# Patient Record
Sex: FEMALE | Race: WHITE | NOT HISPANIC OR LATINO | Employment: OTHER | URBAN - METROPOLITAN AREA
[De-identification: names, ages, dates, MRNs, and addresses within clinical notes are randomized per-mention and may not be internally consistent; named-entity substitution may affect disease eponyms.]

---

## 2017-08-07 ENCOUNTER — APPOINTMENT (OUTPATIENT)
Dept: WOMENS IMAGING | Facility: HOSPITAL | Age: 67
End: 2017-08-07

## 2017-08-07 PROCEDURE — 77063 BREAST TOMOSYNTHESIS BI: CPT | Performed by: RADIOLOGY

## 2017-08-07 PROCEDURE — G0202 SCR MAMMO BI INCL CAD: HCPCS | Performed by: RADIOLOGY

## 2017-11-27 ENCOUNTER — PREP FOR SURGERY (OUTPATIENT)
Dept: OTHER | Facility: HOSPITAL | Age: 67
End: 2017-11-27

## 2017-11-27 DIAGNOSIS — Z12.11 COLON CANCER SCREENING: Primary | ICD-10-CM

## 2017-12-27 PROBLEM — Z12.11 COLON CANCER SCREENING: Status: ACTIVE | Noted: 2017-12-27

## 2018-02-05 ENCOUNTER — ANESTHESIA (OUTPATIENT)
Dept: GASTROENTEROLOGY | Facility: HOSPITAL | Age: 68
End: 2018-02-05

## 2018-02-05 ENCOUNTER — ANESTHESIA EVENT (OUTPATIENT)
Dept: GASTROENTEROLOGY | Facility: HOSPITAL | Age: 68
End: 2018-02-05

## 2018-02-05 ENCOUNTER — HOSPITAL ENCOUNTER (OUTPATIENT)
Facility: HOSPITAL | Age: 68
Setting detail: HOSPITAL OUTPATIENT SURGERY
Discharge: HOME | End: 2018-02-05
Attending: SURGERY | Admitting: SURGERY

## 2018-02-05 VITALS
TEMPERATURE: 98.3 F | OXYGEN SATURATION: 98 % | WEIGHT: 140.19 LBS | HEART RATE: 72 BPM | BODY MASS INDEX: 23.36 KG/M2 | RESPIRATION RATE: 16 BRPM | DIASTOLIC BLOOD PRESSURE: 71 MMHG | HEIGHT: 65 IN | SYSTOLIC BLOOD PRESSURE: 110 MMHG

## 2018-02-05 PROBLEM — K57.30 DIVERTICULOSIS OF LARGE INTESTINE: Status: ACTIVE | Noted: 2018-02-05

## 2018-02-05 PROCEDURE — G0121 COLON CA SCRN NOT HI RSK IND: HCPCS | Performed by: SURGERY

## 2018-02-05 PROCEDURE — 25010000002 PROPOFOL 10 MG/ML EMULSION: Performed by: ANESTHESIOLOGY

## 2018-02-05 RX ORDER — SODIUM CHLORIDE, SODIUM LACTATE, POTASSIUM CHLORIDE, CALCIUM CHLORIDE 600; 310; 30; 20 MG/100ML; MG/100ML; MG/100ML; MG/100ML
30 INJECTION, SOLUTION INTRAVENOUS CONTINUOUS PRN
Status: DISCONTINUED | OUTPATIENT
Start: 2018-02-05 | End: 2018-02-05 | Stop reason: HOSPADM

## 2018-02-05 RX ORDER — SPIRONOLACTONE 50 MG/1
50 TABLET, FILM COATED ORAL DAILY
COMMUNITY
End: 2021-05-17

## 2018-02-05 RX ORDER — PHENOL 1.4 %
600 AEROSOL, SPRAY (ML) MUCOUS MEMBRANE DAILY
COMMUNITY
End: 2021-12-02

## 2018-02-05 RX ORDER — HYDRALAZINE HYDROCHLORIDE 50 MG/1
50 TABLET, FILM COATED ORAL 2 TIMES DAILY
COMMUNITY
End: 2019-11-13 | Stop reason: SDUPTHER

## 2018-02-05 RX ORDER — PROPOFOL 10 MG/ML
VIAL (ML) INTRAVENOUS AS NEEDED
Status: DISCONTINUED | OUTPATIENT
Start: 2018-02-05 | End: 2018-02-05 | Stop reason: SURG

## 2018-02-05 RX ORDER — LIDOCAINE HYDROCHLORIDE 20 MG/ML
INJECTION, SOLUTION INFILTRATION; PERINEURAL AS NEEDED
Status: DISCONTINUED | OUTPATIENT
Start: 2018-02-05 | End: 2018-02-05 | Stop reason: SURG

## 2018-02-05 RX ORDER — SODIUM CHLORIDE 0.9 % (FLUSH) 0.9 %
1-10 SYRINGE (ML) INJECTION AS NEEDED
Status: DISCONTINUED | OUTPATIENT
Start: 2018-02-05 | End: 2018-02-05 | Stop reason: HOSPADM

## 2018-02-05 RX ORDER — ASPIRIN 81 MG/1
81 TABLET ORAL EVERY OTHER DAY
COMMUNITY
End: 2019-04-23

## 2018-02-05 RX ADMIN — LIDOCAINE HYDROCHLORIDE 100 MG: 20 INJECTION, SOLUTION INFILTRATION; PERINEURAL at 09:25

## 2018-02-05 RX ADMIN — PROPOFOL 200 MG: 10 INJECTION, EMULSION INTRAVENOUS at 09:25

## 2018-02-05 RX ADMIN — SODIUM CHLORIDE, POTASSIUM CHLORIDE, SODIUM LACTATE AND CALCIUM CHLORIDE 30 ML/HR: 600; 310; 30; 20 INJECTION, SOLUTION INTRAVENOUS at 08:43

## 2018-02-05 NOTE — ANESTHESIA POSTPROCEDURE EVALUATION
"Patient: Karolina Hoyt    Procedure Summary     Date Anesthesia Start Anesthesia Stop Room / Location    02/05/18 0916 0940  [facility]       Procedure Diagnosis Surgeon Provider    COLONOSCOPY TO CECUM (N/A ) Colon cancer screening  (Colon cancer screening [Z12.11]) MD Cookie Ceron MD          Anesthesia Type: MAC  Last vitals  BP   113/75 (02/05/18 0951)   Temp   36.8 °C (98.3 °F) (02/05/18 0811)   Pulse   72 (02/05/18 0951)   Resp   16 (02/05/18 0951)     SpO2   99 % (02/05/18 0951)     Post Anesthesia Care and Evaluation    Patient location during evaluation: bedside  Patient participation: complete - patient participated  Level of consciousness: awake and alert  Pain management: adequate  Airway patency: patent  Anesthetic complications: No anesthetic complications  PONV Status: none  Cardiovascular status: acceptable  Respiratory status: acceptable  Hydration status: acceptable    Comments: /75 (BP Location: Left arm, Patient Position: Lying)  Pulse 72  Temp 36.8 °C (98.3 °F) (Oral)   Resp 16  Ht 165.1 cm (65\")  Wt 63.6 kg (140 lb 3 oz)  SpO2 99%  BMI 23.33 kg/m2        "

## 2018-02-05 NOTE — OP NOTE
Colonoscopy Procedure Note  Karolina Hoyt  1950  Date of Procedure: 02/05/18    Pre-operative Diagnosis:    · screening    Post-operative Diagnosis:  · Diverticulosis, mild    Procedure: Colonoscopy      Findings/Treatments:   · Diverticulosis, mild       Recommendations:   · C scope in 10 years vs cologuard  · Diverticulosis pamphlet  · Copy of photographs to be given from today's procedure prior to discharge.    Surgeon: Augustine    Anesthetic: MAC per Cookie Howell MD    Indications:  As above    Scope Withdrawal Time:  6 minutes  16 seconds    Procedure Details     MAC anesthesia was induced.  The 180 Colonoscopy was inserted blindly into the rectum and advanced to the cecum, with relative ease,  without need for pressure, lift, or turning.  Cecum was identified by ileocecal valve and appendiceal orifice and photographed for documentation.      Prep quality was good.  A careful inspection was made as the colonoscope was withdrawn, including a retroflexed view of the rectum; there was no suggestion of presence of angiodysplasias, colitis, or polyps, but she did have several sigmoid  Diverticula.    Retroflexion in the rectum revealed no abnormalities.    Emelyn Bradshaw MD  02/05/18  9:39 AM

## 2018-02-05 NOTE — PLAN OF CARE
Problem: Patient Care Overview (Adult)  Goal: Plan of Care Review  Outcome: Ongoing (interventions implemented as appropriate)   02/05/18 0805   Coping/Psychosocial Response Interventions   Plan Of Care Reviewed With patient     Goal: Adult Individualization and Mutuality  Outcome: Ongoing (interventions implemented as appropriate)    Goal: Discharge Needs Assessment  Outcome: Ongoing (interventions implemented as appropriate)   02/05/18 0805   Discharge Needs Assessment   Concerns To Be Addressed no discharge needs identified   Discharge Disposition home or self-care   Living Environment   Transportation Available car;family or friend will provide       Problem: GI Endoscopy (Adult)  Goal: Signs and Symptoms of Listed Potential Problems Will be Absent or Manageable (GI Endoscopy)  Outcome: Ongoing (interventions implemented as appropriate)   02/05/18 0805   GI Endoscopy   Problems Assessed (GI Endoscopy) pain;bleeding   Problems Present (GI Endoscopy) none

## 2018-02-05 NOTE — H&P
Cc: Endoscopy Visit    HPI: 67 y.o. female here for screening with average risk.  She had a single hyperplastic polyp in 2004.    Past Medical History:   Diagnosis Date   • Arthritis    • Bronchitis    • Hypertension        Past Surgical History:   Procedure Laterality Date   • HYSTERECTOMY     • THYROID LOBECTOMY      approx 1999 or 2000       is allergic to erythromycin.       Medication List      ASK your doctor about these medications          aspirin 81 MG EC tablet       calcium carbonate 600 MG tablet   Commonly known as:  OS-ZAIDA       hydrALAZINE 50 MG tablet   Commonly known as:  APRESOLINE       spironolactone 50 MG tablet   Commonly known as:  ALDACTONE           History reviewed. No pertinent family history.    Social History     Social History   • Marital status:      Spouse name: N/A   • Number of children: N/A   • Years of education: N/A     Occupational History   • Not on file.     Social History Main Topics   • Smoking status: Former Smoker   • Smokeless tobacco: Never Used      Comment: quit approx 2009   • Alcohol use Yes      Comment: rarely   • Drug use: Not on file   • Sexual activity: Not on file     Other Topics Concern   • Not on file     Social History Narrative   • No narrative on file       Vitals:    02/05/18 0811   BP: 131/84   Pulse: 86   Resp: 20   Temp: 98.3 °F (36.8 °C)   SpO2: 98%       Body mass index is 23.33 kg/(m^2).    Physical Exam    General: No acute distress  Lungs: No labored breathing, Pulse oximetry on room air is 98%.  Heart: RRR  Abdo: Soft  Mental:  Awake, alert, and oriented    Imp:     · screening     Plan:  · c salima Bradshaw MD  9:21 AM

## 2018-02-05 NOTE — ANESTHESIA PREPROCEDURE EVALUATION
Anesthesia Evaluation     Patient summary reviewed and Nursing notes reviewed   NPO Solid Status: > 8 hours  NPO Liquid Status: > 2 hours     Airway   Mallampati: I  TM distance: >3 FB  Neck ROM: full  no difficulty expected  Dental - normal exam     Pulmonary - normal exam   (+) a smoker Former,   Cardiovascular - normal exam    (+) hypertension (one med),       Neuro/Psych  GI/Hepatic/Renal/Endo      Musculoskeletal     Abdominal  - normal exam    Bowel sounds: normal.   Substance History      OB/GYN          Other                                              Anesthesia Plan    ASA 2     MAC     Anesthetic plan and risks discussed with patient.

## 2018-08-13 ENCOUNTER — APPOINTMENT (OUTPATIENT)
Dept: WOMENS IMAGING | Facility: HOSPITAL | Age: 68
End: 2018-08-13

## 2018-08-13 PROCEDURE — 77063 BREAST TOMOSYNTHESIS BI: CPT | Performed by: RADIOLOGY

## 2018-08-13 PROCEDURE — 77067 SCR MAMMO BI INCL CAD: CPT | Performed by: RADIOLOGY

## 2019-03-20 DIAGNOSIS — E03.9 ACQUIRED HYPOTHYROIDISM: Primary | ICD-10-CM

## 2019-04-16 DIAGNOSIS — I10 HYPERTENSION, UNSPECIFIED TYPE: Primary | ICD-10-CM

## 2019-04-16 DIAGNOSIS — E03.9 ACQUIRED HYPOTHYROIDISM: ICD-10-CM

## 2019-04-17 LAB
ALBUMIN SERPL-MCNC: 4.8 G/DL (ref 3.5–5.2)
ALBUMIN/GLOB SERPL: 2.1 G/DL
ALP SERPL-CCNC: 66 U/L (ref 39–117)
ALT SERPL-CCNC: 13 U/L (ref 1–33)
AST SERPL-CCNC: 20 U/L (ref 1–32)
BILIRUB SERPL-MCNC: 1.8 MG/DL (ref 0.2–1.2)
BUN SERPL-MCNC: 20 MG/DL (ref 8–23)
BUN/CREAT SERPL: 23.5 (ref 7–25)
CALCIUM SERPL-MCNC: 10.5 MG/DL (ref 8.6–10.5)
CHLORIDE SERPL-SCNC: 99 MMOL/L (ref 98–107)
CHOLEST SERPL-MCNC: 216 MG/DL (ref 0–200)
CO2 SERPL-SCNC: 28.5 MMOL/L (ref 22–29)
CREAT SERPL-MCNC: 0.85 MG/DL (ref 0.57–1)
GLOBULIN SER CALC-MCNC: 2.3 GM/DL
GLUCOSE SERPL-MCNC: 87 MG/DL (ref 65–99)
HDLC SERPL-MCNC: 100 MG/DL (ref 40–60)
LDLC SERPL CALC-MCNC: 106 MG/DL (ref 0–100)
POTASSIUM SERPL-SCNC: 3.8 MMOL/L (ref 3.5–5.2)
PROT SERPL-MCNC: 7.1 G/DL (ref 6–8.5)
SODIUM SERPL-SCNC: 140 MMOL/L (ref 136–145)
TRIGL SERPL-MCNC: 48 MG/DL (ref 0–150)
TSH SERPL DL<=0.005 MIU/L-ACNC: 3.17 MIU/ML (ref 0.27–4.2)
VLDLC SERPL CALC-MCNC: 9.6 MG/DL

## 2019-04-23 ENCOUNTER — OFFICE VISIT (OUTPATIENT)
Dept: INTERNAL MEDICINE | Facility: CLINIC | Age: 69
End: 2019-04-23

## 2019-04-23 VITALS
BODY MASS INDEX: 21.99 KG/M2 | SYSTOLIC BLOOD PRESSURE: 130 MMHG | DIASTOLIC BLOOD PRESSURE: 64 MMHG | HEART RATE: 74 BPM | WEIGHT: 132 LBS | HEIGHT: 65 IN

## 2019-04-23 DIAGNOSIS — I10 ESSENTIAL HYPERTENSION: Primary | ICD-10-CM

## 2019-04-23 DIAGNOSIS — Z00.00 MEDICARE ANNUAL WELLNESS VISIT, SUBSEQUENT: ICD-10-CM

## 2019-04-23 DIAGNOSIS — F41.9 ANXIETY: ICD-10-CM

## 2019-04-23 PROBLEM — Z12.11 COLON CANCER SCREENING: Status: RESOLVED | Noted: 2017-12-27 | Resolved: 2019-04-23

## 2019-04-23 PROCEDURE — G0439 PPPS, SUBSEQ VISIT: HCPCS | Performed by: INTERNAL MEDICINE

## 2019-04-23 RX ORDER — CALCIUM POLYCARBOPHIL 625 MG 625 MG/1
625 TABLET ORAL DAILY
Qty: 90 TABLET | Refills: 0 | COMMUNITY
Start: 2019-04-23 | End: 2021-05-17

## 2019-04-23 RX ORDER — BUSPIRONE HYDROCHLORIDE 5 MG/1
5 TABLET ORAL DAILY
Qty: 90 TABLET | Refills: 3 | Status: SHIPPED | OUTPATIENT
Start: 2019-04-23 | End: 2019-06-19 | Stop reason: SDUPTHER

## 2019-04-23 NOTE — PROGRESS NOTES
QUICK REFERENCE INFORMATION:  The ABCs of the Annual Wellness Visit    Subsequent Medicare Wellness Visit    HEALTH RISK ASSESSMENT    1950    Recent Hospitalizations:  No hospitalization(s) within the last year..    Current Medical Providers:  Patient Care Team:  Josie Youssef MD as PCP - General (Internal Medicine)  Emelyn Bradshaw MD as Surgeon (General Surgery)    Smoking Status:  Social History     Tobacco Use   Smoking Status Former Smoker   Smokeless Tobacco Never Used   Tobacco Comment    quit approx 2009       Alcohol Consumption:  Social History     Substance and Sexual Activity   Alcohol Use Yes    Comment: rarely       Depression Screen:   PHQ-2/PHQ-9 Depression Screening 4/23/2019   Little interest or pleasure in doing things 0   Feeling down, depressed, or hopeless 0   Trouble falling or staying asleep, or sleeping too much 0   Feeling tired or having little energy 0   Poor appetite or overeating 0   Feeling bad about yourself - or that you are a failure or have let yourself or your family down 0   Trouble concentrating on things, such as reading the newspaper or watching television 0   Moving or speaking so slowly that other people could have noticed. Or the opposite - being so fidgety or restless that you have been moving around a lot more than usual 0   Thoughts that you would be better off dead, or of hurting yourself in some way 0   Total Score 0       Health Habits and Functional and Cognitive Screening:  Functional & Cognitive Status 4/23/2019   Do you have difficulty preparing food and eating? No   Do you have difficulty bathing yourself, getting dressed or grooming yourself? No   Do you have difficulty using the toilet? No   Do you have difficulty moving around from place to place? No   Do you have trouble with steps or getting out of a bed or a chair? No   In the past year have you fallen or experienced a near fall? No   Current Diet Well Balanced Diet   Dental Exam Up to date   Eye  Exam Up to date   Exercise (times per week) 4 times per week   Current Exercise Activities Include Walking   Do you need help using the phone?  No   Are you deaf or do you have serious difficulty hearing?  No   Do you need help with transportation? No   Do you need help shopping? No   Do you need help preparing meals?  No   Do you need help with housework?  No   Do you need help with laundry? No   Do you need help taking your medications? No   Do you need help managing money? No   Do you ever drive or ride in a car without wearing a seat belt? No   Have you felt unusual stress, anger or loneliness in the last month? Yes   Who do you live with? Spouse   If you need help, do you have trouble finding someone available to you? No   Have you been bothered in the last four weeks by sexual problems? No   Do you have difficulty concentrating, remembering or making decisions? No     Activities of Daily Living  Do you have difficulty preparing food and eating?: No  Do you have difficulty bathing yourself, getting dressed or grooming yourself?: No  Do you have difficulty using the toilet?: No  Do you have difficulty moving around from place to place?: No  Do you have trouble with steps or getting out of a bed or a chair?: No  In the past year have you fallen or experienced a near fall?: No  Instrumental Activities of Daily Living  Do you need help using the phone? : No  Are you deaf or do you have serious difficulty hearing? : No  Do you need help with transportation?: No  Do you need help shopping?: No  Do you need help preparing meals? : No  Do you need help with housework? : No  Do you need help with laundry?: No  Do you need help taking your medications?: No  Do you need help managing money?: No  Do you ever drive or ride in a car without wearing a seat belt?: No  Psychosocial Risks  Have you felt unusual stress, anger or loneliness in the last month?: (!) Yes  Who do you live with?: Spouse  If you need help, do you have  trouble finding someone available to you?: No  Have you been bothered in the last four weeks by sexual problems?: No  Cognitive Status  Do you have difficulty concentrating, remembering or making decisions?: No  Health Habits  Current Diet: Well Balanced Diet  Dental Exam: Up to date  Eye Exam: Up to date  Exercise (times per week): 4 times per week  Current Exercise Activities Include: Walking    Does the patient have evidence of cognitive impairment? No     Aspirin use counseling: Does not need ASA but is currently taking (advised patient that ASA is not indicated and patient chooses to stop it)    Recent Lab Results:  CMP:  Lab Results   Component Value Date    GLU 87 04/16/2019    BUN 20 04/16/2019    CREATININE 0.85 04/16/2019    EGFRIFNONA 66 04/16/2019    EGFRIFAFRI 80 04/16/2019    BCR 23.5 04/16/2019     04/16/2019    K 3.8 04/16/2019    CO2 28.5 04/16/2019    CALCIUM 10.5 04/16/2019    PROTENTOTREF 7.1 04/16/2019    ALBUMIN 4.80 04/16/2019    LABGLOBREF 2.3 04/16/2019    LABIL2 2.1 04/16/2019    BILITOT 1.8 (H) 04/16/2019    ALKPHOS 66 04/16/2019    AST 20 04/16/2019    ALT 13 04/16/2019     Lipid Panel:  Lab Results   Component Value Date    TRIG 48 04/16/2019     (H) 04/16/2019    VLDL 9.6 04/16/2019     HbA1c:       Visual Acuity:  No exam data present    Age-appropriate Screening Schedule:  Refer to the list below for future screening recommendations based on patient's age, sex and/or medical conditions. Orders for these recommended tests are listed in the plan section. The patient has been provided with a written plan.    Health Maintenance   Topic Date Due   • TDAP/TD VACCINES (1 - Tdap) 03/29/1969   • ZOSTER VACCINE (1 of 2) 03/29/2000   • MAMMOGRAM  11/27/2017   • INFLUENZA VACCINE  08/01/2019   • COLONOSCOPY  02/05/2028   • PNEUMOCOCCAL VACCINES (65+ LOW/MEDIUM RISK)  Completed        Subjective   History of Present Illness    Karolina Hoyt is a 69 y.o. female who presents for a  Subsequent Wellness Visit.  HPI    The following portions of the patient's history were reviewed and updated as appropriate: allergies, current medications, past family history, past medical history, past social history, past surgical history and problem list.    Outpatient Medications Prior to Visit   Medication Sig Dispense Refill   • busPIRone (BUSPAR) 5 MG tablet      • calcium carbonate (OS-ZAIDA) 600 MG tablet Take 600 mg by mouth Daily.     • hydrALAZINE (APRESOLINE) 50 MG tablet Take 50 mg by mouth 2 (Two) Times a Day.     • losartan (COZAAR) 100 MG tablet      • spironolactone (ALDACTONE) 50 MG tablet Take 50 mg by mouth Daily.     • aspirin 81 MG EC tablet Take 81 mg by mouth Every Other Day.     • Calcium Carbonate-Vit D-Min (CALCIUM 1200) 1412-8342 MG-UNIT chewable tablet Chew.       No facility-administered medications prior to visit.        Patient Active Problem List   Diagnosis   • Diverticulosis of large intestine   • Essential hypertension   • Anxiety       Advance Care Planning:  Patient has an advance directive - a copy has not been provided. Have asked the patient to send this to us to add to record    Identification of Risk Factors:  Risk factors include: none    Review of Systems   Constitutional: Negative.    HENT: Negative.    Respiratory: Negative.    Gastrointestinal: Negative.    Endocrine: Negative.    Genitourinary: Negative.    Neurological: Negative.    Psychiatric/Behavioral: Negative.        Compared to one year ago, the patient feels her physical health is better and her mental health is better.    Objective     Physical Exam   Cardiovascular: Normal rate, regular rhythm and normal heart sounds.   Pulmonary/Chest: Effort normal and breath sounds normal.   Musculoskeletal: She exhibits no edema.   Lymphadenopathy:     She has no cervical adenopathy.   Psychiatric: She has a normal mood and affect. Her behavior is normal. Judgment and thought content normal.       Vitals:    04/23/19  "1406 04/23/19 1444   BP:  130/64   Pulse:  74   Weight: 59.9 kg (132 lb)    Height: 165.1 cm (65\")    PainSc: 0-No pain 0-No pain       Patient's Body mass index is 21.97 kg/m². BMI is within normal parameters. No follow-up required..        Assessment/Plan   Patient Self-Management and Personalized Health Advice  The patient has been provided with information about: Immunizations given today; none  none and preventive services including:     · Exercise counseling provided.    Visit Diagnoses:  Problem List Items Addressed This Visit        Cardiovascular and Mediastinum    Essential hypertension - Primary       Other    Anxiety      Other Visit Diagnoses     Medicare annual wellness visit, subsequent        up to date          No orders of the defined types were placed in this encounter.      Outpatient Encounter Medications as of 4/23/2019   Medication Sig Dispense Refill   • busPIRone (BUSPAR) 5 MG tablet      • calcium carbonate (OS-ZAIDA) 600 MG tablet Take 600 mg by mouth Daily.     • hydrALAZINE (APRESOLINE) 50 MG tablet Take 50 mg by mouth 2 (Two) Times a Day.     • losartan (COZAAR) 100 MG tablet      • spironolactone (ALDACTONE) 50 MG tablet Take 50 mg by mouth Daily.     • [DISCONTINUED] aspirin 81 MG EC tablet Take 81 mg by mouth Every Other Day.     • calcium polycarbophil (FIBERCON) 625 MG tablet Take 1 tablet by mouth Daily. 90 tablet 0   • [DISCONTINUED] Calcium Carbonate-Vit D-Min (CALCIUM 1200) 7116-6208 MG-UNIT chewable tablet Chew.       No facility-administered encounter medications on file as of 4/23/2019.        Reviewed use of high risk medication in the elderly: yes  Reviewed for potential of harmful drug interactions in the elderly: no    Follow Up:  Return in about 6 months (around 10/23/2019).     An After Visit Summary and PPPS with all of these plans were given to the patient.         "

## 2019-04-25 ENCOUNTER — TELEPHONE (OUTPATIENT)
Dept: INTERNAL MEDICINE | Facility: CLINIC | Age: 69
End: 2019-04-25

## 2019-06-19 ENCOUNTER — OFFICE VISIT (OUTPATIENT)
Dept: INTERNAL MEDICINE | Facility: CLINIC | Age: 69
End: 2019-06-19

## 2019-06-19 VITALS
WEIGHT: 128.2 LBS | BODY MASS INDEX: 20.6 KG/M2 | HEIGHT: 66 IN | DIASTOLIC BLOOD PRESSURE: 78 MMHG | SYSTOLIC BLOOD PRESSURE: 132 MMHG

## 2019-06-19 DIAGNOSIS — F41.9 ANXIETY: Primary | ICD-10-CM

## 2019-06-19 DIAGNOSIS — I10 ESSENTIAL HYPERTENSION: ICD-10-CM

## 2019-06-19 PROCEDURE — 99213 OFFICE O/P EST LOW 20 MIN: CPT | Performed by: PHYSICIAN ASSISTANT

## 2019-06-19 RX ORDER — BUSPIRONE HYDROCHLORIDE 5 MG/1
10 TABLET ORAL 2 TIMES DAILY
Qty: 180 TABLET | Refills: 1 | Status: SHIPPED | OUTPATIENT
Start: 2019-06-19 | End: 2019-07-10 | Stop reason: SDUPTHER

## 2019-06-19 NOTE — PROGRESS NOTES
Subjective   Chief Complaint   Patient presents with   • Hypertension   • Depression       Pt is a 69 year old white female who presents today for elevated BP and worsening anxiety. She states her BP was running normal until about a week ago has been on the higher side in the 140's/90's. She has had an occasional headache as well which is not usual for her. She took care of her brother who stayed with her for 6 weeks and went back to his daughter's 3 -4 mos ago. Her anxiety has been worse since this, she is frustrated because she was feeling so good. She started taking a buspar 5 mg in the evening in addition to her morning dose, but has not noticed she is feeling much better. She states her appetite is down as well. She is hungry but just does not feel like eating much, she has had some depression as well.           Patient Active Problem List   Diagnosis   • Diverticulosis of large intestine   • Essential hypertension   • Anxiety       Allergies   Allergen Reactions   • Erythromycin Nausea Only       Current Outpatient Medications on File Prior to Visit   Medication Sig Dispense Refill   • calcium carbonate (OS-ZAIAD) 600 MG tablet Take 600 mg by mouth Daily.     • calcium polycarbophil (FIBERCON) 625 MG tablet Take 1 tablet by mouth Daily. 90 tablet 0   • hydrALAZINE (APRESOLINE) 50 MG tablet Take 50 mg by mouth 2 (Two) Times a Day.     • losartan (COZAAR) 100 MG tablet      • spironolactone (ALDACTONE) 50 MG tablet Take 50 mg by mouth Daily.       No current facility-administered medications on file prior to visit.        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    • Tachycardia    • Urine frequency        Family History   Problem Relation Age of Onset   • Dementia Mother    • Breast cancer Mother    • Tuberculosis Father    • Breast cancer Sister    • Prostate cancer Brother         Social History     Socioeconomic History   • Marital status:      Spouse name: Not on file   • Number of children: Not on file   • Years of education: Not on file   • Highest education level: Not on file   Tobacco Use   • Smoking status: Former Smoker   • Smokeless tobacco: Never Used   • Tobacco comment: quit approx 2009   Substance and Sexual Activity   • Alcohol use: Yes     Comment: rarely   • Drug use: No       Past Surgical History:   Procedure Laterality Date   • COLONOSCOPY N/A 2/5/2018    Procedure: COLONOSCOPY TO CECUM;  Surgeon: Emelyn Bradshaw MD;  Location: [facility] END[facility]COPY;  Service:    • THYROID LOBECTOMY      approx 1999 or 2000   • THYROIDECTOMY, PARTIAL         PHQ-9 Depression Screening  Little interest or pleasure in doing things?     Feeling down, depressed, or hopeless?     Trouble falling or staying asleep, or sleeping too much?     Feeling tired or having little energy?     Poor appetite or overeating?     Feeling bad about yourself - or that you are a failure or have let yourself or your family down?     Trouble concentrating on things, such as reading the newspaper or watching television?     Moving or speaking so slowly that other people could have noticed? Or the opposite - being so fidgety or restless that you have been moving around a lot more than usual?     Thoughts that you would be better off dead, or of hurting yourself in some way?     PHQ-9 Total Score     If you checked off any problems, how difficult have these problems made it for you to do your work, take care of things at home, or get along with other people?       The following portions of the patient's history were reviewed and updated as appropriate: problem list, allergies, current medications, past medical history, past family history, past social history and past surgical history.    Review of Systems   Constitution: Positive for decreased appetite.   Gastrointestinal: Negative for bloating and abdominal  "pain.   Psychiatric/Behavioral: Positive for depression. The patient is nervous/anxious.        Immunization History   Administered Date(s) Administered   • DTaP 05/06/2011   • Flu Vaccine Intradermal Quad 18-64YR 11/07/2018   • Pneumococcal Conjugate 13-Valent (PCV13) 09/21/2016   • Pneumococcal Polysaccharide (PPSV23) 11/07/2017       Objective   Vitals:    06/19/19 1442 06/19/19 1517   BP:  132/78   Weight: 58.2 kg (128 lb 3.2 oz)    Height: 166.4 cm (65.5\")      Physical Exam   Constitutional: She appears well-developed and well-nourished.   Cardiovascular: Normal rate, regular rhythm and normal heart sounds.   Pulmonary/Chest: Effort normal and breath sounds normal.   Abdominal: Soft. Bowel sounds are normal.   Psychiatric: She has a normal mood and affect. Her behavior is normal. Thought content normal.   Vitals reviewed.        Assessment/Plan   Karolina was seen today for hypertension and depression.    Diagnoses and all orders for this visit:    Anxiety    Essential hypertension    Other orders  -     busPIRone (BUSPAR) 5 MG tablet; Take 2 tablets by mouth 2 (Two) Times a Day.    I am going to increase her buspar to 10 mg AM and PM. Her BP is ok today, I will have her continue to monitor. I would like her to fup with Dr. Youssef in 3 weeks.              "

## 2019-07-10 ENCOUNTER — OFFICE VISIT (OUTPATIENT)
Dept: INTERNAL MEDICINE | Facility: CLINIC | Age: 69
End: 2019-07-10

## 2019-07-10 VITALS
BODY MASS INDEX: 20.89 KG/M2 | HEIGHT: 66 IN | WEIGHT: 130 LBS | HEART RATE: 74 BPM | SYSTOLIC BLOOD PRESSURE: 118 MMHG | DIASTOLIC BLOOD PRESSURE: 66 MMHG

## 2019-07-10 DIAGNOSIS — I10 ESSENTIAL HYPERTENSION: ICD-10-CM

## 2019-07-10 DIAGNOSIS — F41.9 ANXIETY: Primary | ICD-10-CM

## 2019-07-10 PROCEDURE — 99212 OFFICE O/P EST SF 10 MIN: CPT | Performed by: INTERNAL MEDICINE

## 2019-07-10 RX ORDER — BUSPIRONE HYDROCHLORIDE 10 MG/1
10 TABLET ORAL 2 TIMES DAILY
Qty: 180 TABLET | Refills: 1 | Status: SHIPPED | OUTPATIENT
Start: 2019-07-10 | End: 2020-02-13

## 2019-07-10 NOTE — PROGRESS NOTES
Assessment and Plan  Karolina was seen today for anxiety and hypertension.    Diagnoses and all orders for this visit:    Anxiety  Comments:  stay on busprione - call with problems.    Essential hypertension  Comments:  BP much improved, no changes made.      F/U and Patient Instructions    No Follow-up on file.  There are no Patient Instructions on file for this visit.    Subjective    Karolina Hoyt is a 69 y.o. female being seen in our office today for Anxiety and Hypertension     History of the Present Illness  HPI  Here to f/u addition of extra dose of buspirone.  She feels much better on the higher dose.  Thinks problems got worse when she kept her brother with dementia for a few weeks in April.    BP  Patient History        Significant Past History  The following portions of the patient's history were reviewed and updated as appropriate:PMHroutine: Social history , Allergies, Current Medications, Active Problem List and Health Maintenance              Social History  She  reports that she has quit smoking. She has never used smokeless tobacco. She reports that she drinks alcohol. She reports that she does not use drugs.                         Review of Symptoms  Review of Systems   Constitution: Negative for decreased appetite and weight loss.   Cardiovascular: Negative.    Respiratory: Negative.    Psychiatric/Behavioral: Negative.      Objective  Vital Signs         BP Readings from Last 1 Encounters:   07/10/19 118/66     Wt Readings from Last 3 Encounters:   07/10/19 59 kg (130 lb)   06/19/19 58.2 kg (128 lb 3.2 oz)   04/23/19 59.9 kg (132 lb)   Body mass index is 21.3 kg/m².          Physical Exam   Physical Exam   Constitutional: No distress.   Cardiovascular: Normal rate and regular rhythm.   Pulmonary/Chest: Effort normal.   Psychiatric: She has a normal mood and affect. Her behavior is normal.     Data Reviewed    No results found for this or any previous visit (from the past 2016 hour(s)).

## 2019-08-19 ENCOUNTER — APPOINTMENT (OUTPATIENT)
Dept: WOMENS IMAGING | Facility: HOSPITAL | Age: 69
End: 2019-08-19

## 2019-08-19 PROCEDURE — 77067 SCR MAMMO BI INCL CAD: CPT | Performed by: RADIOLOGY

## 2019-08-19 PROCEDURE — 77063 BREAST TOMOSYNTHESIS BI: CPT | Performed by: RADIOLOGY

## 2019-09-18 ENCOUNTER — TELEPHONE (OUTPATIENT)
Dept: INTERNAL MEDICINE | Facility: CLINIC | Age: 69
End: 2019-09-18

## 2019-09-18 NOTE — TELEPHONE ENCOUNTER
Dr. LENNON pt called said that she is taking buspirone 10mg bid.  She is still having issue with depression and thinks something needs to be adjusted.  Please advise     PT # [id number]

## 2019-09-25 ENCOUNTER — OFFICE VISIT (OUTPATIENT)
Dept: INTERNAL MEDICINE | Facility: CLINIC | Age: 69
End: 2019-09-25

## 2019-09-25 VITALS
HEART RATE: 74 BPM | SYSTOLIC BLOOD PRESSURE: 112 MMHG | DIASTOLIC BLOOD PRESSURE: 68 MMHG | BODY MASS INDEX: 20.09 KG/M2 | HEIGHT: 66 IN | WEIGHT: 125 LBS

## 2019-09-25 DIAGNOSIS — R63.4 WEIGHT LOSS: ICD-10-CM

## 2019-09-25 DIAGNOSIS — I10 ESSENTIAL HYPERTENSION: ICD-10-CM

## 2019-09-25 DIAGNOSIS — R53.83 FATIGUE, UNSPECIFIED TYPE: Primary | ICD-10-CM

## 2019-09-25 PROCEDURE — 99213 OFFICE O/P EST LOW 20 MIN: CPT | Performed by: INTERNAL MEDICINE

## 2019-09-25 NOTE — PROGRESS NOTES
Assessment and Plan  Karolina was seen today for depression.    Diagnoses and all orders for this visit:    Fatigue, unspecified type  Comments:  r/o organic cause before we try to treat with different or escalating antidepressant therapy.  Orders:  -     CBC & Differential  -     Comprehensive Metabolic Panel  -     Ferritin    Weight loss  -     CBC & Differential  -     Comprehensive Metabolic Panel  -     Ferritin    Essential hypertension  Comments:  controlled.      F/U and Patient Instructions    No Follow-up on file.  There are no Patient Instructions on file for this visit.    Subjective    Karolina Hoyt is a 69 y.o. female being seen in our office today for Depression     History of the Present Illness  HPI Having trouble with depression- she feels fatigued, doesn't want to do much, etc.  Doesn't know of any event that may have started all of this.   Physically she notices she is very low energy, she gets SOB. She has not been able to do her regular walking. Doesn't want to and gets SOB doing. It.  Poor appetite- gets diarrhea, loose stools after she eats, if she pushes it too much. Takes Pepto prn weekly.  Doesn't have an appetite for anything in particular.      Patient History        Significant Past History  The following portions of the patient's history were reviewed and updated as appropriate:PMHroutine: Social history , Allergies, Current Medications, Active Problem List and Health Maintenance              Social History  She  reports that she has quit smoking. She has never used smokeless tobacco. She reports that she drinks alcohol. She reports that she does not use drugs.                         Review of Symptoms  Review of Systems   Constitution: Positive for decreased appetite and weight loss.   HENT: Negative.    Cardiovascular: Negative.    Respiratory:        ALFARO     Musculoskeletal: Negative.    Psychiatric/Behavioral: Positive for depression. Negative for memory loss and suicidal  ideas. The patient is nervous/anxious. The patient does not have insomnia.      Objective  Vital Signs         BP Readings from Last 1 Encounters:   09/25/19 112/68     Wt Readings from Last 3 Encounters:   09/25/19 56.7 kg (125 lb)   07/10/19 59 kg (130 lb)   06/19/19 58.2 kg (128 lb 3.2 oz)   Body mass index is 20.48 kg/m².          Physical Exam   Physical Exam   Constitutional: No distress.   Cardiovascular: Normal rate, regular rhythm and normal heart sounds.   Abdominal: Tenderness: epigastric, no guarding or rebound.   Musculoskeletal: She exhibits no edema.     Data Reviewed    No results found for this or any previous visit (from the past 2016 hour(s)).

## 2019-09-26 DIAGNOSIS — F41.9 ANXIETY: Primary | ICD-10-CM

## 2019-09-26 LAB
ALBUMIN SERPL-MCNC: 4.5 G/DL (ref 3.5–5.2)
ALBUMIN/GLOB SERPL: 2 G/DL
ALP SERPL-CCNC: 64 U/L (ref 39–117)
ALT SERPL-CCNC: 9 U/L (ref 1–33)
AST SERPL-CCNC: 13 U/L (ref 1–32)
BASOPHILS # BLD AUTO: 0.04 10*3/MM3 (ref 0–0.2)
BASOPHILS NFR BLD AUTO: 0.5 % (ref 0–1.5)
BILIRUB SERPL-MCNC: 1.3 MG/DL (ref 0.2–1.2)
BUN SERPL-MCNC: 21 MG/DL (ref 8–23)
BUN/CREAT SERPL: 27.3 (ref 7–25)
CALCIUM SERPL-MCNC: 10.1 MG/DL (ref 8.6–10.5)
CHLORIDE SERPL-SCNC: 103 MMOL/L (ref 98–107)
CO2 SERPL-SCNC: 29.5 MMOL/L (ref 22–29)
CREAT SERPL-MCNC: 0.77 MG/DL (ref 0.57–1)
EOSINOPHIL # BLD AUTO: 0.05 10*3/MM3 (ref 0–0.4)
EOSINOPHIL NFR BLD AUTO: 0.6 % (ref 0.3–6.2)
ERYTHROCYTE [DISTWIDTH] IN BLOOD BY AUTOMATED COUNT: 12.4 % (ref 12.3–15.4)
FERRITIN SERPL-MCNC: 111 NG/ML (ref 13–150)
GLOBULIN SER CALC-MCNC: 2.3 GM/DL
GLUCOSE SERPL-MCNC: 90 MG/DL (ref 65–99)
HCT VFR BLD AUTO: 39.4 % (ref 34–46.6)
HGB BLD-MCNC: 12.6 G/DL (ref 12–15.9)
IMM GRANULOCYTES # BLD AUTO: 0.02 10*3/MM3 (ref 0–0.05)
IMM GRANULOCYTES NFR BLD AUTO: 0.2 % (ref 0–0.5)
LYMPHOCYTES # BLD AUTO: 0.99 10*3/MM3 (ref 0.7–3.1)
LYMPHOCYTES NFR BLD AUTO: 12.1 % (ref 19.6–45.3)
MCH RBC QN AUTO: 28.6 PG (ref 26.6–33)
MCHC RBC AUTO-ENTMCNC: 32 G/DL (ref 31.5–35.7)
MCV RBC AUTO: 89.5 FL (ref 79–97)
MONOCYTES # BLD AUTO: 0.58 10*3/MM3 (ref 0.1–0.9)
MONOCYTES NFR BLD AUTO: 7.1 % (ref 5–12)
NEUTROPHILS # BLD AUTO: 6.53 10*3/MM3 (ref 1.7–7)
NEUTROPHILS NFR BLD AUTO: 79.5 % (ref 42.7–76)
NRBC BLD AUTO-RTO: 0 /100 WBC (ref 0–0.2)
PLATELET # BLD AUTO: 355 10*3/MM3 (ref 140–450)
POTASSIUM SERPL-SCNC: 4.4 MMOL/L (ref 3.5–5.2)
PROT SERPL-MCNC: 6.8 G/DL (ref 6–8.5)
RBC # BLD AUTO: 4.4 10*6/MM3 (ref 3.77–5.28)
SODIUM SERPL-SCNC: 142 MMOL/L (ref 136–145)
WBC # BLD AUTO: 8.21 10*3/MM3 (ref 3.4–10.8)

## 2019-10-14 ENCOUNTER — TELEPHONE (OUTPATIENT)
Dept: INTERNAL MEDICINE | Facility: CLINIC | Age: 69
End: 2019-10-14

## 2019-10-14 NOTE — TELEPHONE ENCOUNTER
Dr. LENNON PT called states she has not heard anything from the psych dr you referred her to.  (Dr. Leydi Guillaume)  Pt states she has tried to call herself and all it does is ring with not answer     Please advise    Pt # [phone]

## 2019-10-14 NOTE — TELEPHONE ENCOUNTER
The patient was calling the wrong number. I have confirmed and gave her the correct number. Dr. Guillaume [phone].

## 2019-10-14 NOTE — TELEPHONE ENCOUNTER
I just picked a female name from the [facility]- can you look into this for me, please?  Not good service in the [facility]!!

## 2019-11-07 RX ORDER — LOSARTAN POTASSIUM 100 MG/1
TABLET ORAL
Qty: 90 TABLET | Refills: 4 | Status: SHIPPED | OUTPATIENT
Start: 2019-11-07 | End: 2020-11-17 | Stop reason: SDUPTHER

## 2019-11-13 RX ORDER — HYDRALAZINE HYDROCHLORIDE 50 MG/1
TABLET, FILM COATED ORAL
Qty: 180 TABLET | Refills: 4 | Status: SHIPPED | OUTPATIENT
Start: 2019-11-13 | End: 2021-06-07 | Stop reason: SDUPTHER

## 2020-02-13 RX ORDER — BUSPIRONE HYDROCHLORIDE 10 MG/1
TABLET ORAL
Qty: 180 TABLET | Refills: 1 | Status: SHIPPED | OUTPATIENT
Start: 2020-02-13 | End: 2020-03-20 | Stop reason: SDUPTHER

## 2020-03-20 DIAGNOSIS — F41.1 GENERALIZED ANXIETY DISORDER: Primary | ICD-10-CM

## 2020-03-20 RX ORDER — BUSPIRONE HYDROCHLORIDE 10 MG/1
10 TABLET ORAL 2 TIMES DAILY
Qty: 180 TABLET | Refills: 1 | Status: SHIPPED | OUTPATIENT
Start: 2020-03-20

## 2020-11-09 RX ORDER — LOSARTAN POTASSIUM 100 MG/1
TABLET ORAL
Qty: 90 TABLET | Refills: 3 | OUTPATIENT
Start: 2020-11-09

## 2020-11-16 ENCOUNTER — OFFICE VISIT (OUTPATIENT)
Dept: INTERNAL MEDICINE | Facility: CLINIC | Age: 70
End: 2020-11-16

## 2020-11-16 VITALS
BODY MASS INDEX: 22.02 KG/M2 | HEIGHT: 66 IN | WEIGHT: 137 LBS | HEART RATE: 78 BPM | TEMPERATURE: 97.1 F | SYSTOLIC BLOOD PRESSURE: 118 MMHG | DIASTOLIC BLOOD PRESSURE: 68 MMHG

## 2020-11-16 DIAGNOSIS — I10 ESSENTIAL HYPERTENSION: Primary | ICD-10-CM

## 2020-11-16 DIAGNOSIS — F41.9 ANXIETY: ICD-10-CM

## 2020-11-16 PROCEDURE — 99213 OFFICE O/P EST LOW 20 MIN: CPT | Performed by: INTERNAL MEDICINE

## 2020-11-16 NOTE — PROGRESS NOTES
Assessment and Plan  Diagnoses and all orders for this visit:    1. Essential hypertension (Primary)  Comments:  has occ orthostatic symptoms- BP usually @120. Will monitor, may be able to decrease hydralazine.   Orders:  -     Comprehensive Metabolic Panel; Future    2. Anxiety  Comments:  seems to be doing well with help of the psychiatrist.  No changes made today.      F/U and Patient Instructions    Return in about 6 months (around 5/16/2021) for Medicare Wellness.  There are no Patient Instructions on file for this visit.    Subjective    Karolina Hoyt is a 70 y.o. female being seen in our office today for Hypertension and Anxiety     History of the Present Illness  HPI here to f/u chronic medical conditions.  She has no complaints today.  She checks BP on occ last reading 112/74- she has no symptoms.   She is now seeing a psychiatrist due to her accelerated anxiety.  They have tried several medications- had Genesight but unsure of results.  Most meds give her diarrhea so she has remained on buspirone BID, feels that this has been as helpful, and tolerated, as anything.    Patient History        Significant Past History  The following portions of the patient's history were reviewed and updated as appropriate:PMHroutine: Social history , Allergies, Current Medications, Active Problem List and Health Maintenance              Social History  She  reports that she has quit smoking. She has never used smokeless tobacco. She reports current alcohol use. She reports that she does not use drugs.                         Review of Symptoms  Review of Systems   Constitution: Negative for weight loss.   Cardiovascular: Negative for chest pain, dyspnea on exertion and irregular heartbeat.   Respiratory: Negative for shortness of breath.    Endocrine: Negative for cold intolerance.   Musculoskeletal: Negative for arthritis.   Gastrointestinal: Negative for change in bowel habit.   Neurological: Negative for focal  weakness.   Psychiatric/Behavioral: The patient is nervous/anxious.      Objective  Vital Signs         BP Readings from Last 1 Encounters:   11/16/20 118/68     Wt Readings from Last 3 Encounters:   11/16/20 62.1 kg (137 lb)   09/25/19 56.7 kg (125 lb)   07/10/19 59 kg (130 lb)   Body mass index is 22.45 kg/m².          Physical Exam   Physical Exam  Constitutional:       Appearance: Normal appearance.   Cardiovascular:      Rate and Rhythm: Normal rate and regular rhythm.   Pulmonary:      Effort: Pulmonary effort is normal.      Breath sounds: Normal breath sounds.   Musculoskeletal:      Right lower leg: No edema.      Left lower leg: No edema.       Data Reviewed    No results found for this or any previous visit (from the past 2016 hour(s)).

## 2020-11-17 RX ORDER — LOSARTAN POTASSIUM 100 MG/1
100 TABLET ORAL DAILY
Qty: 90 TABLET | Refills: 1 | Status: SHIPPED | OUTPATIENT
Start: 2020-11-17 | End: 2021-05-20

## 2020-11-17 NOTE — TELEPHONE ENCOUNTER
Caller: Karolina Hoyt    Relationship: Self    Best call back number: [phone]    Medication needed:   Requested Prescriptions     Pending Prescriptions Disp Refills   • losartan (COZAAR) 100 MG tablet 90 tablet 4     Sig: Take 1 tablet by mouth Daily.       When do you need the refill by: ASAP      Does the patient have less than a 3 day supply:  [] Yes  [x] No    What is the patient's preferred pharmacy: [facility] - [address] [phone] [facility] [phone]

## 2021-03-15 ENCOUNTER — BULK ORDERING (OUTPATIENT)
Dept: CASE MANAGEMENT | Facility: OTHER | Age: 71
End: 2021-03-15

## 2021-03-15 DIAGNOSIS — Z23 IMMUNIZATION DUE: ICD-10-CM

## 2021-04-06 ENCOUNTER — APPOINTMENT (OUTPATIENT)
Dept: WOMENS IMAGING | Facility: HOSPITAL | Age: 71
End: 2021-04-06

## 2021-04-06 PROCEDURE — 77063 BREAST TOMOSYNTHESIS BI: CPT | Performed by: RADIOLOGY

## 2021-04-06 PROCEDURE — 77067 SCR MAMMO BI INCL CAD: CPT | Performed by: RADIOLOGY

## 2021-05-17 ENCOUNTER — OFFICE VISIT (OUTPATIENT)
Dept: INTERNAL MEDICINE | Facility: CLINIC | Age: 71
End: 2021-05-17

## 2021-05-17 VITALS
SYSTOLIC BLOOD PRESSURE: 134 MMHG | HEART RATE: 74 BPM | WEIGHT: 142 LBS | BODY MASS INDEX: 22.82 KG/M2 | DIASTOLIC BLOOD PRESSURE: 76 MMHG | TEMPERATURE: 97.3 F | HEIGHT: 66 IN

## 2021-05-17 DIAGNOSIS — F41.9 ANXIETY: Primary | ICD-10-CM

## 2021-05-17 DIAGNOSIS — Z00.00 MEDICARE ANNUAL WELLNESS VISIT, SUBSEQUENT: ICD-10-CM

## 2021-05-17 DIAGNOSIS — I10 ESSENTIAL HYPERTENSION: ICD-10-CM

## 2021-05-17 PROCEDURE — 99212 OFFICE O/P EST SF 10 MIN: CPT | Performed by: INTERNAL MEDICINE

## 2021-05-17 NOTE — PROGRESS NOTES
The ABCs of the Annual Wellness Visit  Subsequent Medicare Wellness Visit    Chief Complaint   Patient presents with   • Medicare Wellness-subsequent       Subjective   History of Present Illness:  Karolina Hoyt is a 71 y.o. female who presents for a Subsequent Medicare Wellness Visit.  Says her depression and anxiety has been under good control lately.  She attributes a lot to prayer.  She is seeing psychiatrist.  BP has been controlled.    HEALTH RISK ASSESSMENT    Recent Hospitalizations:  No hospitalization(s) within the last year.    Current Medical Providers:  Patient Care Team:  Josie Youssef MD as PCP - General (Internal Medicine)  Emelyn Bradshaw MD as Surgeon (General Surgery)  Leydi Guillaume MD as Consulting Physician (Psychiatry)  Yaa Ingram MD as Consulting Physician (Obstetrics and Gynecology)    Smoking Status:  Social History     Tobacco Use   Smoking Status Former Smoker   Smokeless Tobacco Never Used   Tobacco Comment    quit approx 2009       Alcohol Consumption:  Social History     Substance and Sexual Activity   Alcohol Use Yes    Comment: rarely       Depression Screen:   PHQ-2/PHQ-9 Depression Screening 5/17/2021   Little interest or pleasure in doing things 0   Feeling down, depressed, or hopeless 0   Trouble falling or staying asleep, or sleeping too much 0   Feeling tired or having little energy 0   Poor appetite or overeating 0   Feeling bad about yourself - or that you are a failure or have let yourself or your family down 0   Trouble concentrating on things, such as reading the newspaper or watching television 0   Moving or speaking so slowly that other people could have noticed. Or the opposite - being so fidgety or restless that you have been moving around a lot more than usual 0   Thoughts that you would be better off dead, or of hurting yourself in some way 0   Total Score 0       Fall Risk Screen:  STEADI Fall Risk Assessment was completed, and patient is at LOW risk for  falls.Assessment completed on:5/17/2021    Health Habits and Functional and Cognitive Screening:  Functional & Cognitive Status 5/17/2021   Do you have difficulty preparing food and eating? No   Do you have difficulty bathing yourself, getting dressed or grooming yourself? No   Do you have difficulty using the toilet? No   Do you have difficulty moving around from place to place? No   Do you have trouble with steps or getting out of a bed or a chair? No   Current Diet Well Balanced Diet   Dental Exam Up to date   Eye Exam Up to date   Exercise (times per week) 0 times per week   Current Exercises Include No Regular Exercise   Current Exercise Activities Include -   Do you need help using the phone?  No   Are you deaf or do you have serious difficulty hearing?  No   Do you need help with transportation? No   Do you need help shopping? No   Do you need help preparing meals?  No   Do you need help with housework?  No   Do you need help with laundry? No   Do you need help taking your medications? No   Do you need help managing money? No   Do you ever drive or ride in a car without wearing a seat belt? No   Have you felt unusual stress, anger or loneliness in the last month? No   Who do you live with? Spouse   If you need help, do you have trouble finding someone available to you? No   Have you been bothered in the last four weeks by sexual problems? No   Do you have difficulty concentrating, remembering or making decisions? No         Does the patient have evidence of cognitive impairment? No    Asprin use counseling:Does not need ASA (and currently is not on it)    Age-appropriate Screening Schedule:  Refer to the list below for future screening recommendations based on patient's age, sex and/or medical conditions. Orders for these recommended tests are listed in the plan section. The patient has been provided with a written plan.    Health Maintenance   Topic Date Due   • DXA SCAN  Never done   • TDAP/TD VACCINES (1 -  Tdap) Never done   • ZOSTER VACCINE (1 of 2) Never done   • INFLUENZA VACCINE  08/01/2021   • MAMMOGRAM  10/01/2021          The following portions of the patient's history were reviewed and updated as appropriate: allergies, current medications, past family history, past medical history, past social history, past surgical history and problem list.    Outpatient Medications Prior to Visit   Medication Sig Dispense Refill   • busPIRone (BUSPAR) 10 MG tablet Take 1 tablet by mouth 2 (Two) Times a Day. 180 tablet 1   • calcium carbonate (OS-ZAIDA) 600 MG tablet Take 600 mg by mouth Daily.     • hydrALAZINE (APRESOLINE) 50 MG tablet TAKE 1 TABLET TWICE A  tablet 4   • losartan (COZAAR) 100 MG tablet Take 1 tablet by mouth Daily. 90 tablet 1   • calcium polycarbophil (FIBERCON) 625 MG tablet Take 1 tablet by mouth Daily. 90 tablet 0   • spironolactone (ALDACTONE) 50 MG tablet Take 50 mg by mouth Daily.       No facility-administered medications prior to visit.       Patient Active Problem List   Diagnosis   • Diverticulosis of large intestine   • Essential hypertension   • Anxiety       Advanced Care Planning:  ACP discussion was held with the patient during this visit. Patient has an advance directive (not in EMR), copy requested.    Review of Systems   Constitutional: Negative for activity change and unexpected weight change.   HENT: Negative for congestion and sinus pain.    Respiratory: Negative for chest tightness and shortness of breath.    Cardiovascular: Negative for chest pain.   Gastrointestinal: Negative for abdominal pain.   Endocrine: Negative for cold intolerance.   Genitourinary: Negative for difficulty urinating.   Musculoskeletal: Negative for arthralgias and back pain.   Neurological: Negative for speech difficulty and headaches.   Psychiatric/Behavioral: Nervous/anxious: under good control.        Compared to one year ago, the patient feels her physical health is the same.  Compared to one year  "ago, the patient feels her mental health is the same.    Reviewed chart for potential of high risk medication in the elderly: yes  Reviewed chart for potential of harmful drug interactions in the elderly:yes    Objective         Vitals:    05/17/21 1334   BP: 134/76   Pulse: 74   Temp: 97.3 °F (36.3 °C)   Weight: 64.4 kg (142 lb)   Height: 166.4 cm (65.5\")       Body mass index is 23.27 kg/m².  Discussed the patient's BMI with her. The BMI is in the acceptable range.    Physical Exam  Constitutional:       Appearance: Normal appearance.   Cardiovascular:      Rate and Rhythm: Normal rate and regular rhythm.   Pulmonary:      Effort: Pulmonary effort is normal.      Breath sounds: Normal breath sounds.   Musculoskeletal:      Right lower leg: No edema.      Left lower leg: No edema.   Psychiatric:         Mood and Affect: Mood normal.         Lab Results   Component Value Date    GLU 88 05/11/2021        Assessment/Plan   Medicare Risks and Personalized Health Plan  CMS Preventative Services Quick Reference  Immunizations Discussed/Encouraged (specific immunizations; Tdap and Shingrix )    The above risks/problems have been discussed with the patient.  Pertinent information has been shared with the patient in the After Visit Summary.  Follow up plans and orders are seen below in the Assessment/Plan Section.    Diagnoses and all orders for this visit:    1. Anxiety (Primary)  Comments:  doing much better recently- likes her new psychiatrist.     2. Essential hypertension  Comments:  better control.    3. Medicare annual wellness visit, subsequent  Comments:  to get tetanus and Shingrix at pharmacy- keep check BP, recheck here in 6 months.  Continue healthy lifestyle.       Follow Up:  Return in about 6 months (around 11/17/2021) for Recheck.     An After Visit Summary and PPPS were given to the patient.               "

## 2021-05-20 RX ORDER — LOSARTAN POTASSIUM 100 MG/1
TABLET ORAL
Qty: 90 TABLET | Refills: 1 | Status: SHIPPED | OUTPATIENT
Start: 2021-05-20 | End: 2021-09-02 | Stop reason: SDUPTHER

## 2021-06-07 RX ORDER — HYDRALAZINE HYDROCHLORIDE 50 MG/1
50 TABLET, FILM COATED ORAL 2 TIMES DAILY
Qty: 180 TABLET | Refills: 4 | Status: SHIPPED | OUTPATIENT
Start: 2021-06-07 | End: 2021-06-08 | Stop reason: SDUPTHER

## 2021-06-07 NOTE — TELEPHONE ENCOUNTER
Caller: Karolina Hoyt    Relationship: Self    Best call back number: [phone]    Medication needed:   Requested Prescriptions     Pending Prescriptions Disp Refills   • hydrALAZINE (APRESOLINE) 50 MG tablet 180 tablet 4     Sig: Take 1 tablet by mouth 2 (Two) Times a Day.       When do you need the refill by: 06/09    What additional details did the patient provide when requesting the medication: 12 DAYS LEFT    Does the patient have less than a 3 day supply:  [] Yes  [x] No    What is the patient's preferred pharmacy: [facility] - [address] [phone] [facility] [phone]

## 2021-06-08 RX ORDER — HYDRALAZINE HYDROCHLORIDE 50 MG/1
50 TABLET, FILM COATED ORAL 2 TIMES DAILY
Qty: 180 TABLET | Refills: 1 | Status: SHIPPED | OUTPATIENT
Start: 2021-06-08 | End: 2022-01-01

## 2021-09-02 ENCOUNTER — TELEPHONE (OUTPATIENT)
Dept: INTERNAL MEDICINE | Facility: CLINIC | Age: 71
End: 2021-09-02

## 2021-09-02 DIAGNOSIS — F41.1 GENERALIZED ANXIETY DISORDER: ICD-10-CM

## 2021-09-02 RX ORDER — LOSARTAN POTASSIUM 100 MG/1
100 TABLET ORAL DAILY
Qty: 90 TABLET | Refills: 1 | Status: SHIPPED | OUTPATIENT
Start: 2021-09-02 | End: 2022-01-01

## 2021-09-02 NOTE — TELEPHONE ENCOUNTER
Caller: Karolina Hoyt    Relationship: Self    Best call back number: [phone]    Medication needed:   Requested Prescriptions     Pending Prescriptions Disp Refills   • losartan (COZAAR) 100 MG tablet 90 tablet 1     Sig: Take 1 tablet by mouth Daily.       When do you need the refill by: ASAP     What additional details did the patient provide when requesting the medication: PATIENT HAS 3 DAYS   Does the patient have less than a 3 day supply:  [x] Yes  [] No    What is the patient's preferred pharmacy: [id number] - [place], [place] - [address] [address] [facility] [place] [facility] [address] [place] [address]  - [phone]  - [phone] FX

## 2021-11-17 ENCOUNTER — OFFICE VISIT (OUTPATIENT)
Dept: INTERNAL MEDICINE | Facility: CLINIC | Age: 71
End: 2021-11-17

## 2021-11-17 ENCOUNTER — TELEPHONE (OUTPATIENT)
Dept: INTERNAL MEDICINE | Facility: CLINIC | Age: 71
End: 2021-11-17

## 2021-11-17 VITALS
BODY MASS INDEX: 21.53 KG/M2 | DIASTOLIC BLOOD PRESSURE: 82 MMHG | HEART RATE: 74 BPM | WEIGHT: 134 LBS | SYSTOLIC BLOOD PRESSURE: 132 MMHG | TEMPERATURE: 97.3 F | HEIGHT: 66 IN

## 2021-11-17 DIAGNOSIS — Z23 NEED FOR INFLUENZA VACCINATION: ICD-10-CM

## 2021-11-17 DIAGNOSIS — R19.7 DIARRHEA, UNSPECIFIED TYPE: Primary | ICD-10-CM

## 2021-11-17 DIAGNOSIS — F41.9 ANXIETY: ICD-10-CM

## 2021-11-17 DIAGNOSIS — I10 ESSENTIAL HYPERTENSION: ICD-10-CM

## 2021-11-17 PROCEDURE — 99214 OFFICE O/P EST MOD 30 MIN: CPT | Performed by: INTERNAL MEDICINE

## 2021-11-17 PROCEDURE — G0008 ADMIN INFLUENZA VIRUS VAC: HCPCS | Performed by: INTERNAL MEDICINE

## 2021-11-17 PROCEDURE — 90662 IIV NO PRSV INCREASED AG IM: CPT | Performed by: INTERNAL MEDICINE

## 2021-11-17 NOTE — PROGRESS NOTES
"Chief Complaint  Hypertension    Subjective          Karolina Hoyt presents to [facility]  History of Present Illness Here for BP f/u- her weight is down that she related to diarrhea.  Says she has had bouts of diarrhea in the past but goes away on it's own. Lately she's had loose/watery stools 1-3x day most days, depending on how much Pepto Bismol she takes.  Has never been evaluated for this- the weight loss comes because she feels that the more she eats the more she has diarrhea.  Energy level is low.    Dr. Ingram checks dexa scans- reported as normal.   No recent travel    Objective   Vital Signs:   /82   Pulse 74   Temp 97.3 °F (36.3 °C)   Ht 166.4 cm (65.5\")   Wt 60.8 kg (134 lb)   BMI 21.96 kg/m²     Physical Exam  Constitutional:       Appearance: Normal appearance.   Cardiovascular:      Rate and Rhythm: Normal rate and regular rhythm.   Pulmonary:      Effort: Pulmonary effort is normal.   Abdominal:      Palpations: Abdomen is soft.      Tenderness: There is abdominal tenderness (very mild, diffuse, no guarding).        Result Review :   The following data was reviewed by: Josie Youssef MD on 11/17/2021:  Common labs    Common Labsle 5/11/21   Glucose 88   BUN 17   Creatinine 0.69   eGFR Non  Am 84   eGFR African Am 102   Sodium 139   Potassium 4.2   Chloride 106   Calcium 9.9   Total Protein 6.2   Albumin 4.40   Total Bilirubin 1.2   Alkaline Phosphatase 71   AST (SGOT) 16   ALT (SGPT) 14      Comments are available for some flowsheets but are not being displayed.           Data reviewed: GI studies c-scope 2/2018          Assessment and Plan    Diagnoses and all orders for this visit:    1. Diarrhea, unspecified type (Primary)  Comments:  with weight loss- check labs, stool studies, referral to GI for more assessment.    Orders:  -     Ambulatory Referral to Gastroenterology    2. Essential hypertension  Comments:  controlled, no change.     3. " Anxiety  Comments:  stable        Follow Up   Return in about 6 months (around 5/17/2022) for Medicare Wellness.  Patient was given instructions and counseling regarding her condition or for health maintenance advice. Please see specific information pulled into the AVS if appropriate.

## 2021-11-17 NOTE — TELEPHONE ENCOUNTER
Caller: Karolina Hoyt    Relationship: Self    Best call back number: [phone] ([phone])    What is the best time to reach you: ANYTIME    Who are you requesting to speak with (clinical staff, provider,  specific staff member): RAMÓN    What was the call regarding: PATIENT IS INQUIRING WHAT KIND OF SHOT DID SHE RECEIVE TODAY 11/17/21 AT OFFICE VISIT THE PNEUMONIA SHOT OR FLU SHOT    Do you require a callback: YES

## 2021-11-17 NOTE — ADDENDUM NOTE
Addended by: RAMÓN SPARROW on: 11/17/2021 11:26 AM     Modules accepted: Orders     Adequate: hears normal conversation without difficulty

## 2021-11-18 DIAGNOSIS — E83.52 HYPERCALCEMIA: Primary | ICD-10-CM

## 2021-11-18 LAB
ALBUMIN SERPL-MCNC: 4.6 G/DL (ref 3.7–4.7)
ALBUMIN/GLOB SERPL: 2 {RATIO} (ref 1.2–2.2)
ALP SERPL-CCNC: 77 IU/L (ref 44–121)
ALT SERPL-CCNC: 7 IU/L (ref 0–32)
AST SERPL-CCNC: 14 IU/L (ref 0–40)
BASOPHILS # BLD AUTO: 0.1 X10E3/UL (ref 0–0.2)
BASOPHILS NFR BLD AUTO: 1 %
BILIRUB SERPL-MCNC: 1.7 MG/DL (ref 0–1.2)
BUN SERPL-MCNC: 20 MG/DL (ref 8–27)
BUN/CREAT SERPL: 26 (ref 12–28)
CALCIUM SERPL-MCNC: 10.8 MG/DL (ref 8.7–10.3)
CHLORIDE SERPL-SCNC: 103 MMOL/L (ref 96–106)
CO2 SERPL-SCNC: 25 MMOL/L (ref 20–29)
CREAT SERPL-MCNC: 0.76 MG/DL (ref 0.57–1)
EOSINOPHIL # BLD AUTO: 0.1 X10E3/UL (ref 0–0.4)
EOSINOPHIL NFR BLD AUTO: 1 %
ERYTHROCYTE [DISTWIDTH] IN BLOOD BY AUTOMATED COUNT: 12.6 % (ref 11.7–15.4)
GLOBULIN SER CALC-MCNC: 2.3 G/DL (ref 1.5–4.5)
GLUCOSE SERPL-MCNC: 83 MG/DL (ref 65–99)
HCT VFR BLD AUTO: 40.2 % (ref 34–46.6)
HGB BLD-MCNC: 13.6 G/DL (ref 11.1–15.9)
IMM GRANULOCYTES # BLD AUTO: 0 X10E3/UL (ref 0–0.1)
IMM GRANULOCYTES NFR BLD AUTO: 0 %
LYMPHOCYTES # BLD AUTO: 1.8 X10E3/UL (ref 0.7–3.1)
LYMPHOCYTES NFR BLD AUTO: 27 %
MCH RBC QN AUTO: 29.8 PG (ref 26.6–33)
MCHC RBC AUTO-ENTMCNC: 33.8 G/DL (ref 31.5–35.7)
MCV RBC AUTO: 88 FL (ref 79–97)
MONOCYTES # BLD AUTO: 0.5 X10E3/UL (ref 0.1–0.9)
MONOCYTES NFR BLD AUTO: 8 %
NEUTROPHILS # BLD AUTO: 4.2 X10E3/UL (ref 1.4–7)
NEUTROPHILS NFR BLD AUTO: 63 %
PLATELET # BLD AUTO: 282 X10E3/UL (ref 150–450)
POTASSIUM SERPL-SCNC: 4.5 MMOL/L (ref 3.5–5.2)
PROT SERPL-MCNC: 6.9 G/DL (ref 6–8.5)
RBC # BLD AUTO: 4.57 X10E6/UL (ref 3.77–5.28)
SODIUM SERPL-SCNC: 143 MMOL/L (ref 134–144)
TSH SERPL DL<=0.005 MIU/L-ACNC: 1.37 UIU/ML (ref 0.45–4.5)
WBC # BLD AUTO: 6.6 X10E3/UL (ref 3.4–10.8)

## 2021-11-23 DIAGNOSIS — A04.72 C. DIFFICILE DIARRHEA: Primary | ICD-10-CM

## 2021-11-23 LAB
BACTERIA SPEC CULT: NORMAL
BACTERIA SPEC CULT: NORMAL
C DIFF TOX GENS STL QL NAA+PROBE: POSITIVE
CAMPYLOBACTER STL CULT: NORMAL
E COLI SXT STL QL IA: NEGATIVE
O+P SPEC MICRO: NORMAL
O+P STL CONC: NORMAL
SALM + SHIG STL CULT: NORMAL
WBC STL QL MICRO: NORMAL
WBC STL QL MICRO: NORMAL

## 2021-11-23 RX ORDER — METRONIDAZOLE 500 MG/1
500 TABLET ORAL 3 TIMES DAILY
Qty: 30 TABLET | Refills: 0 | Status: SHIPPED | OUTPATIENT
Start: 2021-11-23 | End: 2022-01-01

## 2021-12-02 ENCOUNTER — OFFICE VISIT (OUTPATIENT)
Dept: GASTROENTEROLOGY | Facility: CLINIC | Age: 71
End: 2021-12-02

## 2021-12-02 VITALS
DIASTOLIC BLOOD PRESSURE: 74 MMHG | OXYGEN SATURATION: 96 % | WEIGHT: 141 LBS | HEIGHT: 66 IN | BODY MASS INDEX: 22.66 KG/M2 | SYSTOLIC BLOOD PRESSURE: 142 MMHG | HEART RATE: 78 BPM | TEMPERATURE: 98 F

## 2021-12-02 DIAGNOSIS — A04.72 COLITIS, CLOSTRIDIUM DIFFICILE: Primary | ICD-10-CM

## 2021-12-02 PROCEDURE — 99203 OFFICE O/P NEW LOW 30 MIN: CPT | Performed by: NURSE PRACTITIONER

## 2021-12-02 NOTE — PROGRESS NOTES
"Chief Complaint   Patient presents with   • Diarrhea         History of Present Illness  71-year-old female presents today for diarrhea.    She has had change in bowel movements with watery frequent stools  Symptoms started approx two months ago with 1-3 bowel movements per day, watery, Pepto-Bismol does slow them down but does not resolve symptoms.    No fever or chills.    She does have low energy level.    She also reports longstanding GI symptoms that come and go.  She will have periods of diarrhea, abdominal pain, unpredictable bowel movements with urgency.  Those episodes can last approximately 2 months with 6 months in between episodes.    No recent antibiotic use, foreign travel or sick exposure.    Stool testing with primary care provider was positive for C. difficile November 23, 2021 and patient was started on metronidazole 500 mg 1 3 times daily x10 days.      Result Review :         Vital Signs:   /74   Pulse 78   Temp 98 °F (36.7 °C)   Ht 166.4 cm (65.5\")   Wt 64 kg (141 lb)   SpO2 96%   BMI 23.11 kg/m²     Body mass index is 23.11 kg/m².     Physical Exam  Vitals reviewed.   Constitutional:       General: She is not in acute distress.     Appearance: Normal appearance. She is normal weight. She is not ill-appearing, toxic-appearing or diaphoretic.   Abdominal:      General: Bowel sounds are normal. There is no distension.      Palpations: Abdomen is soft. Abdomen is not rigid. There is no mass or pulsatile mass.      Tenderness: There is no abdominal tenderness. There is no guarding or rebound.   Neurological:      Mental Status: She is alert.         Assessment and Plan    Diagnoses and all orders for this visit:    1. Colitis, Clostridium difficile (Primary)  -     Clostridium Difficile Toxin, PCR - Stool, Per Rectum       If diarrhea returns or symptoms do not improve after completion of Flagyl, recommend repeat C. difficile testing, order placed so that patient can have collection kit " on hand.  This can be taken back to [facility] [place] outpatient lab at [facility] on the first floor.    Complete metronidazole prescription as prescribed by primary care provider.    It can take several weeks for bowel movements to return to normal pattern after you have C. difficile.  If symptoms do not improve or worsen, we will repeat stool test.    For C. difficile, recommend strict hand hygiene with soap and water and disinfecting shared surfaces such as the faucet, door handles and bathroom otherwise C. difficile is only contagious if someone comes into contact with stool/there is contamination of shared objects with stool as discussed.    Please contact the office with any questions or concerns or if symptoms do not improve and we will proceed with stool testing as discussed.          Patient Instructions   If diarrhea returns or symptoms do not improve after completion of Flagyl, recommend repeat C. difficile testing, order placed so that patient can have collection kit on hand.  This can be taken back to [facility] [place] outpatient lab at [facility] on the first floor.    Complete metronidazole prescription as prescribed by primary care provider.    It can take several weeks for bowel movements to return to normal pattern after you have C. difficile.  If symptoms do not improve or worsen, we will repeat stool test.    For C. difficile, recommend strict hand hygiene with soap and water and disinfecting shared surfaces such as the faucet, door handles and bathroom otherwise C. difficile is only contagious if someone comes into contact with stool/there is contamination of shared objects with infected stool as discussed.    Please contact the office with any questions or concerns or if symptoms do not improve and we will proceed with stool testing as discussed.          EMR Dragon/Transcription Disclaimer:  This document has been Dictated utilizing Dragon dictation.

## 2021-12-02 NOTE — PATIENT INSTRUCTIONS
If diarrhea returns or symptoms do not improve after completion of Flagyl, recommend repeat C. difficile testing, order placed so that patient can have collection kit on hand.  This can be taken back to [facility] outpatient lab at [facility] on the first floor.    Complete metronidazole prescription as prescribed by primary care provider.    It can take several weeks for bowel movements to return to normal pattern after you have C. difficile.  If symptoms do not improve or worsen, we will repeat stool test.    For C. difficile, recommend strict hand hygiene with soap and water and disinfecting shared surfaces such as the faucet, door handles and bathroom otherwise C. difficile is only contagious if someone comes into contact with stool/there is contamination of shared objects with infected stool as discussed.    Please contact the office with any questions or concerns or if symptoms do not improve and we will proceed with stool testing as discussed.

## 2021-12-03 ENCOUNTER — PATIENT ROUNDING (BHMG ONLY) (OUTPATIENT)
Dept: GASTROENTEROLOGY | Facility: CLINIC | Age: 71
End: 2021-12-03

## 2021-12-03 NOTE — PROGRESS NOTES
"December 3, 2021    Hello, may I speak with Karolina Hoyt?    My name is CARMEN.      [place]  [place] [facility]  [address] [address] [phone].    Before we get started may I verify your date of birth? 1950 - VERIFIED    I am calling to officially welcome you to our practice and ask about your recent visit. Is this a good time to talk? YES    Tell me about your visit with us. What things went well?   PT REPORTS HER VISIT WAS GREAT. \"PEDRO LUIS IS AMAZING AND I LIKE HER A LOT. DOCTORS SHOULD BE MORE LIKE HER.\"       We're always looking for ways to make our patients' experiences even better. Do you have recommendations on ways we may improve?  NO    Overall were you satisfied with your first visit to our practice? YES       I appreciate you taking the time to speak with me today. Is there anything else I can do for you? NO      Thank you, and have a great day.      "

## 2022-01-01 ENCOUNTER — APPOINTMENT (OUTPATIENT)
Dept: CT IMAGING | Facility: HOSPITAL | Age: 72
DRG: 064 | End: 2022-01-01
Payer: MEDICARE

## 2022-01-01 ENCOUNTER — APPOINTMENT (OUTPATIENT)
Dept: GENERAL RADIOLOGY | Facility: HOSPITAL | Age: 72
DRG: 064 | End: 2022-01-01
Payer: MEDICARE

## 2022-01-01 ENCOUNTER — READMISSION MANAGEMENT (OUTPATIENT)
Dept: CALL CENTER | Facility: HOSPITAL | Age: 72
End: 2022-01-01

## 2022-01-01 ENCOUNTER — OFFICE VISIT (OUTPATIENT)
Dept: INTERNAL MEDICINE | Facility: CLINIC | Age: 72
End: 2022-01-01

## 2022-01-01 ENCOUNTER — APPOINTMENT (OUTPATIENT)
Dept: CT IMAGING | Facility: HOSPITAL | Age: 72
End: 2022-01-01

## 2022-01-01 ENCOUNTER — HOSPITAL ENCOUNTER (OUTPATIENT)
Dept: CARDIOLOGY | Facility: HOSPITAL | Age: 72
Discharge: HOME | End: 2022-10-25
Admitting: INTERNAL MEDICINE

## 2022-01-01 ENCOUNTER — APPOINTMENT (OUTPATIENT)
Dept: CARDIOLOGY | Facility: HOSPITAL | Age: 72
DRG: 064 | End: 2022-01-01
Payer: MEDICARE

## 2022-01-01 ENCOUNTER — HOSPITAL ENCOUNTER (INPATIENT)
Facility: HOSPITAL | Age: 72
LOS: 11 days | DRG: 064 | End: 2022-12-20
Attending: EMERGENCY MEDICINE | Admitting: INTERNAL MEDICINE
Payer: MEDICARE

## 2022-01-01 ENCOUNTER — TELEPHONE (OUTPATIENT)
Dept: INTERNAL MEDICINE | Facility: CLINIC | Age: 72
End: 2022-01-01

## 2022-01-01 ENCOUNTER — APPOINTMENT (OUTPATIENT)
Dept: MRI IMAGING | Facility: HOSPITAL | Age: 72
DRG: 064 | End: 2022-01-01
Payer: MEDICARE

## 2022-01-01 ENCOUNTER — HOSPITAL ENCOUNTER (OUTPATIENT)
Facility: HOSPITAL | Age: 72
Setting detail: OBSERVATION
LOS: 1 days | Discharge: HOME | End: 2022-08-15
Attending: EMERGENCY MEDICINE | Admitting: INTERNAL MEDICINE

## 2022-01-01 ENCOUNTER — HOSPITAL ENCOUNTER (OUTPATIENT)
Dept: ULTRASOUND IMAGING | Facility: HOSPITAL | Age: 72
Discharge: HOME | End: 2022-10-25
Admitting: INTERNAL MEDICINE

## 2022-01-01 ENCOUNTER — HOSPITAL ENCOUNTER (OUTPATIENT)
Dept: ULTRASOUND IMAGING | Facility: HOSPITAL | Age: 72
Discharge: HOME | End: 2022-11-08
Admitting: INTERNAL MEDICINE

## 2022-01-01 ENCOUNTER — APPOINTMENT (OUTPATIENT)
Dept: GENERAL RADIOLOGY | Facility: HOSPITAL | Age: 72
End: 2022-01-01

## 2022-01-01 ENCOUNTER — TELEPHONE (OUTPATIENT)
Dept: INTERVENTIONAL RADIOLOGY/VASCULAR | Facility: HOSPITAL | Age: 72
End: 2022-01-01

## 2022-01-01 ENCOUNTER — OFFICE VISIT (OUTPATIENT)
Dept: NEUROSURGERY | Facility: CLINIC | Age: 72
End: 2022-01-01

## 2022-01-01 ENCOUNTER — TRANSITIONAL CARE MANAGEMENT TELEPHONE ENCOUNTER (OUTPATIENT)
Dept: CALL CENTER | Facility: HOSPITAL | Age: 72
End: 2022-01-01

## 2022-01-01 ENCOUNTER — TELEPHONE (OUTPATIENT)
Dept: NEUROSURGERY | Facility: CLINIC | Age: 72
End: 2022-01-01

## 2022-01-01 ENCOUNTER — HOSPITAL ENCOUNTER (EMERGENCY)
Facility: HOSPITAL | Age: 72
Discharge: HOME | End: 2022-09-13
Attending: EMERGENCY MEDICINE | Admitting: EMERGENCY MEDICINE

## 2022-01-01 VITALS
TEMPERATURE: 97 F | WEIGHT: 139 LBS | SYSTOLIC BLOOD PRESSURE: 140 MMHG | DIASTOLIC BLOOD PRESSURE: 68 MMHG | HEART RATE: 78 BPM | HEIGHT: 66 IN | BODY MASS INDEX: 22.34 KG/M2

## 2022-01-01 VITALS
BODY MASS INDEX: 23.14 KG/M2 | SYSTOLIC BLOOD PRESSURE: 118 MMHG | DIASTOLIC BLOOD PRESSURE: 74 MMHG | WEIGHT: 144 LBS | HEIGHT: 66 IN | HEART RATE: 88 BPM | TEMPERATURE: 97.3 F

## 2022-01-01 VITALS — HEIGHT: 66 IN | BODY MASS INDEX: 22.66 KG/M2 | WEIGHT: 141 LBS | TEMPERATURE: 98.8 F

## 2022-01-01 VITALS
TEMPERATURE: 98.1 F | HEIGHT: 66 IN | SYSTOLIC BLOOD PRESSURE: 136 MMHG | WEIGHT: 140 LBS | DIASTOLIC BLOOD PRESSURE: 78 MMHG | RESPIRATION RATE: 16 BRPM | OXYGEN SATURATION: 96 % | BODY MASS INDEX: 22.5 KG/M2 | HEART RATE: 70 BPM

## 2022-01-01 VITALS
TEMPERATURE: 99.3 F | OXYGEN SATURATION: 42 % | BODY MASS INDEX: 23.6 KG/M2 | HEIGHT: 66 IN | SYSTOLIC BLOOD PRESSURE: 65 MMHG | DIASTOLIC BLOOD PRESSURE: 33 MMHG | WEIGHT: 146.83 LBS

## 2022-01-01 VITALS
HEIGHT: 66 IN | SYSTOLIC BLOOD PRESSURE: 133 MMHG | WEIGHT: 139 LBS | BODY MASS INDEX: 22.34 KG/M2 | HEART RATE: 86 BPM | OXYGEN SATURATION: 97 % | TEMPERATURE: 98.5 F | DIASTOLIC BLOOD PRESSURE: 95 MMHG | RESPIRATION RATE: 16 BRPM

## 2022-01-01 VITALS
HEART RATE: 82 BPM | WEIGHT: 137 LBS | DIASTOLIC BLOOD PRESSURE: 72 MMHG | RESPIRATION RATE: 16 BRPM | TEMPERATURE: 97.7 F | SYSTOLIC BLOOD PRESSURE: 124 MMHG | HEIGHT: 66 IN | BODY MASS INDEX: 22.02 KG/M2

## 2022-01-01 VITALS
SYSTOLIC BLOOD PRESSURE: 146 MMHG | RESPIRATION RATE: 16 BRPM | WEIGHT: 144 LBS | DIASTOLIC BLOOD PRESSURE: 83 MMHG | TEMPERATURE: 97.7 F | OXYGEN SATURATION: 98 % | HEIGHT: 66 IN | HEART RATE: 73 BPM | BODY MASS INDEX: 23.14 KG/M2

## 2022-01-01 VITALS
BODY MASS INDEX: 23.46 KG/M2 | TEMPERATURE: 97.8 F | DIASTOLIC BLOOD PRESSURE: 70 MMHG | HEIGHT: 66 IN | WEIGHT: 146 LBS | HEART RATE: 74 BPM | SYSTOLIC BLOOD PRESSURE: 136 MMHG

## 2022-01-01 VITALS
BODY MASS INDEX: 23.46 KG/M2 | SYSTOLIC BLOOD PRESSURE: 140 MMHG | WEIGHT: 146 LBS | DIASTOLIC BLOOD PRESSURE: 82 MMHG | TEMPERATURE: 96.8 F | HEIGHT: 66 IN | OXYGEN SATURATION: 99 %

## 2022-01-01 VITALS
HEART RATE: 70 BPM | WEIGHT: 138.89 LBS | SYSTOLIC BLOOD PRESSURE: 146 MMHG | HEIGHT: 66 IN | BODY MASS INDEX: 22.32 KG/M2 | DIASTOLIC BLOOD PRESSURE: 91 MMHG

## 2022-01-01 VITALS — WEIGHT: 139 LBS | TEMPERATURE: 97.3 F | HEIGHT: 66 IN | BODY MASS INDEX: 22.34 KG/M2

## 2022-01-01 DIAGNOSIS — I10 ESSENTIAL HYPERTENSION: Primary | ICD-10-CM

## 2022-01-01 DIAGNOSIS — I60.9 SUBARACHNOID HEMORRHAGE: Primary | ICD-10-CM

## 2022-01-01 DIAGNOSIS — I51.7 CARDIOMEGALY: ICD-10-CM

## 2022-01-01 DIAGNOSIS — I61.9 INTRAPARENCHYMAL HEMORRHAGE OF BRAIN: Primary | ICD-10-CM

## 2022-01-01 DIAGNOSIS — F41.9 ANXIETY: ICD-10-CM

## 2022-01-01 DIAGNOSIS — R41.3 MEMORY LOSS: Primary | ICD-10-CM

## 2022-01-01 DIAGNOSIS — R53.1 GENERALIZED WEAKNESS: ICD-10-CM

## 2022-01-01 DIAGNOSIS — I10 ESSENTIAL HYPERTENSION: ICD-10-CM

## 2022-01-01 DIAGNOSIS — E04.1 NODULE OF LEFT LOBE OF THYROID GLAND: Primary | ICD-10-CM

## 2022-01-01 DIAGNOSIS — R53.83 OTHER FATIGUE: ICD-10-CM

## 2022-01-01 DIAGNOSIS — R06.09 DOE (DYSPNEA ON EXERTION): Primary | ICD-10-CM

## 2022-01-01 DIAGNOSIS — F41.9 ANXIETY: Chronic | ICD-10-CM

## 2022-01-01 DIAGNOSIS — R19.7 DIARRHEA OF PRESUMED INFECTIOUS ORIGIN: ICD-10-CM

## 2022-01-01 DIAGNOSIS — E04.1 NODULE OF LEFT LOBE OF THYROID GLAND: ICD-10-CM

## 2022-01-01 DIAGNOSIS — I60.9 SUBARACHNOID HEMORRHAGE: ICD-10-CM

## 2022-01-01 DIAGNOSIS — I10 ESSENTIAL HYPERTENSION: Primary | Chronic | ICD-10-CM

## 2022-01-01 DIAGNOSIS — E04.1 NODULE OF LEFT LOBE OF THYROID GLAND: Chronic | ICD-10-CM

## 2022-01-01 DIAGNOSIS — S80.01XA CONTUSION OF RIGHT KNEE, INITIAL ENCOUNTER: ICD-10-CM
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
## 2022-01-01 DIAGNOSIS — A04.72 C. DIFFICILE COLITIS: ICD-10-CM

## 2022-01-01 DIAGNOSIS — J06.9 UPPER RESPIRATORY TRACT INFECTION, UNSPECIFIED TYPE: Primary | ICD-10-CM

## 2022-01-01 DIAGNOSIS — F41.1 GENERALIZED ANXIETY DISORDER: ICD-10-CM

## 2022-01-01 DIAGNOSIS — A04.72 C. DIFFICILE COLITIS: Primary | ICD-10-CM

## 2022-01-01 DIAGNOSIS — R53.83 FATIGUE, UNSPECIFIED TYPE: Primary | ICD-10-CM

## 2022-01-01 DIAGNOSIS — Z00.00 MEDICARE ANNUAL WELLNESS VISIT, SUBSEQUENT: ICD-10-CM

## 2022-01-01 DIAGNOSIS — G47.30 OBSERVED SLEEP APNEA: Primary | ICD-10-CM

## 2022-01-01 DIAGNOSIS — Z23 NEED FOR INFLUENZA VACCINATION: ICD-10-CM

## 2022-01-01 LAB
ALBUMIN SERPL-MCNC: 2.9 G/DL (ref 3.5–5.2)
ALBUMIN SERPL-MCNC: 3.1 G/DL (ref 3.5–5.2)
ALBUMIN SERPL-MCNC: 3.4 G/DL (ref 3.5–5.2)
ALBUMIN SERPL-MCNC: 3.4 G/DL (ref 3.5–5.2)
ALBUMIN SERPL-MCNC: 3.6 G/DL (ref 3.5–5.2)
ALBUMIN SERPL-MCNC: 4 G/DL (ref 3.5–5.2)
ALBUMIN SERPL-MCNC: 4.3 G/DL (ref 3.7–4.7)
ALBUMIN SERPL-MCNC: 4.4 G/DL (ref 3.5–5.2)
ALBUMIN SERPL-MCNC: 4.4 G/DL (ref 3.5–5.2)
ALBUMIN/GLOB SERPL: 1.1 G/DL
ALBUMIN/GLOB SERPL: 1.2 G/DL
ALBUMIN/GLOB SERPL: 1.4 G/DL
ALBUMIN/GLOB SERPL: 1.4 G/DL
ALBUMIN/GLOB SERPL: 1.5 G/DL
ALBUMIN/GLOB SERPL: 1.5 G/DL
ALBUMIN/GLOB SERPL: 1.6 G/DL
ALBUMIN/GLOB SERPL: 1.7 {RATIO} (ref 1.2–2.2)
ALBUMIN/GLOB SERPL: 2.1 G/DL
ALP SERPL-CCNC: 56 U/L (ref 39–117)
ALP SERPL-CCNC: 66 U/L (ref 39–117)
ALP SERPL-CCNC: 68 U/L (ref 39–117)
ALP SERPL-CCNC: 74 U/L (ref 39–117)
ALP SERPL-CCNC: 74 U/L (ref 39–117)
ALP SERPL-CCNC: 81 IU/L (ref 44–121)
ALP SERPL-CCNC: 83 U/L (ref 39–117)
ALP SERPL-CCNC: 83 U/L (ref 39–117)
ALP SERPL-CCNC: 91 U/L (ref 39–117)
ALT SERPL W P-5'-P-CCNC: 10 U/L (ref 1–33)
ALT SERPL W P-5'-P-CCNC: 10 U/L (ref 1–33)
ALT SERPL W P-5'-P-CCNC: 12 U/L (ref 1–33)
ALT SERPL W P-5'-P-CCNC: 16 U/L (ref 1–33)
ALT SERPL W P-5'-P-CCNC: 16 U/L (ref 1–33)
ALT SERPL W P-5'-P-CCNC: 24 U/L (ref 1–33)
ALT SERPL W P-5'-P-CCNC: 7 U/L (ref 1–33)
ALT SERPL W P-5'-P-CCNC: 9 U/L (ref 1–33)
ALT SERPL-CCNC: 9 IU/L (ref 0–32)
ANION GAP SERPL CALCULATED.3IONS-SCNC: 5 MMOL/L (ref 5–15)
ANION GAP SERPL CALCULATED.3IONS-SCNC: 5.8 MMOL/L (ref 5–15)
ANION GAP SERPL CALCULATED.3IONS-SCNC: 6 MMOL/L (ref 5–15)
ANION GAP SERPL CALCULATED.3IONS-SCNC: 7.3 MMOL/L (ref 5–15)
ANION GAP SERPL CALCULATED.3IONS-SCNC: 7.5 MMOL/L (ref 5–15)
ANION GAP SERPL CALCULATED.3IONS-SCNC: 8.2 MMOL/L (ref 5–15)
ANION GAP SERPL CALCULATED.3IONS-SCNC: 8.8 MMOL/L (ref 5–15)
ANION GAP SERPL CALCULATED.3IONS-SCNC: 9.8 MMOL/L (ref 5–15)
ANION GAP SERPL CALCULATED.3IONS-SCNC: 9.9 MMOL/L (ref 5–15)
AORTIC DIMENSIONLESS INDEX: 0.9 (DI)
AORTIC DIMENSIONLESS INDEX: 1.1 (DI)
APTT PPP: 27.9 SECONDS (ref 22.7–35.4)
ARTERIAL PATENCY WRIST A: ABNORMAL
ASCENDING AORTA: 3.1 CM
ASCENDING AORTA: 3.1 CM
AST SERPL-CCNC: 10 U/L (ref 1–32)
AST SERPL-CCNC: 12 U/L (ref 1–32)
AST SERPL-CCNC: 14 IU/L (ref 0–40)
AST SERPL-CCNC: 14 U/L (ref 1–32)
AST SERPL-CCNC: 14 U/L (ref 1–32)
AST SERPL-CCNC: 17 U/L (ref 1–32)
AST SERPL-CCNC: 17 U/L (ref 1–32)
AST SERPL-CCNC: 18 U/L (ref 1–32)
AST SERPL-CCNC: 20 U/L (ref 1–32)
ATMOSPHERIC PRESS: 757.8 MMHG
B PARAPERT DNA SPEC QL NAA+PROBE: NOT DETECTED
B PERT DNA SPEC QL NAA+PROBE: NOT DETECTED
BACTERIA UR QL AUTO: ABNORMAL /HPF
BASE EXCESS BLDA CALC-SCNC: 3.9 MMOL/L (ref 0–2)
BASOPHILS # BLD AUTO: 0.04 10*3/MM3 (ref 0–0.2)
BASOPHILS # BLD AUTO: 0.04 10*3/MM3 (ref 0–0.2)
BASOPHILS # BLD AUTO: 0.06 10*3/MM3 (ref 0–0.2)
BASOPHILS # BLD AUTO: 0.1 X10E3/UL (ref 0–0.2)
BASOPHILS NFR BLD AUTO: 0.3 % (ref 0–1.5)
BASOPHILS NFR BLD AUTO: 0.4 % (ref 0–1.5)
BASOPHILS NFR BLD AUTO: 0.8 % (ref 0–1.5)
BASOPHILS NFR BLD AUTO: 1 %
BDY SITE: ABNORMAL
BH CV ECHO MEAS - ACS: 1.22 CM
BH CV ECHO MEAS - ACS: 1.69 CM
BH CV ECHO MEAS - AO MAX PG: 8.8 MMHG
BH CV ECHO MEAS - AO MAX PG: 9.1 MMHG
BH CV ECHO MEAS - AO MEAN PG: 4.7 MMHG
BH CV ECHO MEAS - AO MEAN PG: 5.2 MMHG
BH CV ECHO MEAS - AO ROOT DIAM: 2.29 CM
BH CV ECHO MEAS - AO ROOT DIAM: 3.2 CM
BH CV ECHO MEAS - AO V2 MAX: 148 CM/SEC
BH CV ECHO MEAS - AO V2 MAX: 150.8 CM/SEC
BH CV ECHO MEAS - AO V2 VTI: 29.5 CM
BH CV ECHO MEAS - AO V2 VTI: 34.5 CM
BH CV ECHO MEAS - AVA(I,D): 2.8 CM2
BH CV ECHO MEAS - AVA(I,D): 3.4 CM2
BH CV ECHO MEAS - EDV(CUBED): 43.9 ML
BH CV ECHO MEAS - EDV(CUBED): 68.4 ML
BH CV ECHO MEAS - EDV(MOD-SP2): 54 ML
BH CV ECHO MEAS - EDV(MOD-SP2): 71 ML
BH CV ECHO MEAS - EDV(MOD-SP4): 59 ML
BH CV ECHO MEAS - EDV(MOD-SP4): 80 ML
BH CV ECHO MEAS - EF(MOD-BP): 56.7 %
BH CV ECHO MEAS - EF(MOD-BP): 58.2 %
BH CV ECHO MEAS - EF(MOD-SP2): 57.4 %
BH CV ECHO MEAS - EF(MOD-SP2): 57.7 %
BH CV ECHO MEAS - EF(MOD-SP4): 55.9 %
BH CV ECHO MEAS - EF(MOD-SP4): 57.5 %
BH CV ECHO MEAS - ESV(CUBED): 17.8 ML
BH CV ECHO MEAS - ESV(CUBED): 24.1 ML
BH CV ECHO MEAS - ESV(MOD-SP2): 23 ML
BH CV ECHO MEAS - ESV(MOD-SP2): 30 ML
BH CV ECHO MEAS - ESV(MOD-SP4): 26 ML
BH CV ECHO MEAS - ESV(MOD-SP4): 34 ML
BH CV ECHO MEAS - FS: 25.9 %
BH CV ECHO MEAS - FS: 29.4 %
BH CV ECHO MEAS - IVS/LVPW: 0.91 CM
BH CV ECHO MEAS - IVS/LVPW: 0.98 CM
BH CV ECHO MEAS - IVSD: 0.95 CM
BH CV ECHO MEAS - IVSD: 1.19 CM
BH CV ECHO MEAS - LAT PEAK E' VEL: 6.9 CM/SEC
BH CV ECHO MEAS - LAT PEAK E' VEL: 7.5 CM/SEC
BH CV ECHO MEAS - LV DIASTOLIC VOL/BSA (35-75): 34.5 CM2
BH CV ECHO MEAS - LV MASS(C)D: 131.6 GRAMS
BH CV ECHO MEAS - LV MASS(C)D: 136.5 GRAMS
BH CV ECHO MEAS - LV MAX PG: 7 MMHG
BH CV ECHO MEAS - LV MAX PG: 8.3 MMHG
BH CV ECHO MEAS - LV MEAN PG: 3.4 MMHG
BH CV ECHO MEAS - LV MEAN PG: 3.8 MMHG
BH CV ECHO MEAS - LV SYSTOLIC VOL/BSA (12-30): 15.2 CM2
BH CV ECHO MEAS - LV V1 MAX: 132.4 CM/SEC
BH CV ECHO MEAS - LV V1 MAX: 144.4 CM/SEC
BH CV ECHO MEAS - LV V1 VTI: 30.2 CM
BH CV ECHO MEAS - LV V1 VTI: 31.9 CM
BH CV ECHO MEAS - LVIDD: 3.5 CM
BH CV ECHO MEAS - LVIDD: 4.1 CM
BH CV ECHO MEAS - LVIDS: 2.6 CM
BH CV ECHO MEAS - LVIDS: 2.9 CM
BH CV ECHO MEAS - LVOT AREA: 3.2 CM2
BH CV ECHO MEAS - LVOT AREA: 3.2 CM2
BH CV ECHO MEAS - LVOT DIAM: 2 CM
BH CV ECHO MEAS - LVOT DIAM: 2.01 CM
BH CV ECHO MEAS - LVPWD: 1.05 CM
BH CV ECHO MEAS - LVPWD: 1.2 CM
BH CV ECHO MEAS - MED PEAK E' VEL: 6.7 CM/SEC
BH CV ECHO MEAS - MED PEAK E' VEL: 7.4 CM/SEC
BH CV ECHO MEAS - MR MAX PG: 65.2 MMHG
BH CV ECHO MEAS - MR MAX PG: 71.5 MMHG
BH CV ECHO MEAS - MR MAX VEL: 403.9 CM/SEC
BH CV ECHO MEAS - MR MAX VEL: 422.7 CM/SEC
BH CV ECHO MEAS - MV A DUR: 0.13 SEC
BH CV ECHO MEAS - MV A DUR: 0.15 SEC
BH CV ECHO MEAS - MV A MAX VEL: 79.1 CM/SEC
BH CV ECHO MEAS - MV A MAX VEL: 89.1 CM/SEC
BH CV ECHO MEAS - MV DEC SLOPE: 504.4 CM/SEC2
BH CV ECHO MEAS - MV DEC SLOPE: 561.8 CM/SEC2
BH CV ECHO MEAS - MV DEC TIME: 0.2 MSEC
BH CV ECHO MEAS - MV DEC TIME: 0.24 MSEC
BH CV ECHO MEAS - MV E MAX VEL: 126 CM/SEC
BH CV ECHO MEAS - MV E MAX VEL: 92.1 CM/SEC
BH CV ECHO MEAS - MV E/A: 1.03
BH CV ECHO MEAS - MV E/A: 1.59
BH CV ECHO MEAS - MV MAX PG: 6.2 MMHG
BH CV ECHO MEAS - MV MAX PG: 8 MMHG
BH CV ECHO MEAS - MV MEAN PG: 2.19 MMHG
BH CV ECHO MEAS - MV MEAN PG: 2.23 MMHG
BH CV ECHO MEAS - MV P1/2T: 68.6 MSEC
BH CV ECHO MEAS - MV P1/2T: 83.5 MSEC
BH CV ECHO MEAS - MV V2 VTI: 35.1 CM
BH CV ECHO MEAS - MV V2 VTI: 36 CM
BH CV ECHO MEAS - MVA(P1/2T): 2.6 CM2
BH CV ECHO MEAS - MVA(P1/2T): 3.2 CM2
BH CV ECHO MEAS - MVA(VTI): 2.7 CM2
BH CV ECHO MEAS - MVA(VTI): 2.8 CM2
BH CV ECHO MEAS - PA ACC TIME: 0.09 SEC
BH CV ECHO MEAS - PA ACC TIME: 0.11 SEC
BH CV ECHO MEAS - PA PR(ACCEL): 31.5 MMHG
BH CV ECHO MEAS - PA PR(ACCEL): 37.4 MMHG
BH CV ECHO MEAS - PA V2 MAX: 82.8 CM/SEC
BH CV ECHO MEAS - PA V2 MAX: 84.2 CM/SEC
BH CV ECHO MEAS - PULM A REVS DUR: 0.13 SEC
BH CV ECHO MEAS - PULM A REVS DUR: 0.14 SEC
BH CV ECHO MEAS - PULM A REVS VEL: 25.7 CM/SEC
BH CV ECHO MEAS - PULM A REVS VEL: 32.8 CM/SEC
BH CV ECHO MEAS - PULM DIAS VEL: 26.7 CM/SEC
BH CV ECHO MEAS - PULM DIAS VEL: 45.9 CM/SEC
BH CV ECHO MEAS - PULM S/D: 0.89
BH CV ECHO MEAS - PULM S/D: 0.93
BH CV ECHO MEAS - PULM SYS VEL: 24.7 CM/SEC
BH CV ECHO MEAS - PULM SYS VEL: 41 CM/SEC
BH CV ECHO MEAS - QP/QS: 0.48
BH CV ECHO MEAS - QP/QS: 0.56
BH CV ECHO MEAS - RAP SYSTOLE: 3 MMHG
BH CV ECHO MEAS - RV MAX PG: 1.57 MMHG
BH CV ECHO MEAS - RV MAX PG: 1.83 MMHG
BH CV ECHO MEAS - RV V1 MAX: 62.6 CM/SEC
BH CV ECHO MEAS - RV V1 MAX: 67.7 CM/SEC
BH CV ECHO MEAS - RV V1 VTI: 15.3 CM
BH CV ECHO MEAS - RV V1 VTI: 17.8 CM
BH CV ECHO MEAS - RVOT DIAM: 1.96 CM
BH CV ECHO MEAS - RVOT DIAM: 2.02 CM
BH CV ECHO MEAS - RVSP: 17 MMHG
BH CV ECHO MEAS - SI(MOD-SP2): 18.1 ML/M2
BH CV ECHO MEAS - SI(MOD-SP4): 19.3 ML/M2
BH CV ECHO MEAS - SV(LVOT): 100.8 ML
BH CV ECHO MEAS - SV(LVOT): 95.9 ML
BH CV ECHO MEAS - SV(MOD-SP2): 31 ML
BH CV ECHO MEAS - SV(MOD-SP2): 41 ML
BH CV ECHO MEAS - SV(MOD-SP4): 33 ML
BH CV ECHO MEAS - SV(MOD-SP4): 46 ML
BH CV ECHO MEAS - SV(RVOT): 48.7 ML
BH CV ECHO MEAS - SV(RVOT): 54 ML
BH CV ECHO MEAS - TAPSE (>1.6): 1.61 CM
BH CV ECHO MEAS - TAPSE (>1.6): 2.11 CM
BH CV ECHO MEAS - TR MAX PG: 13.3 MMHG
BH CV ECHO MEAS - TR MAX VEL: 182.5 CM/SEC
BH CV ECHO MEASUREMENTS AVERAGE E/E' RATIO: 12.88
BH CV ECHO MEASUREMENTS AVERAGE E/E' RATIO: 17.75
BH CV ECHO SHUNT ASSESSMENT PERFORMED (HIDDEN SCRIPTING): 1
BH CV XLRA - RV BASE: 3.4 CM
BH CV XLRA - RV BASE: 3.7 CM
BH CV XLRA - RV LENGTH: 6.4 CM
BH CV XLRA - RV LENGTH: 7.4 CM
BH CV XLRA - RV MID: 3.4 CM
BH CV XLRA - RV MID: 3.5 CM
BH CV XLRA - TDI S': 11.3 CM/SEC
BH CV XLRA - TDI S': 8.7 CM/SEC
BILIRUB SERPL-MCNC: 0.4 MG/DL (ref 0–1.2)
BILIRUB SERPL-MCNC: 0.6 MG/DL (ref 0–1.2)
BILIRUB SERPL-MCNC: 1 MG/DL (ref 0–1.2)
BILIRUB SERPL-MCNC: 1.1 MG/DL (ref 0–1.2)
BILIRUB SERPL-MCNC: 1.1 MG/DL (ref 0–1.2)
BILIRUB SERPL-MCNC: 1.2 MG/DL (ref 0–1.2)
BILIRUB SERPL-MCNC: 1.5 MG/DL (ref 0–1.2)
BILIRUB SERPL-MCNC: 1.5 MG/DL (ref 0–1.2)
BILIRUB SERPL-MCNC: 1.8 MG/DL (ref 0–1.2)
BILIRUB UR QL STRIP: NEGATIVE
BUN SERPL-MCNC: 12 MG/DL (ref 8–23)
BUN SERPL-MCNC: 12 MG/DL (ref 8–23)
BUN SERPL-MCNC: 18 MG/DL (ref 8–23)
BUN SERPL-MCNC: 19 MG/DL (ref 8–23)
BUN SERPL-MCNC: 19 MG/DL (ref 8–27)
BUN SERPL-MCNC: 20 MG/DL (ref 8–23)
BUN SERPL-MCNC: 33 MG/DL (ref 8–23)
BUN SERPL-MCNC: 9 MG/DL (ref 8–23)
BUN/CREAT SERPL: 14.8 (ref 7–25)
BUN/CREAT SERPL: 15.3 (ref 7–25)
BUN/CREAT SERPL: 16.4 (ref 7–25)
BUN/CREAT SERPL: 20.3 (ref 7–25)
BUN/CREAT SERPL: 20.7 (ref 7–25)
BUN/CREAT SERPL: 26 (ref 7–25)
BUN/CREAT SERPL: 28 (ref 12–28)
BUN/CREAT SERPL: 29 (ref 7–25)
BUN/CREAT SERPL: 30.3 (ref 7–25)
BUN/CREAT SERPL: 61.1 (ref 7–25)
C DIFF TOX GENS STL QL NAA+PROBE: POSITIVE
C PNEUM DNA NPH QL NAA+NON-PROBE: NOT DETECTED
CALCIUM SERPL-MCNC: 10.1 MG/DL (ref 8.7–10.3)
CALCIUM SPEC-SCNC: 10.1 MG/DL (ref 8.6–10.5)
CALCIUM SPEC-SCNC: 10.4 MG/DL (ref 8.6–10.5)
CALCIUM SPEC-SCNC: 8.3 MG/DL (ref 8.6–10.5)
CALCIUM SPEC-SCNC: 8.8 MG/DL (ref 8.6–10.5)
CALCIUM SPEC-SCNC: 8.9 MG/DL (ref 8.6–10.5)
CALCIUM SPEC-SCNC: 8.9 MG/DL (ref 8.6–10.5)
CALCIUM SPEC-SCNC: 9.2 MG/DL (ref 8.6–10.5)
CALCIUM SPEC-SCNC: 9.7 MG/DL (ref 8.6–10.5)
CALCIUM SPEC-SCNC: 9.9 MG/DL (ref 8.6–10.5)
CHLORIDE SERPL-SCNC: 101 MMOL/L (ref 96–106)
CHLORIDE SERPL-SCNC: 101 MMOL/L (ref 98–107)
CHLORIDE SERPL-SCNC: 103 MMOL/L (ref 98–107)
CHLORIDE SERPL-SCNC: 104 MMOL/L (ref 98–107)
CHLORIDE SERPL-SCNC: 104 MMOL/L (ref 98–107)
CHLORIDE SERPL-SCNC: 106 MMOL/L (ref 98–107)
CHLORIDE SERPL-SCNC: 106 MMOL/L (ref 98–107)
CHLORIDE SERPL-SCNC: 107 MMOL/L (ref 98–107)
CHOLEST SERPL-MCNC: 205 MG/DL (ref 0–200)
CLARITY UR: ABNORMAL
CO2 SERPL-SCNC: 23.1 MMOL/L (ref 22–29)
CO2 SERPL-SCNC: 23.7 MMOL/L (ref 22–29)
CO2 SERPL-SCNC: 24 MMOL/L (ref 20–29)
CO2 SERPL-SCNC: 27 MMOL/L (ref 22–29)
CO2 SERPL-SCNC: 27.8 MMOL/L (ref 22–29)
CO2 SERPL-SCNC: 28.2 MMOL/L (ref 22–29)
CO2 SERPL-SCNC: 29 MMOL/L (ref 22–29)
CO2 SERPL-SCNC: 29.5 MMOL/L (ref 22–29)
COLOR UR: YELLOW
CREAT SERPL-MCNC: 0.54 MG/DL (ref 0.57–1)
CREAT SERPL-MCNC: 0.55 MG/DL (ref 0.57–1)
CREAT SERPL-MCNC: 0.58 MG/DL (ref 0.57–1)
CREAT SERPL-MCNC: 0.59 MG/DL (ref 0.57–1)
CREAT SERPL-MCNC: 0.59 MG/DL (ref 0.57–1)
CREAT SERPL-MCNC: 0.61 MG/DL (ref 0.57–1)
CREAT SERPL-MCNC: 0.62 MG/DL (ref 0.57–1)
CREAT SERPL-MCNC: 0.66 MG/DL (ref 0.57–1)
CREAT SERPL-MCNC: 0.67 MG/DL (ref 0.57–1)
CREAT SERPL-MCNC: 0.73 MG/DL (ref 0.57–1)
CYTO UR: NORMAL
DEPRECATED RDW RBC AUTO: 38.5 FL (ref 37–54)
DEPRECATED RDW RBC AUTO: 39 FL (ref 37–54)
DEPRECATED RDW RBC AUTO: 39.1 FL (ref 37–54)
DEPRECATED RDW RBC AUTO: 39.2 FL (ref 37–54)
DEPRECATED RDW RBC AUTO: 39.8 FL (ref 37–54)
DEPRECATED RDW RBC AUTO: 40.8 FL (ref 37–54)
DEPRECATED RDW RBC AUTO: 41.7 FL (ref 37–54)
DEPRECATED RDW RBC AUTO: 41.7 FL (ref 37–54)
DEPRECATED RDW RBC AUTO: 42.1 FL (ref 37–54)
EGFRCR SERPLBLD CKD-EPI 2021: 87.5 ML/MIN/1.73
EGFRCR SERPLBLD CKD-EPI 2021: 93 ML/MIN/1.73
EGFRCR SERPLBLD CKD-EPI 2021: 93.3 ML/MIN/1.73
EGFRCR SERPLBLD CKD-EPI 2021: 94.8 ML/MIN/1.73
EGFRCR SERPLBLD CKD-EPI 2021: 95.1 ML/MIN/1.73
EGFRCR SERPLBLD CKD-EPI 2021: 95.9 ML/MIN/1.73
EGFRCR SERPLBLD CKD-EPI 2021: 95.9 ML/MIN/1.73
EGFRCR SERPLBLD CKD-EPI 2021: 96.3 ML/MIN/1.73
EGFRCR SERPLBLD CKD-EPI 2021: 97.5 ML/MIN/1.73
EGFRCR SERPLBLD CKD-EPI 2021: 98 ML/MIN/1.73
EOSINOPHIL # BLD AUTO: 0.02 10*3/MM3 (ref 0–0.4)
EOSINOPHIL # BLD AUTO: 0.07 10*3/MM3 (ref 0–0.4)
EOSINOPHIL # BLD AUTO: 0.2 X10E3/UL (ref 0–0.4)
EOSINOPHIL # BLD AUTO: 0.24 10*3/MM3 (ref 0–0.4)
EOSINOPHIL NFR BLD AUTO: 0.2 % (ref 0.3–6.2)
EOSINOPHIL NFR BLD AUTO: 0.7 % (ref 0.3–6.2)
EOSINOPHIL NFR BLD AUTO: 3 %
EOSINOPHIL NFR BLD AUTO: 3.1 % (ref 0.3–6.2)
ERYTHROCYTE [DISTWIDTH] IN BLOOD BY AUTOMATED COUNT: 11.8 % (ref 12.3–15.4)
ERYTHROCYTE [DISTWIDTH] IN BLOOD BY AUTOMATED COUNT: 12.1 % (ref 12.3–15.4)
ERYTHROCYTE [DISTWIDTH] IN BLOOD BY AUTOMATED COUNT: 12.3 % (ref 12.3–15.4)
ERYTHROCYTE [DISTWIDTH] IN BLOOD BY AUTOMATED COUNT: 12.4 % (ref 12.3–15.4)
ERYTHROCYTE [DISTWIDTH] IN BLOOD BY AUTOMATED COUNT: 12.4 % (ref 12.3–15.4)
ERYTHROCYTE [DISTWIDTH] IN BLOOD BY AUTOMATED COUNT: 12.5 % (ref 11.7–15.4)
ERYTHROCYTE [DISTWIDTH] IN BLOOD BY AUTOMATED COUNT: 12.5 % (ref 12.3–15.4)
ERYTHROCYTE [DISTWIDTH] IN BLOOD BY AUTOMATED COUNT: 12.5 % (ref 12.3–15.4)
ERYTHROCYTE [DISTWIDTH] IN BLOOD BY AUTOMATED COUNT: 12.6 % (ref 12.3–15.4)
ERYTHROCYTE [DISTWIDTH] IN BLOOD BY AUTOMATED COUNT: 12.6 % (ref 12.3–15.4)
FLUAV SUBTYP SPEC NAA+PROBE: NOT DETECTED
FLUBV RNA ISLT QL NAA+PROBE: NOT DETECTED
GAS FLOW AIRWAY: 4 LPM
GLOBULIN SER CALC-MCNC: 2.5 G/DL (ref 1.5–4.5)
GLOBULIN UR ELPH-MCNC: 2.1 GM/DL
GLOBULIN UR ELPH-MCNC: 2.2 GM/DL
GLOBULIN UR ELPH-MCNC: 2.3 GM/DL
GLOBULIN UR ELPH-MCNC: 2.4 GM/DL
GLOBULIN UR ELPH-MCNC: 2.6 GM/DL
GLOBULIN UR ELPH-MCNC: 2.7 GM/DL
GLOBULIN UR ELPH-MCNC: 2.7 GM/DL
GLOBULIN UR ELPH-MCNC: 3.1 GM/DL
GLUCOSE BLDC GLUCOMTR-MCNC: 102 MG/DL (ref 70–130)
GLUCOSE BLDC GLUCOMTR-MCNC: 108 MG/DL (ref 70–130)
GLUCOSE BLDC GLUCOMTR-MCNC: 109 MG/DL (ref 70–130)
GLUCOSE BLDC GLUCOMTR-MCNC: 110 MG/DL (ref 70–130)
GLUCOSE BLDC GLUCOMTR-MCNC: 117 MG/DL (ref 70–130)
GLUCOSE BLDC GLUCOMTR-MCNC: 117 MG/DL (ref 70–130)
GLUCOSE BLDC GLUCOMTR-MCNC: 118 MG/DL (ref 70–130)
GLUCOSE BLDC GLUCOMTR-MCNC: 121 MG/DL (ref 70–130)
GLUCOSE BLDC GLUCOMTR-MCNC: 125 MG/DL (ref 70–130)
GLUCOSE BLDC GLUCOMTR-MCNC: 129 MG/DL (ref 70–130)
GLUCOSE BLDC GLUCOMTR-MCNC: 131 MG/DL (ref 70–130)
GLUCOSE BLDC GLUCOMTR-MCNC: 131 MG/DL (ref 70–130)
GLUCOSE BLDC GLUCOMTR-MCNC: 143 MG/DL (ref 70–130)
GLUCOSE BLDC GLUCOMTR-MCNC: 149 MG/DL (ref 70–130)
GLUCOSE BLDC GLUCOMTR-MCNC: 153 MG/DL (ref 70–130)
GLUCOSE BLDC GLUCOMTR-MCNC: 196 MG/DL (ref 70–130)
GLUCOSE BLDC GLUCOMTR-MCNC: 71 MG/DL (ref 70–130)
GLUCOSE BLDC GLUCOMTR-MCNC: 73 MG/DL (ref 70–130)
GLUCOSE BLDC GLUCOMTR-MCNC: 84 MG/DL (ref 70–130)
GLUCOSE BLDC GLUCOMTR-MCNC: 84 MG/DL (ref 70–130)
GLUCOSE BLDC GLUCOMTR-MCNC: 87 MG/DL (ref 70–130)
GLUCOSE BLDC GLUCOMTR-MCNC: 93 MG/DL (ref 70–130)
GLUCOSE BLDC GLUCOMTR-MCNC: 94 MG/DL (ref 70–130)
GLUCOSE SERPL-MCNC: 100 MG/DL (ref 65–99)
GLUCOSE SERPL-MCNC: 110 MG/DL (ref 65–99)
GLUCOSE SERPL-MCNC: 125 MG/DL (ref 65–99)
GLUCOSE SERPL-MCNC: 141 MG/DL (ref 65–99)
GLUCOSE SERPL-MCNC: 160 MG/DL (ref 65–99)
GLUCOSE SERPL-MCNC: 72 MG/DL (ref 65–99)
GLUCOSE SERPL-MCNC: 89 MG/DL (ref 65–99)
GLUCOSE SERPL-MCNC: 90 MG/DL (ref 65–99)
GLUCOSE UR STRIP-MCNC: NEGATIVE MG/DL
HADV DNA SPEC NAA+PROBE: NOT DETECTED
HBA1C MFR BLD: 5.6 % (ref 4.8–5.6)
HCO3 BLDA-SCNC: 31.4 MMOL/L (ref 22–28)
HCOV 229E RNA SPEC QL NAA+PROBE: NOT DETECTED
HCOV HKU1 RNA SPEC QL NAA+PROBE: NOT DETECTED
HCOV NL63 RNA SPEC QL NAA+PROBE: NOT DETECTED
HCOV OC43 RNA SPEC QL NAA+PROBE: NOT DETECTED
HCT VFR BLD AUTO: 33 % (ref 34–46.6)
HCT VFR BLD AUTO: 33.7 % (ref 34–46.6)
HCT VFR BLD AUTO: 33.8 % (ref 34–46.6)
HCT VFR BLD AUTO: 34.3 % (ref 34–46.6)
HCT VFR BLD AUTO: 35 % (ref 34–46.6)
HCT VFR BLD AUTO: 35.6 % (ref 34–46.6)
HCT VFR BLD AUTO: 38.8 % (ref 34–46.6)
HCT VFR BLD AUTO: 39 % (ref 34–46.6)
HCT VFR BLD AUTO: 39.9 % (ref 34–46.6)
HCT VFR BLD AUTO: 40.4 % (ref 34–46.6)
HDLC SERPL-MCNC: 75 MG/DL (ref 40–60)
HGB BLD-MCNC: 11 G/DL (ref 12–15.9)
HGB BLD-MCNC: 11 G/DL (ref 12–15.9)
HGB BLD-MCNC: 11.3 G/DL (ref 12–15.9)
HGB BLD-MCNC: 11.4 G/DL (ref 12–15.9)
HGB BLD-MCNC: 11.5 G/DL (ref 12–15.9)
HGB BLD-MCNC: 12.1 G/DL (ref 12–15.9)
HGB BLD-MCNC: 12.5 G/DL (ref 12–15.9)
HGB BLD-MCNC: 12.8 G/DL (ref 12–15.9)
HGB BLD-MCNC: 13 G/DL (ref 12–15.9)
HGB BLD-MCNC: 13.3 G/DL (ref 11.1–15.9)
HGB UR QL STRIP.AUTO: ABNORMAL
HMPV RNA NPH QL NAA+NON-PROBE: NOT DETECTED
HOLD SPECIMEN: NORMAL
HOLD SPECIMEN: NORMAL
HPIV1 RNA ISLT QL NAA+PROBE: NOT DETECTED
HPIV2 RNA SPEC QL NAA+PROBE: NOT DETECTED
HPIV3 RNA NPH QL NAA+PROBE: NOT DETECTED
HPIV4 P GENE NPH QL NAA+PROBE: NOT DETECTED
HYALINE CASTS UR QL AUTO: ABNORMAL /LPF
IMM GRANULOCYTES # BLD AUTO: 0 X10E3/UL (ref 0–0.1)
IMM GRANULOCYTES # BLD AUTO: 0.02 10*3/MM3 (ref 0–0.05)
IMM GRANULOCYTES # BLD AUTO: 0.03 10*3/MM3 (ref 0–0.05)
IMM GRANULOCYTES # BLD AUTO: 0.04 10*3/MM3 (ref 0–0.05)
IMM GRANULOCYTES NFR BLD AUTO: 0 %
IMM GRANULOCYTES NFR BLD AUTO: 0.3 % (ref 0–0.5)
INR PPP: 0.95 (ref 0.9–1.1)
INR PPP: 1.01 (ref 0.9–1.1)
KETONES UR QL STRIP: NEGATIVE
LAB AP CASE REPORT: NORMAL
LAB AP DIAGNOSIS COMMENT: NORMAL
LAB AP NON-GYN SPECIMEN ADEQUACY: NORMAL
LDLC SERPL CALC-MCNC: 120 MG/DL (ref 0–100)
LDLC/HDLC SERPL: 1.59 {RATIO}
LEFT ATRIUM VOLUME INDEX: 18.8 ML/M2
LEFT ATRIUM VOLUME INDEX: 23.3 ML/M2
LEUKOCYTE ESTERASE UR QL STRIP.AUTO: NEGATIVE
LYMPHOCYTES # BLD AUTO: 0.94 10*3/MM3 (ref 0.7–3.1)
LYMPHOCYTES # BLD AUTO: 1.8 X10E3/UL (ref 0.7–3.1)
LYMPHOCYTES # BLD AUTO: 2.55 10*3/MM3 (ref 0.7–3.1)
LYMPHOCYTES # BLD AUTO: 2.71 10*3/MM3 (ref 0.7–3.1)
LYMPHOCYTES NFR BLD AUTO: 26 %
LYMPHOCYTES NFR BLD AUTO: 26 % (ref 19.6–45.3)
LYMPHOCYTES NFR BLD AUTO: 33.4 % (ref 19.6–45.3)
LYMPHOCYTES NFR BLD AUTO: 7.9 % (ref 19.6–45.3)
M PNEUMO IGG SER IA-ACNC: NOT DETECTED
MAGNESIUM SERPL-MCNC: 1.8 MG/DL (ref 1.6–2.4)
MAGNESIUM SERPL-MCNC: 2.1 MG/DL (ref 1.6–2.4)
MAXIMAL PREDICTED HEART RATE: 148 BPM
MAXIMAL PREDICTED HEART RATE: 148 BPM
MCH RBC QN AUTO: 28.7 PG (ref 26.6–33)
MCH RBC QN AUTO: 28.7 PG (ref 26.6–33)
MCH RBC QN AUTO: 28.8 PG (ref 26.6–33)
MCH RBC QN AUTO: 29.2 PG (ref 26.6–33)
MCH RBC QN AUTO: 29.3 PG (ref 26.6–33)
MCH RBC QN AUTO: 29.4 PG (ref 26.6–33)
MCH RBC QN AUTO: 29.5 PG (ref 26.6–33)
MCH RBC QN AUTO: 29.6 PG (ref 26.6–33)
MCH RBC QN AUTO: 29.7 PG (ref 26.6–33)
MCH RBC QN AUTO: 29.7 PG (ref 26.6–33)
MCHC RBC AUTO-ENTMCNC: 31.7 G/DL (ref 31.5–35.7)
MCHC RBC AUTO-ENTMCNC: 32.1 G/DL (ref 31.5–35.7)
MCHC RBC AUTO-ENTMCNC: 32.5 G/DL (ref 31.5–35.7)
MCHC RBC AUTO-ENTMCNC: 32.9 G/DL (ref 31.5–35.7)
MCHC RBC AUTO-ENTMCNC: 33.2 G/DL (ref 31.5–35.7)
MCHC RBC AUTO-ENTMCNC: 33.3 G/DL (ref 31.5–35.7)
MCHC RBC AUTO-ENTMCNC: 33.3 G/DL (ref 31.5–35.7)
MCHC RBC AUTO-ENTMCNC: 33.5 G/DL (ref 31.5–35.7)
MCHC RBC AUTO-ENTMCNC: 33.5 G/DL (ref 31.5–35.7)
MCHC RBC AUTO-ENTMCNC: 34 G/DL (ref 31.5–35.7)
MCV RBC AUTO: 86.2 FL (ref 79–97)
MCV RBC AUTO: 86.6 FL (ref 79–97)
MCV RBC AUTO: 87.3 FL (ref 79–97)
MCV RBC AUTO: 87.8 FL (ref 79–97)
MCV RBC AUTO: 88 FL (ref 79–97)
MCV RBC AUTO: 88.5 FL (ref 79–97)
MCV RBC AUTO: 90 FL (ref 79–97)
MCV RBC AUTO: 90.6 FL (ref 79–97)
MCV RBC AUTO: 90.6 FL (ref 79–97)
MCV RBC AUTO: 91.5 FL (ref 79–97)
MODALITY: ABNORMAL
MONOCYTES # BLD AUTO: 0.5 X10E3/UL (ref 0.1–0.9)
MONOCYTES # BLD AUTO: 0.81 10*3/MM3 (ref 0.1–0.9)
MONOCYTES # BLD AUTO: 0.92 10*3/MM3 (ref 0.1–0.9)
MONOCYTES # BLD AUTO: 1.13 10*3/MM3 (ref 0.1–0.9)
MONOCYTES NFR BLD AUTO: 10.6 % (ref 5–12)
MONOCYTES NFR BLD AUTO: 7 %
MONOCYTES NFR BLD AUTO: 8.8 % (ref 5–12)
MONOCYTES NFR BLD AUTO: 9.5 % (ref 5–12)
NEUTROPHILS # BLD AUTO: 4.3 X10E3/UL (ref 1.4–7)
NEUTROPHILS NFR BLD AUTO: 3.96 10*3/MM3 (ref 1.7–7)
NEUTROPHILS NFR BLD AUTO: 51.8 % (ref 42.7–76)
NEUTROPHILS NFR BLD AUTO: 6.66 10*3/MM3 (ref 1.7–7)
NEUTROPHILS NFR BLD AUTO: 63 %
NEUTROPHILS NFR BLD AUTO: 63.8 % (ref 42.7–76)
NEUTROPHILS NFR BLD AUTO: 81.8 % (ref 42.7–76)
NEUTROPHILS NFR BLD AUTO: 9.69 10*3/MM3 (ref 1.7–7)
NITRITE UR QL STRIP: NEGATIVE
NRBC BLD AUTO-RTO: 0 /100 WBC (ref 0–0.2)
NRBC BLD AUTO-RTO: 0 /100 WBC (ref 0–0.2)
NRBC BLD AUTO-RTO: 0.1 /100 WBC (ref 0–0.2)
NT-PROBNP SERPL-MCNC: 388 PG/ML (ref 0–900)
PATH REPORT.FINAL DX SPEC: NORMAL
PATH REPORT.GROSS SPEC: NORMAL
PCO2 BLDA: 56.4 MM HG (ref 35–45)
PH BLDA: 7.35 PH UNITS (ref 7.35–7.45)
PH UR STRIP.AUTO: 5.5 [PH] (ref 5–8)
PHOSPHATE SERPL-MCNC: 2.5 MG/DL (ref 2.5–4.5)
PLATELET # BLD AUTO: 197 10*3/MM3 (ref 140–450)
PLATELET # BLD AUTO: 238 10*3/MM3 (ref 140–450)
PLATELET # BLD AUTO: 251 10*3/MM3 (ref 140–450)
PLATELET # BLD AUTO: 257 10*3/MM3 (ref 140–450)
PLATELET # BLD AUTO: 262 10*3/MM3 (ref 140–450)
PLATELET # BLD AUTO: 264 10*3/MM3 (ref 140–450)
PLATELET # BLD AUTO: 282 10*3/MM3 (ref 140–450)
PLATELET # BLD AUTO: 319 10*3/MM3 (ref 140–450)
PLATELET # BLD AUTO: 320 X10E3/UL (ref 150–450)
PLATELET # BLD AUTO: 411 10*3/MM3 (ref 140–450)
PMV BLD AUTO: 10.1 FL (ref 6–12)
PMV BLD AUTO: 10.1 FL (ref 6–12)
PMV BLD AUTO: 10.3 FL (ref 6–12)
PMV BLD AUTO: 10.4 FL (ref 6–12)
PMV BLD AUTO: 10.5 FL (ref 6–12)
PMV BLD AUTO: 10.7 FL (ref 6–12)
PMV BLD AUTO: 10.8 FL (ref 6–12)
PMV BLD AUTO: 11.1 FL (ref 6–12)
PMV BLD AUTO: 11.5 FL (ref 6–12)
PO2 BLDA: 67.3 MM HG (ref 80–100)
POTASSIUM SERPL-SCNC: 3.7 MMOL/L (ref 3.5–5.2)
POTASSIUM SERPL-SCNC: 3.8 MMOL/L (ref 3.5–5.2)
POTASSIUM SERPL-SCNC: 3.8 MMOL/L (ref 3.5–5.2)
POTASSIUM SERPL-SCNC: 3.9 MMOL/L (ref 3.5–5.2)
POTASSIUM SERPL-SCNC: 3.9 MMOL/L (ref 3.5–5.2)
POTASSIUM SERPL-SCNC: 4 MMOL/L (ref 3.5–5.2)
POTASSIUM SERPL-SCNC: 4 MMOL/L (ref 3.5–5.2)
POTASSIUM SERPL-SCNC: 4.1 MMOL/L (ref 3.5–5.2)
POTASSIUM SERPL-SCNC: 4.2 MMOL/L (ref 3.5–5.2)
POTASSIUM SERPL-SCNC: 4.2 MMOL/L (ref 3.5–5.2)
PROCALCITONIN SERPL-MCNC: 0.03 NG/ML (ref 0–0.25)
PROT SERPL-MCNC: 5.6 G/DL (ref 6–8.5)
PROT SERPL-MCNC: 5.7 G/DL (ref 6–8.5)
PROT SERPL-MCNC: 5.7 G/DL (ref 6–8.5)
PROT SERPL-MCNC: 5.8 G/DL (ref 6–8.5)
PROT SERPL-MCNC: 5.8 G/DL (ref 6–8.5)
PROT SERPL-MCNC: 6.5 G/DL (ref 6–8.5)
PROT SERPL-MCNC: 6.7 G/DL (ref 6–8.5)
PROT SERPL-MCNC: 6.8 G/DL (ref 6–8.5)
PROT SERPL-MCNC: 7.5 G/DL (ref 6–8.5)
PROT UR QL STRIP: ABNORMAL
PROTHROMBIN TIME: 12.7 SECONDS (ref 11.7–14.2)
PROTHROMBIN TIME: 13.2 SECONDS (ref 11.7–14.2)
QT INTERVAL: 385 MS
QT INTERVAL: 396 MS
RBC # BLD AUTO: 3.73 10*6/MM3 (ref 3.77–5.28)
RBC # BLD AUTO: 3.81 10*6/MM3 (ref 3.77–5.28)
RBC # BLD AUTO: 3.83 10*6/MM3 (ref 3.77–5.28)
RBC # BLD AUTO: 3.89 10*6/MM3 (ref 3.77–5.28)
RBC # BLD AUTO: 3.96 10*6/MM3 (ref 3.77–5.28)
RBC # BLD AUTO: 4.08 10*6/MM3 (ref 3.77–5.28)
RBC # BLD AUTO: 4.26 10*6/MM3 (ref 3.77–5.28)
RBC # BLD AUTO: 4.42 10*6/MM3 (ref 3.77–5.28)
RBC # BLD AUTO: 4.46 10*6/MM3 (ref 3.77–5.28)
RBC # BLD AUTO: 4.55 X10E6/UL (ref 3.77–5.28)
RBC # UR STRIP: ABNORMAL /HPF
REF LAB TEST METHOD: ABNORMAL
RHINOVIRUS RNA SPEC NAA+PROBE: NOT DETECTED
RSV RNA NPH QL NAA+NON-PROBE: NOT DETECTED
SAO2 % BLDCOA: 91.6 % (ref 92–99)
SARS-COV-2 RNA NPH QL NAA+NON-PROBE: NOT DETECTED
SARS-COV-2 RNA RESP QL NAA+PROBE: NOT DETECTED
SARS-COV-2 RNA RESP QL NAA+PROBE: NOT DETECTED
SET MECH RESP RATE: 32
SINUS: 2.7 CM
SINUS: 3.2 CM
SODIUM SERPL-SCNC: 137 MMOL/L (ref 136–145)
SODIUM SERPL-SCNC: 137 MMOL/L (ref 136–145)
SODIUM SERPL-SCNC: 138 MMOL/L (ref 136–145)
SODIUM SERPL-SCNC: 138 MMOL/L (ref 136–145)
SODIUM SERPL-SCNC: 139 MMOL/L (ref 136–145)
SODIUM SERPL-SCNC: 139 MMOL/L (ref 136–145)
SODIUM SERPL-SCNC: 140 MMOL/L (ref 134–144)
SODIUM SERPL-SCNC: 140 MMOL/L (ref 136–145)
SODIUM SERPL-SCNC: 141 MMOL/L (ref 136–145)
SODIUM SERPL-SCNC: 141 MMOL/L (ref 136–145)
SP GR UR STRIP: 1.02 (ref 1–1.03)
SQUAMOUS #/AREA URNS HPF: ABNORMAL /HPF
STJ: 2.47 CM
STJ: 2.8 CM
STRESS TARGET HR: 126 BPM
STRESS TARGET HR: 126 BPM
TOTAL RATE: 32 BREATHS/MINUTE
TRIGL SERPL-MCNC: 54 MG/DL (ref 0–150)
TROPONIN T SERPL-MCNC: <0.01 NG/ML (ref 0–0.03)
TROPONIN T SERPL-MCNC: <0.01 NG/ML (ref 0–0.03)
UROBILINOGEN UR QL STRIP: ABNORMAL
VLDLC SERPL-MCNC: 10 MG/DL (ref 5–40)
WBC # BLD AUTO: 7 X10E3/UL (ref 3.4–10.8)
WBC # UR STRIP: ABNORMAL /HPF
WBC NRBC COR # BLD: 10.4 10*3/MM3 (ref 3.4–10.8)
WBC NRBC COR # BLD: 10.43 10*3/MM3 (ref 3.4–10.8)
WBC NRBC COR # BLD: 11.86 10*3/MM3 (ref 3.4–10.8)
WBC NRBC COR # BLD: 7.64 10*3/MM3 (ref 3.4–10.8)
WBC NRBC COR # BLD: 7.77 10*3/MM3 (ref 3.4–10.8)
WBC NRBC COR # BLD: 7.78 10*3/MM3 (ref 3.4–10.8)
WBC NRBC COR # BLD: 7.79 10*3/MM3 (ref 3.4–10.8)
WBC NRBC COR # BLD: 9.15 10*3/MM3 (ref 3.4–10.8)
WBC NRBC COR # BLD: 9.87 10*3/MM3 (ref 3.4–10.8)
WHOLE BLOOD HOLD COAG: NORMAL
WHOLE BLOOD HOLD SPECIMEN: NORMAL

## 2022-01-01 PROCEDURE — 99214 OFFICE O/P EST MOD 30 MIN: CPT | Performed by: NEUROLOGICAL SURGERY

## 2022-01-01 PROCEDURE — 93306 TTE W/DOPPLER COMPLETE: CPT | Performed by: INTERNAL MEDICINE

## 2022-01-01 PROCEDURE — 85027 COMPLETE CBC AUTOMATED: CPT | Performed by: INTERNAL MEDICINE

## 2022-01-01 PROCEDURE — 70498 CT ANGIOGRAPHY NECK: CPT

## 2022-01-01 PROCEDURE — 84484 ASSAY OF TROPONIN QUANT: CPT

## 2022-01-01 PROCEDURE — 82962 GLUCOSE BLOOD TEST: CPT

## 2022-01-01 PROCEDURE — 83880 ASSAY OF NATRIURETIC PEPTIDE: CPT | Performed by: EMERGENCY MEDICINE

## 2022-01-01 PROCEDURE — 99284 EMERGENCY DEPT VISIT MOD MDM: CPT

## 2022-01-01 PROCEDURE — 92526 ORAL FUNCTION THERAPY: CPT

## 2022-01-01 PROCEDURE — 93306 TTE W/DOPPLER COMPLETE: CPT

## 2022-01-01 PROCEDURE — 80053 COMPREHEN METABOLIC PANEL: CPT | Performed by: INTERNAL MEDICINE

## 2022-01-01 PROCEDURE — 84484 ASSAY OF TROPONIN QUANT: CPT | Performed by: EMERGENCY MEDICINE

## 2022-01-01 PROCEDURE — 71045 X-RAY EXAM CHEST 1 VIEW: CPT

## 2022-01-01 PROCEDURE — 99232 SBSQ HOSP IP/OBS MODERATE 35: CPT | Performed by: NEUROLOGICAL SURGERY

## 2022-01-01 PROCEDURE — 92610 EVALUATE SWALLOWING FUNCTION: CPT

## 2022-01-01 PROCEDURE — 25010000002 IOPAMIDOL 61 % SOLUTION: Performed by: INTERNAL MEDICINE

## 2022-01-01 PROCEDURE — 99232 SBSQ HOSP IP/OBS MODERATE 35: CPT | Performed by: NURSE PRACTITIONER

## 2022-01-01 PROCEDURE — G0378 HOSPITAL OBSERVATION PER HR: HCPCS

## 2022-01-01 PROCEDURE — 82803 BLOOD GASES ANY COMBINATION: CPT

## 2022-01-01 PROCEDURE — 70450 CT HEAD/BRAIN W/O DYE: CPT

## 2022-01-01 PROCEDURE — 25010000002 LEVETRIRACETAM PER 10 MG: Performed by: INTERNAL MEDICINE

## 2022-01-01 PROCEDURE — 0 IOPAMIDOL PER 1 ML: Performed by: INTERNAL MEDICINE

## 2022-01-01 PROCEDURE — 97535 SELF CARE MNGMENT TRAINING: CPT

## 2022-01-01 PROCEDURE — 74018 RADEX ABDOMEN 1 VIEW: CPT

## 2022-01-01 PROCEDURE — G0008 ADMIN INFLUENZA VIRUS VAC: HCPCS | Performed by: INTERNAL MEDICINE

## 2022-01-01 PROCEDURE — 25010000002 PIPERACILLIN SOD-TAZOBACTAM PER 1 G: Performed by: INTERNAL MEDICINE

## 2022-01-01 PROCEDURE — 97530 THERAPEUTIC ACTIVITIES: CPT

## 2022-01-01 PROCEDURE — 99214 OFFICE O/P EST MOD 30 MIN: CPT | Performed by: INTERNAL MEDICINE

## 2022-01-01 PROCEDURE — 0 LIDOCAINE 1 % SOLUTION: Performed by: RADIOLOGY

## 2022-01-01 PROCEDURE — A9577 INJ MULTIHANCE: HCPCS | Performed by: INTERNAL MEDICINE

## 2022-01-01 PROCEDURE — 85025 COMPLETE CBC W/AUTO DIFF WBC: CPT | Performed by: EMERGENCY MEDICINE

## 2022-01-01 PROCEDURE — 83735 ASSAY OF MAGNESIUM: CPT | Performed by: INTERNAL MEDICINE

## 2022-01-01 PROCEDURE — 99495 TRANSJ CARE MGMT MOD F2F 14D: CPT | Performed by: INTERNAL MEDICINE

## 2022-01-01 PROCEDURE — 93010 ELECTROCARDIOGRAM REPORT: CPT | Performed by: INTERNAL MEDICINE

## 2022-01-01 PROCEDURE — 25010000002 PERFLUTREN (DEFINITY) 8.476 MG IN SODIUM CHLORIDE (PF) 0.9 % 10 ML INJECTION: Performed by: INTERNAL MEDICINE

## 2022-01-01 PROCEDURE — 1160F RVW MEDS BY RX/DR IN RCRD: CPT | Performed by: INTERNAL MEDICINE

## 2022-01-01 PROCEDURE — 1170F FXNL STATUS ASSESSED: CPT | Performed by: INTERNAL MEDICINE

## 2022-01-01 PROCEDURE — 25010000002 LEVETRIRACETAM PER 10 MG: Performed by: EMERGENCY MEDICINE

## 2022-01-01 PROCEDURE — 25010000002 HYDROMORPHONE 1 MG/ML SOLUTION: Performed by: INTERNAL MEDICINE

## 2022-01-01 PROCEDURE — 85025 COMPLETE CBC W/AUTO DIFF WBC: CPT

## 2022-01-01 PROCEDURE — 80053 COMPREHEN METABOLIC PANEL: CPT

## 2022-01-01 PROCEDURE — 85610 PROTHROMBIN TIME: CPT | Performed by: EMERGENCY MEDICINE

## 2022-01-01 PROCEDURE — 93005 ELECTROCARDIOGRAM TRACING: CPT | Performed by: EMERGENCY MEDICINE

## 2022-01-01 PROCEDURE — 99222 1ST HOSP IP/OBS MODERATE 55: CPT | Performed by: NURSE PRACTITIONER

## 2022-01-01 PROCEDURE — 36600 WITHDRAWAL OF ARTERIAL BLOOD: CPT

## 2022-01-01 PROCEDURE — 84100 ASSAY OF PHOSPHORUS: CPT | Performed by: INTERNAL MEDICINE

## 2022-01-01 PROCEDURE — 0202U NFCT DS 22 TRGT SARS-COV-2: CPT | Performed by: EMERGENCY MEDICINE

## 2022-01-01 PROCEDURE — 97112 NEUROMUSCULAR REEDUCATION: CPT

## 2022-01-01 PROCEDURE — 74177 CT ABD & PELVIS W/CONTRAST: CPT

## 2022-01-01 PROCEDURE — 0 GADOBENATE DIMEGLUMINE 529 MG/ML SOLUTION: Performed by: INTERNAL MEDICINE

## 2022-01-01 PROCEDURE — 71260 CT THORAX DX C+: CPT

## 2022-01-01 PROCEDURE — 80061 LIPID PANEL: CPT | Performed by: INTERNAL MEDICINE

## 2022-01-01 PROCEDURE — 25010000002 ONDANSETRON PER 1 MG: Performed by: INTERNAL MEDICINE

## 2022-01-01 PROCEDURE — 83036 HEMOGLOBIN GLYCOSYLATED A1C: CPT | Performed by: INTERNAL MEDICINE

## 2022-01-01 PROCEDURE — 90662 IIV NO PRSV INCREASED AG IM: CPT | Performed by: INTERNAL MEDICINE

## 2022-01-01 PROCEDURE — 93005 ELECTROCARDIOGRAM TRACING: CPT

## 2022-01-01 PROCEDURE — 84145 PROCALCITONIN (PCT): CPT | Performed by: INTERNAL MEDICINE

## 2022-01-01 PROCEDURE — 81001 URINALYSIS AUTO W/SCOPE: CPT

## 2022-01-01 PROCEDURE — 99213 OFFICE O/P EST LOW 20 MIN: CPT | Performed by: NURSE PRACTITIONER

## 2022-01-01 PROCEDURE — G0439 PPPS, SUBSEQ VISIT: HCPCS | Performed by: INTERNAL MEDICINE

## 2022-01-01 PROCEDURE — 99285 EMERGENCY DEPT VISIT HI MDM: CPT

## 2022-01-01 PROCEDURE — 36415 COLL VENOUS BLD VENIPUNCTURE: CPT

## 2022-01-01 PROCEDURE — 25010000002 LORAZEPAM PER 2 MG: Performed by: INTERNAL MEDICINE

## 2022-01-01 PROCEDURE — 83735 ASSAY OF MAGNESIUM: CPT

## 2022-01-01 PROCEDURE — 80053 COMPREHEN METABOLIC PANEL: CPT | Performed by: EMERGENCY MEDICINE

## 2022-01-01 PROCEDURE — 85027 COMPLETE CBC AUTOMATED: CPT | Performed by: HOSPITALIST

## 2022-01-01 PROCEDURE — 97162 PT EVAL MOD COMPLEX 30 MIN: CPT

## 2022-01-01 PROCEDURE — 88173 CYTOPATH EVAL FNA REPORT: CPT | Performed by: INTERNAL MEDICINE

## 2022-01-01 PROCEDURE — 85730 THROMBOPLASTIN TIME PARTIAL: CPT | Performed by: EMERGENCY MEDICINE

## 2022-01-01 PROCEDURE — 97166 OT EVAL MOD COMPLEX 45 MIN: CPT

## 2022-01-01 PROCEDURE — 70496 CT ANGIOGRAPHY HEAD: CPT

## 2022-01-01 PROCEDURE — 99213 OFFICE O/P EST LOW 20 MIN: CPT | Performed by: INTERNAL MEDICINE

## 2022-01-01 PROCEDURE — U0005 INFEC AGEN DETEC AMPLI PROBE: HCPCS | Performed by: EMERGENCY MEDICINE

## 2022-01-01 PROCEDURE — U0003 INFECTIOUS AGENT DETECTION BY NUCLEIC ACID (DNA OR RNA); SEVERE ACUTE RESPIRATORY SYNDROME CORONAVIRUS 2 (SARS-COV-2) (CORONAVIRUS DISEASE [COVID-19]), AMPLIFIED PROBE TECHNIQUE, MAKING USE OF HIGH THROUGHPUT TECHNOLOGIES AS DESCRIBED BY CMS-2020-01-R: HCPCS | Performed by: EMERGENCY MEDICINE

## 2022-01-01 PROCEDURE — 76536 US EXAM OF HEAD AND NECK: CPT

## 2022-01-01 PROCEDURE — 88305 TISSUE EXAM BY PATHOLOGIST: CPT | Performed by: INTERNAL MEDICINE

## 2022-01-01 PROCEDURE — 1111F DSCHRG MED/CURRENT MED MERGE: CPT | Performed by: INTERNAL MEDICINE

## 2022-01-01 PROCEDURE — 80048 BASIC METABOLIC PNL TOTAL CA: CPT | Performed by: HOSPITALIST

## 2022-01-01 PROCEDURE — 97110 THERAPEUTIC EXERCISES: CPT

## 2022-01-01 PROCEDURE — 70553 MRI BRAIN STEM W/O & W/DYE: CPT

## 2022-01-01 PROCEDURE — 72125 CT NECK SPINE W/O DYE: CPT

## 2022-01-01 PROCEDURE — 94799 UNLISTED PULMONARY SVC/PX: CPT

## 2022-01-01 PROCEDURE — 25010000002 HYDROMORPHONE PER 4 MG: Performed by: INTERNAL MEDICINE

## 2022-01-01 PROCEDURE — 99231 SBSQ HOSP IP/OBS SF/LOW 25: CPT | Performed by: NURSE PRACTITIONER

## 2022-01-01 PROCEDURE — 73560 X-RAY EXAM OF KNEE 1 OR 2: CPT

## 2022-01-01 RX ORDER — ACETAMINOPHEN 325 MG/1
650 TABLET ORAL EVERY 4 HOURS PRN
Status: DISCONTINUED | OUTPATIENT
Start: 2022-01-01 | End: 2022-01-01

## 2022-01-01 RX ORDER — ALUMINA, MAGNESIA, AND SIMETHICONE 2400; 2400; 240 MG/30ML; MG/30ML; MG/30ML
7.5 SUSPENSION ORAL EVERY 4 HOURS PRN
Status: DISCONTINUED | OUTPATIENT
Start: 2022-01-01 | End: 2022-12-21 | Stop reason: HOSPADM

## 2022-01-01 RX ORDER — LIDOCAINE 50 MG/G
1 PATCH TOPICAL ONCE
Status: DISCONTINUED | OUTPATIENT
Start: 2022-01-01 | End: 2022-01-01 | Stop reason: HOSPADM

## 2022-01-01 RX ORDER — SODIUM CHLORIDE 0.9 % (FLUSH) 0.9 %
10 SYRINGE (ML) INJECTION AS NEEDED
Status: DISCONTINUED | OUTPATIENT
Start: 2022-01-01 | End: 2022-01-01

## 2022-01-01 RX ORDER — BUSPIRONE HYDROCHLORIDE 10 MG/1
10 TABLET ORAL 2 TIMES DAILY
Status: DISCONTINUED | OUTPATIENT
Start: 2022-01-01 | End: 2022-01-01

## 2022-01-01 RX ORDER — MORPHINE SULFATE 20 MG/ML
20 SOLUTION ORAL
Status: DISCONTINUED | OUTPATIENT
Start: 2022-01-01 | End: 2022-12-21 | Stop reason: HOSPADM

## 2022-01-01 RX ORDER — GLYCOPYRROLATE 0.2 MG/ML
0.2 INJECTION INTRAMUSCULAR; INTRAVENOUS
Status: DISCONTINUED | OUTPATIENT
Start: 2022-01-01 | End: 2022-12-21 | Stop reason: HOSPADM

## 2022-01-01 RX ORDER — HYDRALAZINE HYDROCHLORIDE 50 MG/1
50 TABLET, FILM COATED ORAL DAILY
Status: DISCONTINUED | OUTPATIENT
Start: 2022-01-01 | End: 2022-01-01 | Stop reason: HOSPADM

## 2022-01-01 RX ORDER — MORPHINE SULFATE 2 MG/ML
2 INJECTION, SOLUTION INTRAMUSCULAR; INTRAVENOUS
Status: DISCONTINUED | OUTPATIENT
Start: 2022-01-01 | End: 2022-12-21 | Stop reason: HOSPADM

## 2022-01-01 RX ORDER — SODIUM CHLORIDE 9 MG/ML
75 INJECTION, SOLUTION INTRAVENOUS CONTINUOUS
Status: DISCONTINUED | OUTPATIENT
Start: 2022-01-01 | End: 2022-01-01

## 2022-01-01 RX ORDER — ACETAMINOPHEN 650 MG/1
650 SUPPOSITORY RECTAL EVERY 4 HOURS PRN
Status: DISCONTINUED | OUTPATIENT
Start: 2022-01-01 | End: 2022-12-21 | Stop reason: HOSPADM

## 2022-01-01 RX ORDER — SODIUM CHLORIDE 0.9 % (FLUSH) 0.9 %
10 SYRINGE (ML) INJECTION EVERY 12 HOURS SCHEDULED
Status: DISCONTINUED | OUTPATIENT
Start: 2022-01-01 | End: 2022-01-01

## 2022-01-01 RX ORDER — LOSARTAN POTASSIUM 100 MG/1
100 TABLET ORAL DAILY
Status: DISCONTINUED | OUTPATIENT
Start: 2022-01-01 | End: 2022-01-01

## 2022-01-01 RX ORDER — ACETAMINOPHEN 325 MG/1
650 TABLET ORAL EVERY 4 HOURS PRN
Status: DISCONTINUED | OUTPATIENT
Start: 2022-01-01 | End: 2022-01-01 | Stop reason: HOSPADM

## 2022-01-01 RX ORDER — LEVETIRACETAM 500 MG/5ML
1000 INJECTION, SOLUTION, CONCENTRATE INTRAVENOUS EVERY 12 HOURS SCHEDULED
Status: DISCONTINUED | OUTPATIENT
Start: 2022-01-01 | End: 2022-01-01

## 2022-01-01 RX ORDER — LORAZEPAM 2 MG/ML
2 INJECTION INTRAMUSCULAR
Status: DISCONTINUED | OUTPATIENT
Start: 2022-01-01 | End: 2022-12-21 | Stop reason: HOSPADM

## 2022-01-01 RX ORDER — MORPHINE SULFATE 10 MG/ML
6 INJECTION INTRAMUSCULAR; INTRAVENOUS; SUBCUTANEOUS
Status: DISCONTINUED | OUTPATIENT
Start: 2022-01-01 | End: 2022-12-21 | Stop reason: HOSPADM

## 2022-01-01 RX ORDER — ACETAMINOPHEN 160 MG/5ML
650 SOLUTION ORAL EVERY 4 HOURS PRN
Status: DISCONTINUED | OUTPATIENT
Start: 2022-01-01 | End: 2022-01-01 | Stop reason: HOSPADM

## 2022-01-01 RX ORDER — POTASSIUM CHLORIDE 7.45 MG/ML
10 INJECTION INTRAVENOUS
Status: DISCONTINUED | OUTPATIENT
Start: 2022-01-01 | End: 2022-12-21 | Stop reason: HOSPADM

## 2022-01-01 RX ORDER — LORAZEPAM 2 MG/ML
1 CONCENTRATE ORAL
Status: DISCONTINUED | OUTPATIENT
Start: 2022-01-01 | End: 2022-12-21 | Stop reason: HOSPADM

## 2022-01-01 RX ORDER — HYDRALAZINE HYDROCHLORIDE 50 MG/1
TABLET, FILM COATED ORAL
Qty: 180 TABLET | Refills: 1 | Status: SHIPPED | OUTPATIENT
Start: 2022-01-01

## 2022-01-01 RX ORDER — VANCOMYCIN HYDROCHLORIDE 125 MG/1
125 CAPSULE ORAL 4 TIMES DAILY
Qty: 40 CAPSULE | Refills: 0 | Status: SHIPPED | OUTPATIENT
Start: 2022-01-01 | End: 2022-01-01

## 2022-01-01 RX ORDER — BISACODYL 10 MG
10 SUPPOSITORY, RECTAL RECTAL DAILY PRN
Status: DISCONTINUED | OUTPATIENT
Start: 2022-01-01 | End: 2022-01-01 | Stop reason: SDUPTHER

## 2022-01-01 RX ORDER — LABETALOL HYDROCHLORIDE 5 MG/ML
20 INJECTION, SOLUTION INTRAVENOUS
Status: DISCONTINUED | OUTPATIENT
Start: 2022-01-01 | End: 2022-01-01

## 2022-01-01 RX ORDER — BISACODYL 5 MG/1
5 TABLET, DELAYED RELEASE ORAL DAILY PRN
Status: DISCONTINUED | OUTPATIENT
Start: 2022-01-01 | End: 2022-01-01

## 2022-01-01 RX ORDER — SCOLOPAMINE TRANSDERMAL SYSTEM 1 MG/1
1 PATCH, EXTENDED RELEASE TRANSDERMAL
Status: DISCONTINUED | OUTPATIENT
Start: 2022-01-01 | End: 2022-12-21 | Stop reason: HOSPADM

## 2022-01-01 RX ORDER — DIPHENOXYLATE HYDROCHLORIDE AND ATROPINE SULFATE 2.5; .025 MG/1; MG/1
1 TABLET ORAL
Status: DISCONTINUED | OUTPATIENT
Start: 2022-01-01 | End: 2022-12-21 | Stop reason: HOSPADM

## 2022-01-01 RX ORDER — SODIUM CHLORIDE 0.9 % (FLUSH) 0.9 %
10 SYRINGE (ML) INJECTION EVERY 12 HOURS SCHEDULED
Status: DISCONTINUED | OUTPATIENT
Start: 2022-01-01 | End: 2022-01-01 | Stop reason: HOSPADM

## 2022-01-01 RX ORDER — POLYETHYLENE GLYCOL 3350 17 G/17G
17 POWDER, FOR SOLUTION ORAL DAILY PRN
Status: DISCONTINUED | OUTPATIENT
Start: 2022-01-01 | End: 2022-01-01

## 2022-01-01 RX ORDER — ACETAMINOPHEN 650 MG/1
650 SUPPOSITORY RECTAL EVERY 4 HOURS PRN
Status: DISCONTINUED | OUTPATIENT
Start: 2022-01-01 | End: 2022-01-01 | Stop reason: HOSPADM

## 2022-01-01 RX ORDER — LOSARTAN POTASSIUM 100 MG/1
100 TABLET ORAL DAILY
Qty: 90 TABLET | Refills: 1 | Status: SHIPPED | OUTPATIENT
Start: 2022-01-01

## 2022-01-01 RX ORDER — VENLAFAXINE 75 MG/1
37.5 TABLET ORAL 2 TIMES DAILY
Status: DISCONTINUED | OUTPATIENT
Start: 2022-01-01 | End: 2022-01-01

## 2022-01-01 RX ORDER — MAGNESIUM SULFATE HEPTAHYDRATE 40 MG/ML
2 INJECTION, SOLUTION INTRAVENOUS AS NEEDED
Status: DISCONTINUED | OUTPATIENT
Start: 2022-01-01 | End: 2022-01-01

## 2022-01-01 RX ORDER — MORPHINE SULFATE 2 MG/ML
4 INJECTION, SOLUTION INTRAMUSCULAR; INTRAVENOUS
Status: DISCONTINUED | OUTPATIENT
Start: 2022-01-01 | End: 2022-12-21 | Stop reason: HOSPADM

## 2022-01-01 RX ORDER — SODIUM CHLORIDE 0.9 % (FLUSH) 0.9 %
10 SYRINGE (ML) INJECTION AS NEEDED
Status: DISCONTINUED | OUTPATIENT
Start: 2022-01-01 | End: 2022-01-01 | Stop reason: HOSPADM

## 2022-01-01 RX ORDER — LIDOCAINE HYDROCHLORIDE 10 MG/ML
10 INJECTION, SOLUTION INFILTRATION; PERINEURAL ONCE
Status: COMPLETED | OUTPATIENT
Start: 2022-01-01 | End: 2022-01-01

## 2022-01-01 RX ORDER — ONDANSETRON 2 MG/ML
4 INJECTION INTRAMUSCULAR; INTRAVENOUS EVERY 6 HOURS PRN
Status: DISCONTINUED | OUTPATIENT
Start: 2022-01-01 | End: 2022-01-01 | Stop reason: HOSPADM

## 2022-01-01 RX ORDER — LABETALOL HYDROCHLORIDE 5 MG/ML
20 INJECTION, SOLUTION INTRAVENOUS EVERY 6 HOURS PRN
Status: DISCONTINUED | OUTPATIENT
Start: 2022-01-01 | End: 2022-12-21 | Stop reason: HOSPADM

## 2022-01-01 RX ORDER — CALCIUM CARBONATE 200(500)MG
2 TABLET,CHEWABLE ORAL 2 TIMES DAILY PRN
Status: DISCONTINUED | OUTPATIENT
Start: 2022-01-01 | End: 2022-01-01 | Stop reason: HOSPADM

## 2022-01-01 RX ORDER — MORPHINE SULFATE 20 MG/ML
10 SOLUTION ORAL
Status: DISCONTINUED | OUTPATIENT
Start: 2022-01-01 | End: 2022-12-21 | Stop reason: HOSPADM

## 2022-01-01 RX ORDER — AMOXICILLIN 250 MG
2 CAPSULE ORAL 2 TIMES DAILY
Status: DISCONTINUED | OUTPATIENT
Start: 2022-01-01 | End: 2022-01-01

## 2022-01-01 RX ORDER — LORAZEPAM 2 MG/ML
1 INJECTION INTRAMUSCULAR
Status: DISCONTINUED | OUTPATIENT
Start: 2022-01-01 | End: 2022-12-21 | Stop reason: HOSPADM

## 2022-01-01 RX ORDER — MORPHINE SULFATE 20 MG/ML
5 SOLUTION ORAL
Status: DISCONTINUED | OUTPATIENT
Start: 2022-01-01 | End: 2022-12-21 | Stop reason: HOSPADM

## 2022-01-01 RX ORDER — POTASSIUM CHLORIDE 750 MG/1
40 TABLET, FILM COATED, EXTENDED RELEASE ORAL AS NEEDED
Status: DISCONTINUED | OUTPATIENT
Start: 2022-01-01 | End: 2022-12-21 | Stop reason: HOSPADM

## 2022-01-01 RX ORDER — GLYCOPYRROLATE 0.2 MG/ML
0.4 INJECTION INTRAMUSCULAR; INTRAVENOUS
Status: DISCONTINUED | OUTPATIENT
Start: 2022-01-01 | End: 2022-12-21 | Stop reason: HOSPADM

## 2022-01-01 RX ORDER — SODIUM CHLORIDE 9 MG/ML
40 INJECTION, SOLUTION INTRAVENOUS AS NEEDED
Status: DISCONTINUED | OUTPATIENT
Start: 2022-01-01 | End: 2022-12-21 | Stop reason: HOSPADM

## 2022-01-01 RX ORDER — ONDANSETRON 2 MG/ML
4 INJECTION INTRAMUSCULAR; INTRAVENOUS EVERY 6 HOURS PRN
Status: DISCONTINUED | OUTPATIENT
Start: 2022-01-01 | End: 2022-01-01 | Stop reason: SDUPTHER

## 2022-01-01 RX ORDER — LORAZEPAM 2 MG/ML
0.5 CONCENTRATE ORAL
Status: DISCONTINUED | OUTPATIENT
Start: 2022-01-01 | End: 2022-12-21 | Stop reason: HOSPADM

## 2022-01-01 RX ORDER — LORAZEPAM 2 MG/ML
0.5 INJECTION INTRAMUSCULAR
Status: DISCONTINUED | OUTPATIENT
Start: 2022-01-01 | End: 2022-12-21 | Stop reason: HOSPADM

## 2022-01-01 RX ORDER — HYDROMORPHONE HYDROCHLORIDE 1 MG/ML
0.5 INJECTION, SOLUTION INTRAMUSCULAR; INTRAVENOUS; SUBCUTANEOUS
Status: DISCONTINUED | OUTPATIENT
Start: 2022-01-01 | End: 2022-12-21 | Stop reason: HOSPADM

## 2022-01-01 RX ORDER — VANCOMYCIN HYDROCHLORIDE 125 MG/1
125 CAPSULE ORAL 4 TIMES DAILY
Status: DISCONTINUED | OUTPATIENT
Start: 2022-01-01 | End: 2022-01-01 | Stop reason: HOSPADM

## 2022-01-01 RX ORDER — LEVETIRACETAM 500 MG/5ML
500 INJECTION, SOLUTION, CONCENTRATE INTRAVENOUS EVERY 12 HOURS SCHEDULED
Status: COMPLETED | OUTPATIENT
Start: 2022-01-01 | End: 2022-01-01

## 2022-01-01 RX ORDER — FENTANYL CITRATE 50 UG/ML
25 INJECTION, SOLUTION INTRAMUSCULAR; INTRAVENOUS
Status: DISCONTINUED | OUTPATIENT
Start: 2022-01-01 | End: 2022-01-01

## 2022-01-01 RX ORDER — CALCIUM GLUCONATE 20 MG/ML
1 INJECTION, SOLUTION INTRAVENOUS AS NEEDED
Status: DISCONTINUED | OUTPATIENT
Start: 2022-01-01 | End: 2022-01-01

## 2022-01-01 RX ORDER — HYDROCODONE BITARTRATE AND ACETAMINOPHEN 5; 325 MG/1; MG/1
1 TABLET ORAL EVERY 4 HOURS PRN
Status: DISCONTINUED | OUTPATIENT
Start: 2022-01-01 | End: 2022-01-01

## 2022-01-01 RX ORDER — LOSARTAN POTASSIUM 100 MG/1
100 TABLET ORAL DAILY
Qty: 90 TABLET | Refills: 1 | Status: SHIPPED | OUTPATIENT
Start: 2022-01-01 | End: 2022-01-01

## 2022-01-01 RX ORDER — POTASSIUM CHLORIDE 1.5 G/1.77G
40 POWDER, FOR SOLUTION ORAL AS NEEDED
Status: DISCONTINUED | OUTPATIENT
Start: 2022-01-01 | End: 2022-12-21 | Stop reason: HOSPADM

## 2022-01-01 RX ORDER — DOCUSATE SODIUM 100 MG/1
100 CAPSULE, LIQUID FILLED ORAL DAILY
Status: DISCONTINUED | OUTPATIENT
Start: 2022-01-01 | End: 2022-01-01

## 2022-01-01 RX ORDER — HYDROMORPHONE HCL 110MG/55ML
1.5 PATIENT CONTROLLED ANALGESIA SYRINGE INTRAVENOUS
Status: DISCONTINUED | OUTPATIENT
Start: 2022-01-01 | End: 2022-12-21 | Stop reason: HOSPADM

## 2022-01-01 RX ORDER — MAGNESIUM SULFATE HEPTAHYDRATE 40 MG/ML
4 INJECTION, SOLUTION INTRAVENOUS AS NEEDED
Status: DISCONTINUED | OUTPATIENT
Start: 2022-01-01 | End: 2022-01-01

## 2022-01-01 RX ORDER — DESVENLAFAXINE SUCCINATE 50 MG/1
50 TABLET, EXTENDED RELEASE ORAL EVERY MORNING
Status: DISCONTINUED | OUTPATIENT
Start: 2022-01-01 | End: 2022-01-01

## 2022-01-01 RX ORDER — ACETAMINOPHEN 160 MG/5ML
650 SOLUTION ORAL EVERY 4 HOURS PRN
Status: DISCONTINUED | OUTPATIENT
Start: 2022-01-01 | End: 2022-12-21 | Stop reason: HOSPADM

## 2022-01-01 RX ORDER — ACETAMINOPHEN 650 MG/1
650 SUPPOSITORY RECTAL EVERY 4 HOURS PRN
Status: DISCONTINUED | OUTPATIENT
Start: 2022-01-01 | End: 2022-01-01

## 2022-01-01 RX ORDER — ONDANSETRON 2 MG/ML
4 INJECTION INTRAMUSCULAR; INTRAVENOUS EVERY 6 HOURS PRN
Status: DISCONTINUED | OUTPATIENT
Start: 2022-01-01 | End: 2022-12-21 | Stop reason: HOSPADM

## 2022-01-01 RX ORDER — ACETAMINOPHEN 325 MG/1
650 TABLET ORAL EVERY 4 HOURS PRN
Status: DISCONTINUED | OUTPATIENT
Start: 2022-01-01 | End: 2022-12-21 | Stop reason: HOSPADM

## 2022-01-01 RX ORDER — BISACODYL 10 MG
10 SUPPOSITORY, RECTAL RECTAL DAILY PRN
Status: DISCONTINUED | OUTPATIENT
Start: 2022-01-01 | End: 2022-01-01

## 2022-01-01 RX ORDER — LOSARTAN POTASSIUM 100 MG/1
100 TABLET ORAL DAILY
Status: DISCONTINUED | OUTPATIENT
Start: 2022-01-01 | End: 2022-01-01 | Stop reason: HOSPADM

## 2022-01-01 RX ORDER — BUSPIRONE HYDROCHLORIDE 10 MG/1
10 TABLET ORAL 2 TIMES DAILY
Status: DISCONTINUED | OUTPATIENT
Start: 2022-01-01 | End: 2022-01-01 | Stop reason: HOSPADM

## 2022-01-01 RX ORDER — ATROPINE SULFATE 10 MG/ML
2 SOLUTION/ DROPS OPHTHALMIC 2 TIMES DAILY PRN
Status: DISCONTINUED | OUTPATIENT
Start: 2022-01-01 | End: 2022-12-21 | Stop reason: HOSPADM

## 2022-01-01 RX ORDER — LORAZEPAM 2 MG/ML
2 CONCENTRATE ORAL
Status: DISCONTINUED | OUTPATIENT
Start: 2022-01-01 | End: 2022-12-21 | Stop reason: HOSPADM

## 2022-01-01 RX ORDER — KETOROLAC TROMETHAMINE 30 MG/ML
15 INJECTION, SOLUTION INTRAMUSCULAR; INTRAVENOUS EVERY 6 HOURS PRN
Status: DISCONTINUED | OUTPATIENT
Start: 2022-01-01 | End: 2022-12-21 | Stop reason: HOSPADM

## 2022-01-01 RX ORDER — LABETALOL HYDROCHLORIDE 5 MG/ML
10 INJECTION, SOLUTION INTRAVENOUS EVERY 6 HOURS PRN
Status: DISCONTINUED | OUTPATIENT
Start: 2022-01-01 | End: 2022-12-21 | Stop reason: HOSPADM

## 2022-01-01 RX ORDER — SODIUM CHLORIDE 9 MG/ML
40 INJECTION, SOLUTION INTRAVENOUS AS NEEDED
Status: DISCONTINUED | OUTPATIENT
Start: 2022-01-01 | End: 2022-01-01

## 2022-01-01 RX ORDER — DESVENLAFAXINE SUCCINATE 50 MG/1
50 TABLET, EXTENDED RELEASE ORAL EVERY MORNING
COMMUNITY
Start: 2022-01-01

## 2022-01-01 RX ORDER — DESVENLAFAXINE SUCCINATE 50 MG/1
50 TABLET, EXTENDED RELEASE ORAL EVERY MORNING
Status: DISCONTINUED | OUTPATIENT
Start: 2022-01-01 | End: 2022-01-01 | Stop reason: HOSPADM

## 2022-01-01 RX ORDER — ONDANSETRON 4 MG/1
4 TABLET, FILM COATED ORAL EVERY 6 HOURS PRN
Status: DISCONTINUED | OUTPATIENT
Start: 2022-01-01 | End: 2022-01-01 | Stop reason: HOSPADM

## 2022-01-01 RX ADMIN — LOSARTAN POTASSIUM 100 MG: 100 TABLET, FILM COATED ORAL at 09:41

## 2022-01-01 RX ADMIN — BUSPIRONE HYDROCHLORIDE 10 MG: 10 TABLET ORAL at 11:09

## 2022-01-01 RX ADMIN — BUSPIRONE HYDROCHLORIDE 10 MG: 10 TABLET ORAL at 08:41

## 2022-01-01 RX ADMIN — ACETAMINOPHEN 650 MG: 325 TABLET, FILM COATED ORAL at 22:07

## 2022-01-01 RX ADMIN — DOCUSATE SODIUM 100 MG: 100 CAPSULE, LIQUID FILLED ORAL at 08:00

## 2022-01-01 RX ADMIN — DOCUSATE SODIUM 50MG AND SENNOSIDES 8.6MG 2 TABLET: 8.6; 5 TABLET, FILM COATED ORAL at 20:13

## 2022-01-01 RX ADMIN — BUSPIRONE HYDROCHLORIDE 10 MG: 10 TABLET ORAL at 08:43

## 2022-01-01 RX ADMIN — HYDROMORPHONE HYDROCHLORIDE 0.5 MG: 1 INJECTION, SOLUTION INTRAMUSCULAR; INTRAVENOUS; SUBCUTANEOUS at 06:00

## 2022-01-01 RX ADMIN — Medication 10 ML: at 09:28

## 2022-01-01 RX ADMIN — DESVENLAFAXINE SUCCINATE 50 MG: 50 TABLET, EXTENDED RELEASE ORAL at 07:08

## 2022-01-01 RX ADMIN — Medication 10 ML: at 20:31

## 2022-01-01 RX ADMIN — BUSPIRONE HYDROCHLORIDE 10 MG: 10 TABLET ORAL at 08:07

## 2022-01-01 RX ADMIN — VENLAFAXINE HYDROCHLORIDE 37.5 MG: 75 TABLET ORAL at 09:48

## 2022-01-01 RX ADMIN — ONDANSETRON 4 MG: 2 INJECTION INTRAMUSCULAR; INTRAVENOUS at 08:49

## 2022-01-01 RX ADMIN — LOSARTAN POTASSIUM 100 MG: 100 TABLET, FILM COATED ORAL at 08:43

## 2022-01-01 RX ADMIN — VANCOMYCIN HYDROCHLORIDE 125 MG: 125 CAPSULE ORAL at 11:45

## 2022-01-01 RX ADMIN — TAZOBACTAM SODIUM AND PIPERACILLIN SODIUM 3.38 G: 375; 3 INJECTION, SOLUTION INTRAVENOUS at 01:45

## 2022-01-01 RX ADMIN — Medication 10 ML: at 00:45

## 2022-01-01 RX ADMIN — PERFLUTREN 2 ML: 6.52 INJECTION, SUSPENSION INTRAVENOUS at 12:51

## 2022-01-01 RX ADMIN — DOCUSATE SODIUM 50MG AND SENNOSIDES 8.6MG 2 TABLET: 8.6; 5 TABLET, FILM COATED ORAL at 20:47

## 2022-01-01 RX ADMIN — Medication 10 ML: at 20:08

## 2022-01-01 RX ADMIN — SODIUM CHLORIDE 75 ML/HR: 9 INJECTION, SOLUTION INTRAVENOUS at 00:44

## 2022-01-01 RX ADMIN — DOCUSATE SODIUM 50MG AND SENNOSIDES 8.6MG 2 TABLET: 8.6; 5 TABLET, FILM COATED ORAL at 09:04

## 2022-01-01 RX ADMIN — LOSARTAN POTASSIUM 100 MG: 100 TABLET, FILM COATED ORAL at 08:41

## 2022-01-01 RX ADMIN — Medication 10 ML: at 11:25

## 2022-01-01 RX ADMIN — Medication 10 ML: at 09:40

## 2022-01-01 RX ADMIN — HYDROMORPHONE HYDROCHLORIDE 1 MG: 1 INJECTION, SOLUTION INTRAMUSCULAR; INTRAVENOUS; SUBCUTANEOUS at 14:14

## 2022-01-01 RX ADMIN — DESVENLAFAXINE SUCCINATE 50 MG: 50 TABLET, EXTENDED RELEASE ORAL at 11:45

## 2022-01-01 RX ADMIN — SODIUM CHLORIDE 75 ML/HR: 9 INJECTION, SOLUTION INTRAVENOUS at 18:21

## 2022-01-01 RX ADMIN — DOCUSATE SODIUM 50MG AND SENNOSIDES 8.6MG 2 TABLET: 8.6; 5 TABLET, FILM COATED ORAL at 20:41

## 2022-01-01 RX ADMIN — HYDROMORPHONE HYDROCHLORIDE 1 MG: 1 INJECTION, SOLUTION INTRAMUSCULAR; INTRAVENOUS; SUBCUTANEOUS at 12:08

## 2022-01-01 RX ADMIN — TAZOBACTAM SODIUM AND PIPERACILLIN SODIUM 3.38 G: 375; 3 INJECTION, SOLUTION INTRAVENOUS at 00:55

## 2022-01-01 RX ADMIN — LEVETIRACETAM 500 MG: 100 INJECTION INTRAVENOUS at 21:38

## 2022-01-01 RX ADMIN — BUSPIRONE HYDROCHLORIDE 10 MG: 10 TABLET ORAL at 20:42

## 2022-01-01 RX ADMIN — TAZOBACTAM SODIUM AND PIPERACILLIN SODIUM 3.38 G: 375; 3 INJECTION, SOLUTION INTRAVENOUS at 01:43

## 2022-01-01 RX ADMIN — DESVENLAFAXINE SUCCINATE 50 MG: 50 TABLET, EXTENDED RELEASE ORAL at 06:02

## 2022-01-01 RX ADMIN — DOCUSATE SODIUM 50MG AND SENNOSIDES 8.6MG 2 TABLET: 8.6; 5 TABLET, FILM COATED ORAL at 09:48

## 2022-01-01 RX ADMIN — LORAZEPAM 2 MG: 2 INJECTION INTRAMUSCULAR; INTRAVENOUS at 16:23

## 2022-01-01 RX ADMIN — LEVETIRACETAM 1000 MG: 100 INJECTION INTRAVENOUS at 20:11

## 2022-01-01 RX ADMIN — Medication 10 ML: at 20:41

## 2022-01-01 RX ADMIN — HYDROMORPHONE HYDROCHLORIDE 0.5 MG: 1 INJECTION, SOLUTION INTRAMUSCULAR; INTRAVENOUS; SUBCUTANEOUS at 08:54

## 2022-01-01 RX ADMIN — Medication 10 ML: at 21:07

## 2022-01-01 RX ADMIN — LORAZEPAM 2 MG: 2 INJECTION INTRAMUSCULAR; INTRAVENOUS at 12:08

## 2022-01-01 RX ADMIN — TAZOBACTAM SODIUM AND PIPERACILLIN SODIUM 3.38 G: 375; 3 INJECTION, SOLUTION INTRAVENOUS at 08:03

## 2022-01-01 RX ADMIN — ACETAMINOPHEN 650 MG: 325 TABLET, FILM COATED ORAL at 07:32

## 2022-01-01 RX ADMIN — DOCUSATE SODIUM 100 MG: 100 CAPSULE, LIQUID FILLED ORAL at 08:50

## 2022-01-01 RX ADMIN — Medication 10 ML: at 09:04

## 2022-01-01 RX ADMIN — SODIUM CHLORIDE 75 ML/HR: 9 INJECTION, SOLUTION INTRAVENOUS at 05:23

## 2022-01-01 RX ADMIN — LORAZEPAM 2 MG: 2 INJECTION INTRAMUSCULAR; INTRAVENOUS at 02:55

## 2022-01-01 RX ADMIN — DOCUSATE SODIUM 50MG AND SENNOSIDES 8.6MG 2 TABLET: 8.6; 5 TABLET, FILM COATED ORAL at 08:00

## 2022-01-01 RX ADMIN — SODIUM CHLORIDE 3.5 ML: 9 INJECTION INTRAMUSCULAR; INTRAVENOUS; SUBCUTANEOUS at 08:02

## 2022-01-01 RX ADMIN — Medication 10 ML: at 08:41

## 2022-01-01 RX ADMIN — Medication 10 ML: at 09:27

## 2022-01-01 RX ADMIN — BUSPIRONE HYDROCHLORIDE 10 MG: 10 TABLET ORAL at 08:55

## 2022-01-01 RX ADMIN — Medication 10 ML: at 20:11

## 2022-01-01 RX ADMIN — Medication 10 ML: at 21:08

## 2022-01-01 RX ADMIN — BUSPIRONE HYDROCHLORIDE 10 MG: 10 TABLET ORAL at 20:13

## 2022-01-01 RX ADMIN — Medication 10 ML: at 10:14

## 2022-01-01 RX ADMIN — ACETAMINOPHEN 650 MG: 325 TABLET, FILM COATED ORAL at 18:38

## 2022-01-01 RX ADMIN — Medication 10 ML: at 08:43

## 2022-01-01 RX ADMIN — TAZOBACTAM SODIUM AND PIPERACILLIN SODIUM 3.38 G: 375; 3 INJECTION, SOLUTION INTRAVENOUS at 17:36

## 2022-01-01 RX ADMIN — ONDANSETRON 4 MG: 2 INJECTION INTRAMUSCULAR; INTRAVENOUS at 08:12

## 2022-01-01 RX ADMIN — DOCUSATE SODIUM 50MG AND SENNOSIDES 8.6MG 2 TABLET: 8.6; 5 TABLET, FILM COATED ORAL at 08:42

## 2022-01-01 RX ADMIN — BUSPIRONE HYDROCHLORIDE 10 MG: 10 TABLET ORAL at 23:16

## 2022-01-01 RX ADMIN — TAZOBACTAM SODIUM AND PIPERACILLIN SODIUM 3.38 G: 375; 3 INJECTION, SOLUTION INTRAVENOUS at 09:04

## 2022-01-01 RX ADMIN — Medication 10 ML: at 09:49

## 2022-01-01 RX ADMIN — BUSPIRONE HYDROCHLORIDE 10 MG: 10 TABLET ORAL at 11:45

## 2022-01-01 RX ADMIN — LOSARTAN POTASSIUM 100 MG: 100 TABLET, FILM COATED ORAL at 08:07

## 2022-01-01 RX ADMIN — ACETAMINOPHEN 650 MG: 325 TABLET, FILM COATED ORAL at 12:23

## 2022-01-01 RX ADMIN — GADOBENATE DIMEGLUMINE 14 ML: 529 INJECTION, SOLUTION INTRAVENOUS at 21:15

## 2022-01-01 RX ADMIN — BUSPIRONE HYDROCHLORIDE 10 MG: 10 TABLET ORAL at 21:10

## 2022-01-01 RX ADMIN — DOCUSATE SODIUM 100 MG: 100 CAPSULE, LIQUID FILLED ORAL at 09:48

## 2022-01-01 RX ADMIN — LEVETIRACETAM 500 MG: 100 INJECTION INTRAVENOUS at 08:07

## 2022-01-01 RX ADMIN — Medication 10 ML: at 08:50

## 2022-01-01 RX ADMIN — ACETAMINOPHEN 650 MG: 325 TABLET, FILM COATED ORAL at 13:19

## 2022-01-01 RX ADMIN — HYDROCODONE BITARTRATE AND ACETAMINOPHEN 1 TABLET: 5; 325 TABLET ORAL at 15:09

## 2022-01-01 RX ADMIN — BUSPIRONE HYDROCHLORIDE 10 MG: 10 TABLET ORAL at 09:26

## 2022-01-01 RX ADMIN — BUSPIRONE HYDROCHLORIDE 10 MG: 10 TABLET ORAL at 10:49

## 2022-01-01 RX ADMIN — DOCUSATE SODIUM 100 MG: 100 CAPSULE, LIQUID FILLED ORAL at 09:26

## 2022-01-01 RX ADMIN — TAZOBACTAM SODIUM AND PIPERACILLIN SODIUM 3.38 G: 375; 3 INJECTION, SOLUTION INTRAVENOUS at 01:49

## 2022-01-01 RX ADMIN — BUSPIRONE HYDROCHLORIDE 10 MG: 10 TABLET ORAL at 08:50

## 2022-01-01 RX ADMIN — Medication 10 ML: at 10:40

## 2022-01-01 RX ADMIN — HYDROCODONE BITARTRATE AND ACETAMINOPHEN 1 TABLET: 5; 325 TABLET ORAL at 20:47

## 2022-01-01 RX ADMIN — BUSPIRONE HYDROCHLORIDE 10 MG: 10 TABLET ORAL at 20:54

## 2022-01-01 RX ADMIN — LEVETIRACETAM 1000 MG: 100 INJECTION INTRAVENOUS at 08:41

## 2022-01-01 RX ADMIN — TAZOBACTAM SODIUM AND PIPERACILLIN SODIUM 3.38 G: 375; 3 INJECTION, SOLUTION INTRAVENOUS at 16:46

## 2022-01-01 RX ADMIN — LOSARTAN POTASSIUM 100 MG: 100 TABLET, FILM COATED ORAL at 09:06

## 2022-01-01 RX ADMIN — SODIUM CHLORIDE 75 ML/HR: 9 INJECTION, SOLUTION INTRAVENOUS at 23:16

## 2022-01-01 RX ADMIN — HYDRALAZINE HYDROCHLORIDE 50 MG: 50 TABLET, FILM COATED ORAL at 11:45

## 2022-01-01 RX ADMIN — LEVETIRACETAM 1000 MG: 100 INJECTION INTRAVENOUS at 09:04

## 2022-01-01 RX ADMIN — LABETALOL HYDROCHLORIDE 10 MG: 5 INJECTION, SOLUTION INTRAVENOUS at 08:49

## 2022-01-01 RX ADMIN — TAZOBACTAM SODIUM AND PIPERACILLIN SODIUM 3.38 G: 375; 3 INJECTION, SOLUTION INTRAVENOUS at 01:13

## 2022-01-01 RX ADMIN — Medication 10 ML: at 20:54

## 2022-01-01 RX ADMIN — LORAZEPAM 2 MG: 2 INJECTION INTRAMUSCULAR; INTRAVENOUS at 06:00

## 2022-01-01 RX ADMIN — TAZOBACTAM SODIUM AND PIPERACILLIN SODIUM 3.38 G: 375; 3 INJECTION, SOLUTION INTRAVENOUS at 16:33

## 2022-01-01 RX ADMIN — LEVETIRACETAM 1000 MG: 100 INJECTION INTRAVENOUS at 08:00

## 2022-01-01 RX ADMIN — LEVETIRACETAM 500 MG: 100 INJECTION INTRAVENOUS at 08:43

## 2022-01-01 RX ADMIN — BUSPIRONE HYDROCHLORIDE 10 MG: 10 TABLET ORAL at 09:48

## 2022-01-01 RX ADMIN — HYDROMORPHONE HYDROCHLORIDE 1 MG: 1 INJECTION, SOLUTION INTRAMUSCULAR; INTRAVENOUS; SUBCUTANEOUS at 16:23

## 2022-01-01 RX ADMIN — Medication 10 ML: at 20:13

## 2022-01-01 RX ADMIN — LEVETIRACETAM 1000 MG: 100 INJECTION INTRAVENOUS at 22:54

## 2022-01-01 RX ADMIN — LEVETIRACETAM 1000 MG: 100 INJECTION INTRAVENOUS at 21:10

## 2022-01-01 RX ADMIN — Medication 10 ML: at 21:10

## 2022-01-01 RX ADMIN — DOCUSATE SODIUM 100 MG: 100 CAPSULE, LIQUID FILLED ORAL at 09:04

## 2022-01-01 RX ADMIN — IOPAMIDOL 85 ML: 612 INJECTION, SOLUTION INTRAVENOUS at 10:09

## 2022-01-01 RX ADMIN — HYDROMORPHONE HYDROCHLORIDE 0.5 MG: 1 INJECTION, SOLUTION INTRAMUSCULAR; INTRAVENOUS; SUBCUTANEOUS at 21:17

## 2022-01-01 RX ADMIN — HYDROCODONE BITARTRATE AND ACETAMINOPHEN 1 TABLET: 5; 325 TABLET ORAL at 14:53

## 2022-01-01 RX ADMIN — Medication 10 ML: at 20:14

## 2022-01-01 RX ADMIN — BUSPIRONE HYDROCHLORIDE 10 MG: 10 TABLET ORAL at 20:30

## 2022-01-01 RX ADMIN — SODIUM CHLORIDE 75 ML/HR: 9 INJECTION, SOLUTION INTRAVENOUS at 09:31

## 2022-01-01 RX ADMIN — VENLAFAXINE HYDROCHLORIDE 37.5 MG: 75 TABLET ORAL at 20:41

## 2022-01-01 RX ADMIN — HYDROCODONE BITARTRATE AND ACETAMINOPHEN 1 TABLET: 5; 325 TABLET ORAL at 18:45

## 2022-01-01 RX ADMIN — LIDOCAINE 1 PATCH: 50 PATCH TOPICAL at 14:27

## 2022-01-01 RX ADMIN — LOSARTAN POTASSIUM 100 MG: 100 TABLET, FILM COATED ORAL at 09:26

## 2022-01-01 RX ADMIN — DOCUSATE SODIUM 50MG AND SENNOSIDES 8.6MG 2 TABLET: 8.6; 5 TABLET, FILM COATED ORAL at 20:11

## 2022-01-01 RX ADMIN — TAZOBACTAM SODIUM AND PIPERACILLIN SODIUM 3.38 G: 375; 3 INJECTION, SOLUTION INTRAVENOUS at 16:14

## 2022-01-01 RX ADMIN — Medication 10 ML: at 20:12

## 2022-01-01 RX ADMIN — Medication 10 ML: at 11:56

## 2022-01-01 RX ADMIN — SODIUM CHLORIDE 75 ML/HR: 9 INJECTION, SOLUTION INTRAVENOUS at 08:44

## 2022-01-01 RX ADMIN — LEVETIRACETAM 500 MG: 100 INJECTION INTRAVENOUS at 20:54

## 2022-01-01 RX ADMIN — HYDROMORPHONE HYDROCHLORIDE 0.5 MG: 1 INJECTION, SOLUTION INTRAMUSCULAR; INTRAVENOUS; SUBCUTANEOUS at 02:55

## 2022-01-01 RX ADMIN — Medication 10 ML: at 05:23

## 2022-01-01 RX ADMIN — IOPAMIDOL 95 ML: 755 INJECTION, SOLUTION INTRAVENOUS at 09:24

## 2022-01-01 RX ADMIN — LOSARTAN POTASSIUM 100 MG: 100 TABLET, FILM COATED ORAL at 08:55

## 2022-01-01 RX ADMIN — LORAZEPAM 2 MG: 2 INJECTION INTRAMUSCULAR; INTRAVENOUS at 14:14

## 2022-01-01 RX ADMIN — Medication 10 ML: at 10:46

## 2022-01-01 RX ADMIN — BUSPIRONE HYDROCHLORIDE 10 MG: 10 TABLET ORAL at 20:08

## 2022-01-01 RX ADMIN — TAZOBACTAM SODIUM AND PIPERACILLIN SODIUM 3.38 G: 375; 3 INJECTION, SOLUTION INTRAVENOUS at 08:08

## 2022-01-01 RX ADMIN — SODIUM CHLORIDE 75 ML/HR: 9 INJECTION, SOLUTION INTRAVENOUS at 15:16

## 2022-01-01 RX ADMIN — Medication 10 ML: at 08:07

## 2022-01-01 RX ADMIN — BUSPIRONE HYDROCHLORIDE 10 MG: 10 TABLET ORAL at 20:47

## 2022-01-01 RX ADMIN — ONDANSETRON 4 MG: 2 INJECTION INTRAMUSCULAR; INTRAVENOUS at 10:38

## 2022-01-01 RX ADMIN — VENLAFAXINE HYDROCHLORIDE 37.5 MG: 75 TABLET ORAL at 20:46

## 2022-01-01 RX ADMIN — VANCOMYCIN HYDROCHLORIDE 125 MG: 125 CAPSULE ORAL at 18:05

## 2022-01-01 RX ADMIN — DOCUSATE SODIUM 50MG AND SENNOSIDES 8.6MG 2 TABLET: 8.6; 5 TABLET, FILM COATED ORAL at 08:49

## 2022-01-01 RX ADMIN — LOSARTAN POTASSIUM 100 MG: 100 TABLET, FILM COATED ORAL at 11:45

## 2022-01-01 RX ADMIN — Medication 10 ML: at 21:01

## 2022-01-01 RX ADMIN — Medication 10 ML: at 08:08

## 2022-01-01 RX ADMIN — LOSARTAN POTASSIUM 100 MG: 100 TABLET, FILM COATED ORAL at 08:50

## 2022-01-01 RX ADMIN — TAZOBACTAM SODIUM AND PIPERACILLIN SODIUM 3.38 G: 375; 3 INJECTION, SOLUTION INTRAVENOUS at 08:43

## 2022-01-01 RX ADMIN — TAZOBACTAM SODIUM AND PIPERACILLIN SODIUM 3.38 G: 375; 3 INJECTION, SOLUTION INTRAVENOUS at 11:38

## 2022-01-01 RX ADMIN — HYDROCODONE BITARTRATE AND ACETAMINOPHEN 1 TABLET: 5; 325 TABLET ORAL at 09:48

## 2022-01-01 RX ADMIN — TAZOBACTAM SODIUM AND PIPERACILLIN SODIUM 3.38 G: 375; 3 INJECTION, SOLUTION INTRAVENOUS at 08:41

## 2022-01-01 RX ADMIN — HYDROCODONE BITARTRATE AND ACETAMINOPHEN 1 TABLET: 5; 325 TABLET ORAL at 20:41

## 2022-01-01 RX ADMIN — LORAZEPAM 2 MG: 2 INJECTION INTRAMUSCULAR; INTRAVENOUS at 21:17

## 2022-01-01 RX ADMIN — DOCUSATE SODIUM 50MG AND SENNOSIDES 8.6MG 2 TABLET: 8.6; 5 TABLET, FILM COATED ORAL at 09:26

## 2022-01-01 RX ADMIN — VENLAFAXINE HYDROCHLORIDE 37.5 MG: 75 TABLET ORAL at 08:50

## 2022-01-01 RX ADMIN — ACETAMINOPHEN 650 MG: 325 TABLET, FILM COATED ORAL at 23:32

## 2022-01-01 RX ADMIN — TAZOBACTAM SODIUM AND PIPERACILLIN SODIUM 3.38 G: 375; 3 INJECTION, SOLUTION INTRAVENOUS at 16:04

## 2022-01-01 RX ADMIN — ACETAMINOPHEN 650 MG: 325 TABLET, FILM COATED ORAL at 11:09

## 2022-01-01 RX ADMIN — LIDOCAINE HYDROCHLORIDE 4 ML: 10 INJECTION, SOLUTION INFILTRATION; PERINEURAL at 14:18

## 2022-01-01 RX ADMIN — LORAZEPAM 2 MG: 2 INJECTION INTRAMUSCULAR; INTRAVENOUS at 18:51

## 2022-01-01 RX ADMIN — DESVENLAFAXINE SUCCINATE 50 MG: 50 TABLET, EXTENDED RELEASE ORAL at 06:34

## 2022-01-01 RX ADMIN — HYDROMORPHONE HYDROCHLORIDE 0.5 MG: 1 INJECTION, SOLUTION INTRAMUSCULAR; INTRAVENOUS; SUBCUTANEOUS at 14:56

## 2022-01-01 RX ADMIN — DOCUSATE SODIUM 50MG AND SENNOSIDES 8.6MG 2 TABLET: 8.6; 5 TABLET, FILM COATED ORAL at 08:55

## 2022-01-01 RX ADMIN — DOCUSATE SODIUM 100 MG: 100 CAPSULE, LIQUID FILLED ORAL at 08:41

## 2022-01-01 RX ADMIN — HYDROMORPHONE HYDROCHLORIDE 0.5 MG: 1 INJECTION, SOLUTION INTRAMUSCULAR; INTRAVENOUS; SUBCUTANEOUS at 18:51

## 2022-01-01 RX ADMIN — LORAZEPAM 2 MG: 2 INJECTION INTRAMUSCULAR; INTRAVENOUS at 14:33

## 2022-01-01 RX ADMIN — LOSARTAN POTASSIUM 100 MG: 100 TABLET, FILM COATED ORAL at 09:48

## 2022-01-01 RX ADMIN — BUSPIRONE HYDROCHLORIDE 10 MG: 10 TABLET ORAL at 20:11

## 2022-01-01 RX ADMIN — LEVETIRACETAM 500 MG: 100 INJECTION INTRAVENOUS at 20:13

## 2022-01-01 RX ADMIN — LOSARTAN POTASSIUM 100 MG: 100 TABLET, FILM COATED ORAL at 11:09

## 2022-01-01 RX ADMIN — LEVETIRACETAM 1000 MG: 100 INJECTION INTRAVENOUS at 20:08

## 2022-01-01 RX ADMIN — LORAZEPAM 2 MG: 2 INJECTION INTRAMUSCULAR; INTRAVENOUS at 08:53

## 2022-01-13 PROBLEM — A04.72 C. DIFFICILE COLITIS: Status: ACTIVE | Noted: 2022-01-01

## 2022-01-13 NOTE — TELEPHONE ENCOUNTER
Caller: Karolina Hoyt    Relationship to patient: Self    Best call back number: [phone]    Patient is needing: PT IS REQUESTING SAME DAY APPT FOR SHORTNESS OF BREATH AND WEAKNESS, SHE WAS EXPOSED TO COVID. UNABLE TO WT, NO ANSWER

## 2022-01-13 NOTE — PROGRESS NOTES
"Chief Complaint  Shortness of Breath and Fatigue    Subjective          Karolina Hoyt presents to [facility]  History of Present Illness  Here because she just feels bad- fatigued.  No fevers.  She had a small exposure to Covid via her 2 daughters several days ago.  She denies respiratory sx, does have mild body aches. No diarrhea.     Objective   Vital Signs:   Temp 98.8 °F (37.1 °C)   Ht 166.4 cm (65.5\")   Wt 64 kg (141 lb)   BMI 23.11 kg/m²     Physical Exam  Constitutional:       General: She is not in acute distress.     Appearance: Normal appearance.   Cardiovascular:      Rate and Rhythm: Normal rate.   Pulmonary:      Effort: Pulmonary effort is normal.      Breath sounds: Normal breath sounds.        Result Review :                 Assessment and Plan    Diagnoses and all orders for this visit:    1. Fatigue, unspecified type (Primary)  Comments:  check Covid given exposure.  Recheck if needed.   Orders:  -     COVID-19,[facility] ROUTINE, NP/OP SWAB IN TRANSPORT MEDIA OR ESWAB 72 HR TAT - Swab, Nasopharynx    2. Essential hypertension        Follow Up   No follow-ups on file.  Patient was given instructions and counseling regarding her condition or for health maintenance advice. Please see specific information pulled into the AVS if appropriate.       "

## 2022-04-12 NOTE — TELEPHONE ENCOUNTER
Caller: VAL OJEDA     Relationship: Child    Best call back number: [phone]    What was the call regarding: PATIENT'S DAUGHTER STATED THAT SHE NEEDED TO SPEAK WITH DR WESTON REGARDING PATIENT POSSIBLY HAVING EARLY ALZHEIMERS. PATIENT'S DAUGHTER STATED THAT IT HAS GOTTEN PROGRESSIVELY WORSE. PLEASE ADVISE.     Do you require a callback: YES

## 2022-04-13 NOTE — TELEPHONE ENCOUNTER
I spoke to her daughter- on HIPAA- she is going to talk to psychiatrist and I am putting in referral for neuropsych testing.

## 2022-05-17 NOTE — PROGRESS NOTES
"Chief Complaint  Alzheimer's Disease and Depression    Subjective          Karolina Hoyt presents to [facility]  History of Present Illness here with daughter to discuss mood changes- she has cycles of \"going down the rabbit hole.\"  Her mother and brother had dementia- mothers was rapid decline.   She has memory issues when she's down, doesn't notice as much with doing better.  She thinks this has gotten worse since we started her on BP medication.  Family believes she is very worried about the possibility of having Alzheimer's dementia.    Objective   Vital Signs:  /72   Pulse 82   Temp 97.7 °F (36.5 °C) (Infrared)   Resp 16   Ht 166.4 cm (65.51\")   Wt 62.1 kg (137 lb)   BMI 22.44 kg/m²   BMI is within normal parameters. No other follow-up for BMI required.      Physical Exam  Constitutional:       General: She is not in acute distress.     Appearance: Normal appearance.   Cardiovascular:      Rate and Rhythm: Normal rate.   Neurological:      General: No focal deficit present.   Psychiatric:         Mood and Affect: Mood normal.         Behavior: Behavior normal.         Thought Content: Thought content normal.        Result Review :                 Assessment and Plan    Diagnoses and all orders for this visit:    1. Essential hypertension (Primary)  Comments:  same med, reassured.     2. Anxiety  Comments:  I think her memory issues are related but need to r/o other given strong family history.  I am referring for neuropsych testing.  will also d/w psychiatrist.              Follow Up   Return for Keep previously scheduled appointment.  Patient was given instructions and counseling regarding her condition or for health maintenance advice. Please see specific information pulled into the AVS if appropriate.       "

## 2022-08-02 NOTE — PROGRESS NOTES
The ABCs of the Annual Wellness Visit  Subsequent Medicare Wellness Visit    Chief Complaint   Patient presents with   • Medicare Wellness-subsequent      Subjective    History of Present Illness:  Karolina Hoyt is a 72 y.o. female who presents for a Subsequent Medicare Wellness Visit.  Has an appt for neuropsych testing- hasn't had done yet. Doesn't think that the memory is any different.  Feels overwhelmed since her  has been dx with cancer- he is getting chemo and XRT.    Seeing Dr. Guillaume- added Pristiq now, not sure yet if it has helped.   Sometimes when she is working around the house she gets SOB, has to stop.  She does work for exercise 1 mile- does feel SOB but doesn't have to stop.  Worse when she's talking.   Thinks she is having more trouble with diarrhea, has always had some issues when she is more anxious, now happening even more often.  She does get better with Pepto- often 2-3 times per week. When the Pepto wears off, the stools get loose and frequent again. Weight is stable.     The following portions of the patient's history were reviewed and   updated as appropriate: allergies, current medications, past family history, past medical history, past social history, past surgical history and problem list.    Compared to one year ago, the patient feels her physical   health is the same.    Compared to one year ago, the patient feels her mental   health is the same.    Recent Hospitalizations:  She was not admitted to the hospital during the last year.       Current Medical Providers:  Patient Care Team:  Josie Youssef MD as PCP - General (Internal Medicine)  Emelyn Bradshaw MD as Surgeon (General Surgery)  Leydi Guillaume MD as Consulting Physician (Psychiatry)  Yaa Ingram MD as Consulting Physician (Obstetrics and Gynecology)  Shruti Ibrahim APRN as Nurse Practitioner (Gastroenterology)    Outpatient Medications Prior to Visit   Medication Sig Dispense Refill   • busPIRone  "(BUSPAR) 10 MG tablet Take 1 tablet by mouth 2 (Two) Times a Day. 180 tablet 1   • desvenlafaxine (PRISTIQ) 50 MG 24 hr tablet Take 50 mg by mouth Every Morning.     • hydrALAZINE (APRESOLINE) 50 MG tablet TAKE 1 TABLET TWICE A  tablet 1   • losartan (COZAAR) 100 MG tablet TAKE 1 TABLET BY MOUTH DAILY 90 tablet 1     No facility-administered medications prior to visit.       No opioid medication identified on active medication list. I have reviewed chart for other potential  high risk medication/s and harmful drug interactions in the elderly.          Aspirin is not on active medication list.  Aspirin use is not indicated based on review of current medical condition/s. Risk of harm outweighs potential benefits.  .    Patient Active Problem List   Diagnosis   • Diverticulosis of large intestine   • Essential hypertension   • Anxiety   • C. difficile colitis     Advance Care Planning  Advance Directive is not on file.  ACP discussion was held with the patient during this visit. Patient has an advance directive (not in EMR), copy requested.    Review of Systems   HENT: Negative for congestion, dental problem, hearing loss and trouble swallowing.    Respiratory: Negative for cough and shortness of breath.    Cardiovascular: Negative for chest pain and leg swelling.   Gastrointestinal: Negative for abdominal pain.   Genitourinary: Negative for difficulty urinating.   Musculoskeletal: Negative for arthralgias and back pain.   Neurological: Positive for confusion. Negative for dizziness.   Psychiatric/Behavioral: Negative for dysphoric mood and sleep disturbance. The patient is nervous/anxious.         Objective    Vitals:    08/02/22 1258   BP: 140/68   Pulse: 78   Temp: 97 °F (36.1 °C)   Weight: 63 kg (139 lb)   Height: 166.4 cm (65.51\")     Estimated body mass index is 22.77 kg/m² as calculated from the following:    Height as of this encounter: 166.4 cm (65.51\").    Weight as of this encounter: 63 kg (139 " lb).    BMI is within normal parameters. No other follow-up for BMI required.      Does the patient have evidence of cognitive impairment? No    Physical Exam  Constitutional:       Appearance: Normal appearance.   Cardiovascular:      Rate and Rhythm: Normal rate and regular rhythm.      Heart sounds: Murmur (RUSB) heard.     No friction rub.   Pulmonary:      Effort: Pulmonary effort is normal.      Breath sounds: Normal breath sounds.   Musculoskeletal:      Right lower leg: No edema.      Left lower leg: No edema.   Psychiatric:         Mood and Affect: Mood normal.         Thought Content: Thought content normal.                 HEALTH RISK ASSESSMENT    Smoking Status:  Social History     Tobacco Use   Smoking Status Former Smoker   • Packs/day: 1.00   • Years: 40.00   • Pack years: 40.00   Smokeless Tobacco Never Used   Tobacco Comment    quit approx 2009     Alcohol Consumption:  Social History     Substance and Sexual Activity   Alcohol Use Yes    Comment: rarely     Fall Risk Screen:    STEADI Fall Risk Assessment was completed, and patient is at LOW risk for falls.Assessment completed on:8/2/2022    Depression Screening:  PHQ-2/PHQ-9 Depression Screening 8/2/2022   Retired PHQ-9 Total Score -   Retired Total Score -   Little Interest or Pleasure in Doing Things 0-->not at all   Feeling Down, Depressed or Hopeless 0-->not at all   PHQ-9: Brief Depression Severity Measure Score 0       Health Habits and Functional and Cognitive Screening:  Functional & Cognitive Status 8/2/2022   Do you have difficulty preparing food and eating? -   Do you have difficulty bathing yourself, getting dressed or grooming yourself? -   Do you have difficulty using the toilet? -   Do you have difficulty moving around from place to place? -   Do you have trouble with steps or getting out of a bed or a chair? -   Current Diet Well Balanced Diet   Dental Exam Up to date   Eye Exam Up to date   Exercise (times per week) 0 times per  week   Current Exercises Include No Regular Exercise   Current Exercise Activities Include -   Do you need help using the phone?  -   Are you deaf or do you have serious difficulty hearing?  -   Do you need help with transportation? -   Do you need help shopping? -   Do you need help preparing meals?  -   Do you need help with housework?  -   Do you need help with laundry? -   Do you need help taking your medications? -   Do you need help managing money? -   Do you ever drive or ride in a car without wearing a seat belt? -   Have you felt unusual stress, anger or loneliness in the last month? -   Who do you live with? -   If you need help, do you have trouble finding someone available to you? -   Have you been bothered in the last four weeks by sexual problems? -   Do you have difficulty concentrating, remembering or making decisions? No       Age-appropriate Screening Schedule:  Refer to the list below for future screening recommendations based on patient's age, sex and/or medical conditions. Orders for these recommended tests are listed in the plan section. The patient has been provided with a written plan.    Health Maintenance   Topic Date Due   • TDAP/TD VACCINES (1 - Tdap) Never done   • ZOSTER VACCINE (1 of 2) Never done   • DXA SCAN  08/13/2020   • INFLUENZA VACCINE  10/01/2022   • MAMMOGRAM  04/06/2023              Assessment & Plan   CMS Preventative Services Quick Reference  Risk Factors Identified During Encounter  Immunizations Discussed/Encouraged (specific Immunizations; Shingrix and COVID19  The above risks/problems have been discussed with the patient.  Follow up actions/plans if indicated are seen below in the Assessment/Plan Section.  Pertinent information has been shared with the patient in the After Visit Summary.    Diagnoses and all orders for this visit:    1. ALFARO (dyspnea on exertion) (Primary)  Comments:  check echo, further eval if symptoms worsen.      2. Essential  hypertension  Comments:  controlled    3. Anxiety  Comments:  feels things are better but still stressed about her .  pending neuropsych evaluation.     4. C. difficile colitis  Comments:  suspect she is having some symptoms - will recheck stools today and treat accordingly.   Orders:  -     Clostridioides difficile Toxin, PCR - Stool, Per Rectum    5. Diarrhea of presumed infectious origin  Comments:  as above  Orders:  -     Clostridioides difficile Toxin, PCR - Stool, Per Rectum  -     CBC & Differential  -     Comprehensive Metabolic Panel    6. Medicare annual wellness visit, subsequent  Comments:  gyn UTD, recommend Shingrix vaccine.  Continue exercise.  Address the above.  Recheck 3 months.         Follow Up:   Return in about 3 months (around 11/2/2022) for Recheck, Lab Today.     An After Visit Summary and PPPS were made available to the patient.

## 2022-08-08 NOTE — TELEPHONE ENCOUNTER
Good morning!  This mutual patient of ours has c dif again- she saw me last week and said she started having more frequent diarrhea again- how do you recommend I treat her?  Thank-   Took Cinthia in December.

## 2022-08-15 PROBLEM — M25.561 RIGHT KNEE PAIN: Status: ACTIVE | Noted: 2022-01-01

## 2022-08-15 PROBLEM — I60.9 SUBARACHNOID HEMORRHAGE (HCC): Status: ACTIVE | Noted: 2022-01-01

## 2022-08-15 NOTE — DISCHARGE SUMMARY
[facility]    Date of Discharge:  8/15/2022    PCP: Josie Youssef MD    Discharge Diagnosis:   Active Hospital Problems    Diagnosis  POA   • **Subarachnoid hemorrhage (HCC) [I60.9]  Yes   • Right knee pain [M25.561]  Yes   • C. difficile colitis [A04.72]  Yes   • Essential hypertension [I10]  Yes   • Diverticulosis of large intestine [K57.30]  Yes      Resolved Hospital Problems   No resolved problems to display.          Consults     Date and Time Order Name Status Description    8/15/2022  1:08 AM Inpatient Neurosurgery Consult Completed     8/15/2022 12:53 AM [facility] (on-call MD unless specified) Details          Hospital Course   This is a 72-year-old female, with history of hypertension, depression, presented to the hospital, after fall and head injury, admitted for subarachnoid hemorrhage.  Neurosurgery has been consulted, patient is clinically remained stable, serial neuro exam remains normal.  Patient has been cleared for discharge by neurosurgery today.  I have seen and examined patient at bedside, total time spent on discharge and management is more than 30 minutes.    Condition on Discharge: Improved.     Temp:  [97.4 °F (36.3 °C)-99.6 °F (37.6 °C)] 98.1 °F (36.7 °C)  Heart Rate:  [65-97] 70  Resp:  [15-16] 16  BP: (136-164)/(77-93) 136/78  Body mass index is 22.6 kg/m².    Physical Exam   General, awake and alert.  Head and ENT, normocephalic and atraumatic.  Right frontal area bruising noted.  Lungs, symmetric expansion, equal air entry bilaterally.  Heart, regular rate and rhythm.  Abdomen, soft and nontender.  Extremities, no clubbing or cyanosis.  Neuro, no focal deficits.  Skin: Warm and no rash.  Psych, normal mood and affect.  Musculoskeletal, joint examination is grossly normal.    Disposition: Home or Self Care       Discharge Medications      Changes to Medications      Instructions Start Date   hydrALAZINE 50 MG tablet  Commonly known as:  APRESOLINE  What changed:   · how to take this  · when to take this  · additional instructions   TAKE 1 TABLET TWICE A DAY         Continue These Medications      Instructions Start Date   busPIRone 10 MG tablet  Commonly known as: BUSPAR   10 mg, Oral, 2 Times Daily      desvenlafaxine 50 MG 24 hr tablet  Commonly known as: PRISTIQ   50 mg, Oral, Every Morning      losartan 100 MG tablet  Commonly known as: COZAAR   100 mg, Oral, Daily      vancomycin 125 MG capsule  Commonly known as: VANCOCIN   125 mg, Oral, 4 Times Daily              Additional Instructions for the Follow-ups that You Need to Schedule     Discharge Follow-up with PCP   As directed       Currently Documented PCP:    Josie Youssef MD    PCP Phone Number:    [phone]     Follow Up Details: Follow-up with PCP in 7 days.            Follow-up Information     Josie Youssef MD .    Specialty: Internal Medicine  Why: Follow-up with PCP in 7 days.  Contact information:  [facility] [place] [address]  [phone]                           Mau Meek MD  [facility]  08/15/22    Discharge time spent greater than 30 minutes.

## 2022-08-15 NOTE — H&P
Patient Name:  Karolina Hoyt  YOB: 1950  MRN:  [id number]  Admit Date:  8/14/2022  Patient Care Team:  Josie Youssef MD as PCP - General (Internal Medicine)  Emelyn Bradshaw MD as Surgeon (General Surgery)  Leydi Guillaume MD as Consulting Physician (Psychiatry)  Yaa Ingram MD as Consulting Physician (Obstetrics and Gynecology)  Shruti Ibrahim APRN as Nurse Practitioner (Gastroenterology)      Subjective   History Present Illness     Chief Complaint   Patient presents with   • Fall   • Head Injury       Ms. Hoyt is a 72 y.o. former smoker with a history of HTN, depression, C. Diff who presented to [facility] ER for chief complaint of fall & head injury & admitted for subarachnoid hemorrhage.     Tripped on ramp at neighbor's house & hit head with subsequent injury to head; therefore, went to [facility] ER for further evaluation.     Denies LOC or anticoagulation prior to hospital admission.      CT head suspicion for trace amount of subarachnoid hemorrhage.    Recommendation pending hospital course.  Details below.    History of Present Illness  Review of Systems   Constitutional: Negative for chills and fever.   HENT: Negative for congestion and rhinorrhea.    Eyes: Negative for visual disturbance.   Respiratory: Negative for cough and shortness of breath.    Cardiovascular: Negative for chest pain and leg swelling.   Gastrointestinal: Negative for abdominal pain, constipation, diarrhea, nausea and vomiting.   Endocrine: Negative for polydipsia, polyphagia and polyuria.   Genitourinary: Negative for difficulty urinating and dysuria.   Musculoskeletal: Negative for gait problem and myalgias.   Skin: Negative for rash and wound.   Neurological: Negative for syncope, light-headedness, numbness and headaches.   Psychiatric/Behavioral: Negative for confusion and hallucinations.        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     -never treated-   • Tachycardia      Past Surgical History:   Procedure Laterality Date   • COLONOSCOPY N/A 2/5/2018    Procedure: COLONOSCOPY TO CECUM;  Surgeon: Emelyn Bradshaw MD;  Location: [facility] ENDOSCOPY;  Service:    • HYSTERECTOMY     • THYROID LOBECTOMY      approx 1999 or 2000   • THYROIDECTOMY, PARTIAL  2002    Sub-Total Thyroidectomy     Family History   Problem Relation Age of Onset   • Dementia Mother    • Breast cancer Mother    • Tuberculosis Father    • Breast cancer Sister    • Mental illness Sister         Family history of suicide   • Prostate cancer Brother      Social History     Tobacco Use   • Smoking status: Former Smoker     Packs/day: 1.00     Years: 40.00     Pack years: 40.00   • Smokeless tobacco: Never Used   • Tobacco comment: quit approx 2009   Substance Use Topics   • Alcohol use: Yes     Comment: rarely   • Drug use: No     No current facility-administered medications on file prior to encounter.     Current Outpatient Medications on File Prior to Encounter   Medication Sig Dispense Refill   • busPIRone (BUSPAR) 10 MG tablet Take 1 tablet by mouth 2 (Two) Times a Day. 180 tablet 1   • desvenlafaxine (PRISTIQ) 50 MG 24 hr tablet Take 50 mg by mouth Every Morning.     • hydrALAZINE (APRESOLINE) 50 MG tablet TAKE 1 TABLET TWICE A DAY (Patient taking differently: 50 mg Daily. Patient states once a day, at noon) 180 tablet 1   • losartan (COZAAR) 100 MG tablet TAKE 1 TABLET BY MOUTH DAILY 90 tablet 1   • vancomycin (VANCOCIN) 125 MG capsule Take 1 capsule by mouth 4 (Four) Times a Day. 40 capsule 0     Allergies   Allergen Reactions   • Erythromycin Nausea Only       Objective    Objective     Vital Signs  Temp:  [97.4 °F (36.3 °C)-99.6 °F (37.6 °C)] 97.4 °F (36.3 °C)  Heart Rate:  [65-97] 65  Resp:  [15-16] 15  BP: (138-164)/(77-93) 155/83  SpO2:  [92 %-98 %] 92 %  on   ;   Device (Oxygen Therapy):  room air  Body mass index is 22.6 kg/m².    Physical Exam  Constitutional:       General: She is not in acute distress.     Appearance: She is not toxic-appearing.   HENT:      Head: Normocephalic.      Comments: Right frontal region  Eyes:      Extraocular Movements: Extraocular movements intact.      Conjunctiva/sclera: Conjunctivae normal.   Cardiovascular:      Rate and Rhythm: Normal rate.      Heart sounds: Normal heart sounds.   Pulmonary:      Effort: Pulmonary effort is normal.      Comments: Diminished on expiration throughout all lung fields  Abdominal:      General: Bowel sounds are normal.      Palpations: Abdomen is soft.   Musculoskeletal:         General: No tenderness.      Cervical back: Normal range of motion and neck supple.      Right lower leg: No edema.      Left lower leg: No edema.   Skin:     General: Skin is warm and dry.   Neurological:      Mental Status: She is alert and oriented to person, place, and time.      Cranial Nerves: No cranial nerve deficit.   Psychiatric:         Behavior: Behavior normal.         Thought Content: Thought content normal.         Results Review:  I reviewed the patient's new clinical results.  I reviewed the patient's new imaging results and agree with the interpretation.  I reviewed the patient's other test results and agree with the interpretation  I personally viewed and interpreted the patient's EKG/Telemetry data  Discussed with ED provider.    Lab Results (last 24 hours)     Procedure Component Value Units Date/Time    CBC & Differential [[id number]]  (Normal) Collected: 08/15/22 0126    Specimen: Blood Updated: 08/15/22 0138    Narrative:      The following orders were created for panel order CBC & Differential.  Procedure                               Abnormality         Status                     ---------                               -----------         ------                     CBC Auto Differential[[id number]]        Normal              Final  result                 Please view results for these tests on the individual orders.    Comprehensive Metabolic Panel [[id number]]  (Abnormal) Collected: 08/15/22 0126    Specimen: Blood Updated: 08/15/22 0155     Glucose 100 mg/dL      BUN 19 mg/dL      Creatinine 0.73 mg/dL      Sodium 141 mmol/L      Potassium 4.2 mmol/L      Chloride 104 mmol/L      CO2 28.2 mmol/L      Calcium 9.7 mg/dL      Total Protein 6.5 g/dL      Albumin 4.40 g/dL      ALT (SGPT) 16 U/L      AST (SGOT) 20 U/L      Alkaline Phosphatase 83 U/L      Total Bilirubin 1.0 mg/dL      Globulin 2.1 gm/dL      A/G Ratio 2.1 g/dL      BUN/Creatinine Ratio 26.0     Anion Gap 8.8 mmol/L      eGFR 87.5 mL/min/1.73      Comment: National Kidney Foundation and American Society of Nephrology (ASN) Task Force recommended calculation based on the Chronic Kidney Disease Epidemiology Collaboration (CKD-EPI) equation refit without adjustment for race.       Narrative:      GFR Normal >60  Chronic Kidney Disease <60  Kidney Failure <15      Protime-INR [[id number]]  (Normal) Collected: 08/15/22 0126    Specimen: Blood Updated: 08/15/22 0157     Protime 13.2 Seconds      INR 1.01    aPTT [[id number]]  (Normal) Collected: 08/15/22 0126    Specimen: Blood Updated: 08/15/22 0157     PTT 27.9 seconds     CBC Auto Differential [[id number]]  (Normal) Collected: 08/15/22 0126    Specimen: Blood Updated: 08/15/22 0138     WBC 7.64 10*3/mm3      RBC 4.26 10*6/mm3      Hemoglobin 12.5 g/dL      Hematocrit 39.0 %      MCV 91.5 fL      MCH 29.3 pg      MCHC 32.1 g/dL      RDW 12.5 %      RDW-SD 41.7 fl      MPV 10.1 fL      Platelets 282 10*3/mm3      Neutrophil % 51.8 %      Lymphocyte % 33.4 %      Monocyte % 10.6 %      Eosinophil % 3.1 %      Basophil % 0.8 %      Immature Grans % 0.3 %      Neutrophils, Absolute 3.96 10*3/mm3      Lymphocytes, Absolute 2.55 10*3/mm3      Monocytes, Absolute 0.81 10*3/mm3      Eosinophils, Absolute 0.24 10*3/mm3      Basophils, Absolute  0.06 10*3/mm3      Immature Grans, Absolute 0.02 10*3/mm3      nRBC 0.1 /100 WBC     COVID PRE-OP / PRE-PROCEDURE SCREENING ORDER (NO ISOLATION) - Swab, Nasopharynx [[id number]]  (Normal) Collected: 08/15/22 0127    Specimen: Swab from Nasopharynx Updated: 08/15/22 0212    Narrative:      The following orders were created for panel order COVID PRE-OP / PRE-PROCEDURE SCREENING ORDER (NO ISOLATION) - Swab, Nasopharynx.  Procedure                               Abnormality         Status                     ---------                               -----------         ------                     COVID-19,[facility]...[[id number]]  Normal              Final result                 Please view results for these tests on the individual orders.    COVID-19,[facility] CEPHEID/ACE NP SWAB IN TRANSPORT MEDIA 8-12 HR TAT - Swab, Nasopharynx [[id number]]  (Normal) Collected: 08/15/22 0127    Specimen: Swab from Nasopharynx Updated: 08/15/22 0212     COVID19 Not Detected    Narrative:      Fact sheet for providers: https://www.fda.gov/media/032005/download     Fact sheet for patients: https://www.fda.gov/media/339831/download          Imaging Results (Last 24 Hours)     Procedure Component Value Units Date/Time    CT Head Without Contrast [[id number]] Resulted: 08/15/22 0933     Updated: 08/15/22 0935    CT Head Without Contrast [[id number]] Collected: 08/15/22 0050     Updated: 08/15/22 0103    Addenda:        ADDENDUM:  08 15 22 01:02 Call Doctor Regarding Intracranial Hemorrhage, called Dr. Horton on 08 15 01:02 (-04:00)      Signed: 08/15/22 0049 by Ever Almonte MD    Narrative:      CR  Patient: BETH PERSON  Time Out: 00:49  Exam(s): CT HEAD Without Contrast     EXAM:    CT Head Without Intravenous Contrast    CLINICAL HISTORY:     Reason for exam: Head trauma, minor (Age >= 65y).    TECHNIQUE:    Axial computed tomography images of the head brain without intravenous   contrast.  CTDI is 54.8 mGy and DLP is 1056.6  mGy-cm.  This CT exam was   performed according to the principle of ALARA (As Low As Reasonably   Achievable) by using one or more of the following dose reduction   techniques: automated exposure control, adjustment of the mA and or kV   according to patient size, and or use of iterative reconstruction   technique.    COMPARISON:    No relevant prior studies available.    FINDINGS:    Brain:  Slight hyperdensity along the right central sulcus seen on   series 2 images #40 and 41 suspicious for a trace amount of subarachnoid   hemorrhage.  Trace to mild periventricular deep white matter low density   consistent with chronic small vessel disease and or senescent changes.    No evidence of acute large vessel infarct.    Ventricles:  Unremarkable.  No ventriculomegaly.    Bones joints:  See below.      Soft tissues:  Small right forehead scalp hematoma.  No skull fracture   is seen.    Sinuses:  Unremarkable as visualized.  No acute sinusitis.    Mastoid air cells:  Unremarkable as visualized.  No mastoid effusion.    IMPRESSION:       1.  Slight hyperdensity along the right central sulcus seen on series 2   images #40 and 41 suspicious for a trace amount of subarachnoid   hemorrhage.  2.  Small right forehead scalp hematoma.  No skull fracture is seen.  3.  Trace to mild periventricular deep white matter low density   consistent with chronic small vessel disease and or senescent changes.    No evidence of acute large vessel infarct.      Impression:            Communications:     Call Doctor Intracranial Hemorrhage    Electronically signed by Ever Almonte MD on 08-15-22 at 0049    XR Knee 1 or 2 View Right [[id number]] Collected: 08/14/22 2359     Updated: 08/15/22 0003    Narrative:      XR KNEE 1 OR 2 VW RIGHT-  08/14/2022     HISTORY: Fell. Knee pain.     There is moderate medial tibiofemoral joint space narrowing. There  appears to be a small loose body projecting over the lateral  tibiofemoral joint.     No  fractures are seen. Mild hypertrophic changes are seen in the knee.       Impression:      1. Mild to moderate degenerative arthritis of the right knee.  2. Small smoothly marginated lucent body.  3. No acute process identified.     This report was finalized on 8/15/2022 12:00 AM by Dr. Sean Fuenz M.D.                 No orders to display        Assessment/Plan     Active Hospital Problems    Diagnosis  POA   • **Subarachnoid hemorrhage (HCC) [I60.9]  Yes   • Right knee pain [M25.561]  Yes   • C. difficile colitis [A04.72]  Yes   • Essential hypertension [I10]  Yes   • Diverticulosis of large intestine [K57.30]  Yes      Resolved Hospital Problems   No resolved problems to display.       Ms. Hoyt is a 72 y.o. former smoker with a history of HTN, depression, C. Diff who presented to [facility] [place], operated by [facility] ER for chief complaint of fall & head injury & admitted for subarachnoid hemorrhage.       Subarachnoid hemorrhage (HCC): No evidence of lateralizing features on exam to suggest acute neurological event such as CVA.  Consult neurosurgery.  Repeat CT head.      C. difficile colitis:  Denies diarrhea at present.  Continue oral vancomycin & request RN verify stop date.        Diverticulosis of large intestine:  Denies abdominal pain.       Essential hypertension:   Control BP with home regimen--losartan 100 mg PO daily & hydralazine 50 mg PO daily.  Defer to [facility] for BP parameters.      Right knee pain:  No evidence of fracture on radiograph.  Symptom mgmt.  Avoid sedation.       · I discussed the patient's findings and my recommendations with patient, , RN, & Dr. Meek.     VTE Prophylaxis - SCDs.  Code Status - Full code.       ALDO Browning  [facility]  08/15/22  09:41 EDT

## 2022-08-15 NOTE — ED PROVIDER NOTES
EMERGENCY DEPARTMENT ENCOUNTER    CHIEF COMPLAINT  Chief Complaint: Fall with head trauma  History given by: Patient  History limited by: None  Room Number: E551/1  PMD: Josie Youssef MD      HPI:  Pt is a 72 y.o. female who presents complaining of a head injury that occurred at her neighbors home approximately 2.5 hours ago.  The patient reports that she was walking in her neighbors driveway when she tripped over the ramp of a [employer] truck and struck her head on the neighbors storm door.  She denies loss of consciousness, headache, neck pain, or blurry vision.  She does state as well that she fell onto her right knee and injured it as well.  She denies taking any blood thinning medication.    Duration: Occurred approximately 2.5 hours ago  Onset: Sudden  Location: Head/knee  Radiation: None  Quality: No pain, swelling only  Intensity/Severity: Mild  Progression: Worsening  Associated Symptoms: None  Aggravating Factors: Touch/movement  Alleviating Factors: None  Previous Episodes: None  Treatment before arrival: None    PAST MEDICAL HISTORY  Active Ambulatory Problems     Diagnosis Date Noted   • Diverticulosis of large intestine 02/05/2018   • Essential hypertension 04/23/2019   • Anxiety 04/23/2019   • C. 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    Dementia Mother    • Breast cancer Mother    • Tuberculosis Father    • Breast cancer Sister    • Mental illness Sister         Family history of suicide   • Prostate cancer Brother        SOCIAL HISTORY  Social History     Socioeconomic History   • Marital status:    Tobacco Use   • Smoking status: Former Smoker     Packs/day: 1.00     Years: 40.00     Pack years: 40.00   • Smokeless tobacco: Never Used   • Tobacco comment: quit approx 2009   Substance and Sexual Activity   • Alcohol use: Yes     Comment: rarely   • Drug use: No       ALLERGIES  Erythromycin    REVIEW OF SYSTEMS  Review of Systems   Constitutional: Negative for fever.   HENT: Negative for sore throat.    Eyes: Negative.    Respiratory: Negative for cough and shortness of breath.    Cardiovascular: Negative for chest pain.   Gastrointestinal: Negative for abdominal pain, diarrhea and vomiting.   Genitourinary: Negative for dysuria.   Musculoskeletal: Negative for neck pain.        Right knee pain   Skin: Positive for wound. Negative for rash.        Head injury   Allergic/Immunologic: Negative.    Neurological: Negative for weakness, numbness and headaches.   Hematological: Negative.    Psychiatric/Behavioral: Negative.    All other systems reviewed and are negative.      PHYSICAL EXAM  ED Triage Vitals [08/14/22 2131]   Temp Heart Rate Resp BP SpO2   99.6 °F (37.6 °C) 97 16 147/93 93 %      Temp src Heart Rate Source Patient Position BP Location FiO2 (%)   -- -- -- -- --       Physical Exam  Vitals and nursing note reviewed.   Constitutional:       General: She is not in acute distress.  HENT:      Head: Normocephalic.      Comments: Small right-sided forehead abrasion with hematoma present  Eyes:      Pupils: Pupils are equal, round, and reactive to light.   Cardiovascular:      Rate and Rhythm: Normal rate and regular rhythm.      Heart sounds: Normal heart sounds.   Pulmonary:      Effort: Pulmonary effort is normal. No respiratory  distress.      Breath sounds: Normal breath sounds.   Abdominal:      Palpations: Abdomen is soft.      Tenderness: There is no abdominal tenderness. There is no guarding or rebound.   Musculoskeletal:         General: Normal range of motion.      Cervical back: Normal range of motion and neck supple.   Skin:     General: Skin is warm and dry.      Findings: No rash.   Neurological:      Mental Status: She is alert and oriented to person, place, and time.      Sensory: Sensation is intact.   Psychiatric:         Mood and Affect: Mood and affect normal.         LAB RESULTS  Lab Results (last 24 hours)     Procedure Component Value Units Date/Time    CBC & Differential [[id number]]  (Normal) Collected: 08/15/22 0126    Specimen: Blood Updated: 08/15/22 0138    Narrative:      The following orders were created for panel order CBC & Differential.  Procedure                               Abnormality         Status                     ---------                               -----------         ------                     CBC Auto Differential[[id number]]        Normal              Final result                 Please view results for these tests on the individual orders.    Comprehensive Metabolic Panel [[id number]]  (Abnormal) Collected: 08/15/22 0126    Specimen: Blood Updated: 08/15/22 0155     Glucose 100 mg/dL      BUN 19 mg/dL      Creatinine 0.73 mg/dL      Sodium 141 mmol/L      Potassium 4.2 mmol/L      Chloride 104 mmol/L      CO2 28.2 mmol/L      Calcium 9.7 mg/dL      Total Protein 6.5 g/dL      Albumin 4.40 g/dL      ALT (SGPT) 16 U/L      AST (SGOT) 20 U/L      Alkaline Phosphatase 83 U/L      Total Bilirubin 1.0 mg/dL      Globulin 2.1 gm/dL      A/G Ratio 2.1 g/dL      BUN/Creatinine Ratio 26.0     Anion Gap 8.8 mmol/L      eGFR 87.5 mL/min/1.73      Comment: National Kidney Foundation and American Society of Nephrology (ASN) Task Force recommended calculation based on the Chronic Kidney Disease Epidemiology  Collaboration (CKD-EPI) equation refit without adjustment for race.       Narrative:      GFR Normal >60  Chronic Kidney Disease <60  Kidney Failure <15      Protime-INR [[id number]]  (Normal) Collected: 08/15/22 0126    Specimen: Blood Updated: 08/15/22 0157     Protime 13.2 Seconds      INR 1.01    aPTT [[id number]]  (Normal) Collected: 08/15/22 0126    Specimen: Blood Updated: 08/15/22 0157     PTT 27.9 seconds     CBC Auto Differential [[id number]]  (Normal) Collected: 08/15/22 0126    Specimen: Blood Updated: 08/15/22 0138     WBC 7.64 10*3/mm3      RBC 4.26 10*6/mm3      Hemoglobin 12.5 g/dL      Hematocrit 39.0 %      MCV 91.5 fL      MCH 29.3 pg      MCHC 32.1 g/dL      RDW 12.5 %      RDW-SD 41.7 fl      MPV 10.1 fL      Platelets 282 10*3/mm3      Neutrophil % 51.8 %      Lymphocyte % 33.4 %      Monocyte % 10.6 %      Eosinophil % 3.1 %      Basophil % 0.8 %      Immature Grans % 0.3 %      Neutrophils, Absolute 3.96 10*3/mm3      Lymphocytes, Absolute 2.55 10*3/mm3      Monocytes, Absolute 0.81 10*3/mm3      Eosinophils, Absolute 0.24 10*3/mm3      Basophils, Absolute 0.06 10*3/mm3      Immature Grans, Absolute 0.02 10*3/mm3      nRBC 0.1 /100 WBC     COVID PRE-OP / PRE-PROCEDURE SCREENING ORDER (NO ISOLATION) - Swab, Nasopharynx [[id number]]  (Normal) Collected: 08/15/22 0127    Specimen: Swab from Nasopharynx Updated: 08/15/22 0212    Narrative:      The following orders were created for panel order COVID PRE-OP / PRE-PROCEDURE SCREENING ORDER (NO ISOLATION) - Swab, Nasopharynx.  Procedure                               Abnormality         Status                     ---------                               -----------         ------                     COVID-19,[facility] IN-HOUSE...[[id number]]  Normal              Final result                 Please view results for these tests on the individual orders.    COVID-19,[facility] IN-HOUSE CEPHEID/ACE NP SWAB IN TRANSPORT MEDIA 8-12 HR TAT - Swab, Nasopharynx  [[id number]]  (Normal) Collected: 08/15/22 0127    Specimen: Swab from Nasopharynx Updated: 08/15/22 0212     COVID19 Not Detected    Narrative:      Fact sheet for providers: https://www.fda.gov/media/052749/download     Fact sheet for patients: https://www.fda.gov/media/942163/download          I ordered the above labs and reviewed the results    RADIOLOGY  CT Head Without Contrast   Final Result   Addendum 1 of 1   ADDENDUM:   08 15 22 01:02 Call Doctor Regarding Intracranial Hemorrhage, called Dr. Horton on 08 15 01:02 (-04:00)            Final            Communications:       Call Doctor Intracranial Hemorrhage      Electronically signed by Ever Almonte MD on 08-15-22 at 0049      XR Knee 1 or 2 View Right   Final Result   1. Mild to moderate degenerative arthritis of the right knee.   2. Small smoothly marginated lucent body.   3. No acute process identified.       This report was finalized on 8/15/2022 12:00 AM by Dr. Sean Funez M.D.          CT Head Without Contrast    (Results Pending)        I ordered the above noted radiological studies. Interpreted by radiologist.  Reviewed by me in PACS.       PROCEDURES  Procedures      PROGRESS AND CONSULTS     The patient was wearing a facemask upon entrance into the room and remained in such throughout their visit.  I was wearing PPE including a facemask, eye protection, as well as gloves at any point entering the room and throughout the visit    0055  On reevaluation, the patient is resting comfortably and continues to be without complaint.  Vital signs are stable.  I did inform the patient that her CT scan of her head shows a tiny amount of probable subarachnoid hemorrhage.  We will admit her to the \[facility]\"" for repeat head CT as well as assessment by neurosurgery.  The patient is in agreement with the plan and all questions have been answered.    0105  Case discussed with ALDO Brewster for [facility], who agrees to admit the patient to the  [facility] for Dr. Potter.    MEDICAL DECISION MAKING  Results were reviewed/discussed with the patient and they were also made aware of online access. Pt also made aware that some labs, such as cultures, will not be resulted during ER visit and follow up with PMD is necessary.     MDM  Number of Diagnoses or Management Options     Amount and/or Complexity of Data Reviewed  Clinical lab tests: ordered and reviewed  Tests in the radiology section of CPT®: ordered and reviewed  Review and summarize past medical records: yes (Upon medical records review, the patient was last seen and evaluated on 2/5/2018 by general surgery for colonoscopy)  Discuss the patient with other providers: yes (ALDO Brewster for [facility], who will admit the patient for Dr. Potter)  Independent visualization of images, tracings, or specimens: yes (CT scan of the head showing a probable small area of subarachnoid hemorrhage)           DIAGNOSIS  Final diagnoses:   Subarachnoid hemorrhage (HCC)   Fall from standing, initial encounter   Contusion of right knee, initial encounter       DISPOSITION  DISCHARGE    Patient discharged in stable condition.    Reviewed implications of results, diagnosis, meds, responsibility to follow up, warning signs and symptoms of possible worsening, potential complications and reasons to return to ER.    Patient/Family voiced understanding of above instructions.    Discussed plan for discharge, as there is no emergent indication for admission. Patient referred to primary care provider for BP management due to today's BP. Pt/family is agreeable and understands need for follow up and repeat testing.  Pt is aware that discharge does not mean that nothing is wrong but it indicates no emergency is present that requires admission and they must continue care with follow-up as given below or physician of their choice.     FOLLOW-UP  No follow-up provider specified.       Medication List      No changes were made to your  prescriptions during this visit.           Latest Documented Vital Signs:  As of 07:03 EDT  BP- 164/85 HR- 78 Temp- 97.6 °F (36.4 °C) (Oral) O2 sat- 95%         Alec Horton MD  08/15/22 0703

## 2022-08-15 NOTE — ED TRIAGE NOTES
To ER via PV.  Pt tripped over ramp of [employer] landing on knees and striking forehead on storm door.  Small lac to forehead with hematoma.  - LOC    Pt in mask at time of triage.  Triage staff in appropriate PPE.

## 2022-08-15 NOTE — CASE MANAGEMENT/SOCIAL WORK
Discharge Planning Assessment  [facility]     Patient Name: Karolina Hoyt  MRN: [id number]  Today's Date: 8/15/2022    Admit Date: 8/14/2022     Discharge Needs Assessment     Row Name 08/15/22 1035       Living Environment    People in Home spouse    Current Living Arrangements home    Primary Care Provided by self    Provides Primary Care For no one    Family Caregiver if Needed spouse    Quality of Family Relationships helpful;involved    Able to Return to Prior Arrangements yes       Resource/Environmental Concerns    Resource/Environmental Concerns none       Transition Planning    Patient/Family Anticipates Transition to home with family    Patient/Family Anticipated Services at Transition none    Transportation Anticipated family or friend will provide       Discharge Needs Assessment    Readmission Within the Last 30 Days no previous admission in last 30 days    Equipment Currently Used at Home none    Concerns to be Addressed no discharge needs identified;denies needs/concerns at this time    Anticipated Changes Related to Illness none    Equipment Needed After Discharge none    Provided Post Acute Provider List? N/A               Discharge Plan     Row Name 08/15/22 1036       Plan    Plan plans home; denies d/c needs    Patient/Family in Agreement with Plan yes    Plan Comments Spoke with pt and pt's spouse bedside. Confirmed facesheet correct. Pt lives with her spouse. She is IADLs no DME used. Pt still drives. Her PCP is Dr. Youssef and she uses [facility] on [address] with no issues. pt reports she has never used HH or SNF in past. Pt plans home with spouse at d/c and denies current d/c needs. CCP to follow.              Continued Care and Services - Admitted Since 8/14/2022    Coordination has not been started for this encounter.          Demographic Summary     Row Name 08/15/22 1035       General Information    Admission Type inpatient    Arrived From home    Required Notices Provided Important  Message from Medicare    Reason for Consult discharge planning    Preferred Language English               Functional Status     Row Name 08/15/22 1035       Functional Status    Usual Activity Tolerance good    Current Activity Tolerance good       Functional Status, IADL    Medications independent    Meal Preparation independent    Housekeeping independent    Laundry independent    Shopping independent               Psychosocial    No documentation.                Abuse/Neglect    No documentation.                Legal    No documentation.                Substance Abuse    No documentation.                Patient Forms    No documentation.                   EL Al

## 2022-08-15 NOTE — CONSULTS
[facility] NEUROSURGERY CONSULT NOTE    Patient name: Karolina Hoyt  Referring Provider: ALDO Brewster  Reason for Consultation: SAH    Patient Care Team:  Josie Youssef MD as PCP - General (Internal Medicine)  Emelyn Bradshaw MD as Surgeon (General Surgery)  Leydi Guillaume MD as Consulting Physician (Psychiatry)  Yaa Ingram MD as Consulting Physician (Obstetrics and Gynecology)  Shruti Ibrahim APRN as Nurse Practitioner (Gastroenterology)    Chief complaint: fall 8/14    Subjective .     History of present illness:    Patient is a 72 y.o.  female presented to ER after mechanical fall in her neighbors yard yesterday.  There was a moving truck in place and she tripped over something nearby falling forward hitting her head on the storm door.  Then she fell onto her right knee.  She denies any associated loss of consciousness visual changes nausea or vomiting.  She states she was a little bit dizzy after the fall but that resolved quickly.  Her neighbor and another friend helped her to her feet at home.  She came to the ER for further evaluation.  She reports no headache.  She has some chronic neck pain that is both lateral and posterior.  It is not worse than baseline.  No associated numbness tingling or arm pain.  She denies any frequent issues with imbalance.  Her daughter tells me she is currently under work-up for neurocognitive issues.  CT on presentation showed small area of possible subarachnoid hemorrhage.  She is not on any blood thinners.  She is a remote smoker.  No routine alcohol use.  She reports melanoma of her skin many years ago.  No prior head or cervical spine surgery.    Review of Systems  Review of Systems   Constitutional: Negative for fever.   Eyes: Negative for visual disturbance.   Gastrointestinal: Positive for diarrhea.   Genitourinary: Negative for enuresis.   Musculoskeletal: Positive for neck pain ( chronic). Negative for gait problem.   Neurological: Negative for  seizures, weakness and headaches.   Psychiatric/Behavioral: Positive for confusion.       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-   • Tachycardia        PAST SURGICAL HISTORY  Past Surgical History:   Procedure Laterality Date   • COLONOSCOPY N/A 2/5/2018    Procedure: COLONOSCOPY TO CECUM;  Surgeon: Emelyn Bradshaw MD;  Location: [facility] ENDOSCOPY;  Service:    • HYSTERECTOMY     • THYROID LOBECTOMY      approx 1999 or 2000   • THYROIDECTOMY, PARTIAL  2002    Sub-Total Thyroidectomy       FAMILY HISTORY  Family History   Problem Relation Age of Onset   • Dementia Mother    • Breast cancer Mother    • Tuberculosis Father    • Breast cancer Sister    • Mental illness Sister         Family history of suicide   • Prostate cancer Brother        SOCIAL HISTORY  Social History     Tobacco Use   • Smoking status: Former Smoker     Packs/day: 1.00     Years: 40.00     Pack years: 40.00   • Smokeless tobacco: Never Used   • Tobacco comment: quit approx 2009   Substance Use Topics   • Alcohol use: Yes     Comment: rarely   • Drug use: No       retired  Lives with ; daughter is NP with ortho group    Allergies:  Erythromycin    MEDICATIONS:  Medications Prior to Admission   Medication Sig Dispense Refill Last Dose   • busPIRone (BUSPAR) 10 MG tablet Take 1 tablet by mouth 2 (Two) Times a Day. 180 tablet 1 8/14/2022 at Unknown time   • desvenlafaxine (PRISTIQ) 50 MG 24 hr tablet Take 50 mg by mouth Every Morning.   8/14/2022 at Unknown time   • hydrALAZINE (APRESOLINE) 50 MG tablet TAKE 1 TABLET TWICE A DAY (Patient taking differently: 50 mg Daily. Patient states once a day, at noon) 180 tablet 1 Patient Taking Differently at Unknown time   • losartan (COZAAR) 100 MG tablet TAKE 1 TABLET BY MOUTH DAILY 90 tablet 1 8/14/2022 at Unknown time    • vancomycin (VANCOCIN) 125 MG capsule Take 1 capsule by mouth 4 (Four) Times a Day. 40 capsule 0 Unknown at Unknown time       Current Facility-Administered Medications:   •  acetaminophen (TYLENOL) tablet 650 mg, 650 mg, Oral, Q4H PRN **OR** acetaminophen (TYLENOL) 160 MG/5ML solution 650 mg, 650 mg, Oral, Q4H PRN **OR** acetaminophen (TYLENOL) suppository 650 mg, 650 mg, Rectal, Q4H PRN, Aliyah Thomason APRN  •  busPIRone (BUSPAR) tablet 10 mg, 10 mg, Oral, BID, Jignesh Berry APRN, 10 mg at 08/15/22 1145  •  calcium carbonate (TUMS) chewable tablet 500 mg (200 mg elemental), 2 tablet, Oral, BID PRN, Aliyah Thomason APRN  •  desvenlafaxine (PRISTIQ) 24 hr tablet 50 mg, 50 mg, Oral, QAM, Jignesh Berry APRN, 50 mg at 08/15/22 1145  •  hydrALAZINE (APRESOLINE) tablet 50 mg, 50 mg, Oral, Daily, Jignesh Berry APRN, 50 mg at 08/15/22 1145  •  losartan (COZAAR) tablet 100 mg, 100 mg, Oral, Daily, Jignesh Berry APRN, 100 mg at 08/15/22 1145  •  ondansetron (ZOFRAN) tablet 4 mg, 4 mg, Oral, Q6H PRN **OR** ondansetron (ZOFRAN) injection 4 mg, 4 mg, Intravenous, Q6H PRN, Aliyah Thomason APRN  •  [COMPLETED] Insert peripheral IV, , , Once **AND** sodium chloride 0.9 % flush 10 mL, 10 mL, Intravenous, PRN, Alec Horton MD  •  sodium chloride 0.9 % flush 10 mL, 10 mL, Intravenous, Q12H, Aliyah Thomason APRN, 10 mL at 08/15/22 0850  •  sodium chloride 0.9 % flush 10 mL, 10 mL, Intravenous, PRN, Aliyah Thomason APRN  •  vancomycin (VANCOCIN) capsule 125 mg, 125 mg, Oral, 4x Daily, Jignesh Berry, APRN, 125 mg at 08/15/22 1145    Objective     Results Review:  LABS:  Results from last 7 days   Lab Units 08/15/22  0126   WBC 10*3/mm3 7.64   HEMOGLOBIN g/dL 12.5   HEMATOCRIT % 39.0   PLATELETS 10*3/mm3 282     Results from last 7 days   Lab Units 08/15/22  0126   SODIUM mmol/L 141   POTASSIUM mmol/L 4.2   CHLORIDE mmol/L 104   CO2 mmol/L 28.2   BUN mg/dL 19   CREATININE mg/dL 0.73    CALCIUM mg/dL 9.7   BILIRUBIN mg/dL 1.0   ALK PHOS U/L 83   ALT (SGPT) U/L 16   AST (SGOT) U/L 20   GLUCOSE mg/dL 100*     Results from last 7 days   Lab Units 08/15/22  0126   INR  1.01     COVID 8/15-not detected    DIAGNOSTICS:  CTH-Small amount of hyperdense fluid right central sulcus c/w small subarachnoid hemorrhage.  Right scalp hematoma.    Results Review:   I reviewed the patient's new clinical results.  I personally viewed and interpreted the patient's CT head.  Also reviewed by discussed with Dr. Pimentel    Vital Signs   Temp:  [97.4 °F (36.3 °C)-99.6 °F (37.6 °C)] 98.4 °F (36.9 °C)  Heart Rate:  [65-97] 69  Resp:  [15-16] 16  BP: (138-164)/(77-93) 146/89    Physical Exam:  Physical Exam  Vitals reviewed.   Constitutional:       Appearance: Normal appearance.   HENT:      Head:      Comments: Small abrasion right forehead just above her brow  Pulmonary:      Effort: Pulmonary effort is normal.   Musculoskeletal:      Cervical back: No tenderness or bony tenderness. Pain with movement present. Decreased range of motion ( Mild particularly with rotation).   Skin:     General: Skin is warm and dry.   Neurological:      General: No focal deficit present.      Mental Status: She is alert and oriented to person, place, and time.      Coordination: Finger-Nose-Finger Test and Romberg Test normal.      Deep Tendon Reflexes: Strength normal.   Psychiatric:         Mood and Affect: Mood normal.         Speech: Speech normal.         Behavior: Behavior normal. Behavior is cooperative.      Comments:   She answers questions appropriately but does look to her  to help her at times.       Neurologic Exam     Mental Status   Oriented to person, place, and time.   Attention: normal. Concentration: decreased (Easily distracted).   Speech: speech is normal   Level of consciousness: alert  General knowledge: Fair.   Normal comprehension.     Cranial Nerves   Cranial nerves II through XII intact.     Motor Exam    Muscle bulk: normal  Right arm pronator drift: absent  Left arm pronator drift: absent    Strength   Strength 5/5 throughout.     Sensory Exam   Right arm light touch: normal  Left arm light touch: normal    Gait, Coordination, and Reflexes     Coordination   Romberg: negative  Finger to nose coordination: normal    Reflexes   Right Arroyo: absent  Left Arroyo: absent  Right ankle clonus: absent  Left ankle clonus: absent      Assessment & Plan       Subarachnoid hemorrhage (HCC)    Diverticulosis of large intestine    Essential hypertension    C. difficile colitis    Right knee pain    Patient presented after mechanical fall.  CT head showed some hyperdensity in the right central sulcus possibly consistent with traumatic subarachnoid hemorrhage.  Repeat CT stable today.  Patient is not on any anticoagulants or antiplatelets at home.  She does have some chronic neck pain.  She denies any worsening of her chronic symptoms but she is somewhat uncomfortable with turning her neck.  CT cervical will be obtained.  She has no focal neurologic deficits on exam outside of some mild cognitive issues.  She is able to answer questions appropriately, but she has some difficulty with historical information in terms to her  for assistance.  Her daughter, Nayla, tells me that she does have some neurocognitive issues that are being evaluated as an outpatient.  With regard to the possible subarachnoid hemorrhage, no further imaging is needed while inpatient, we will plan outpatient follow-up 1 month with CT.  Recommend no antiplatelets or anticoagulants x1 week.  I will addend the note after CT cervical complete    PLAN:   CT cervical if unremarkable, OK for DC  F/u JO[facility]O 1 month with [facility]  No AC/AP x 1 week    I discussed the patient's findings and my recommendations with patient, family and Dr. Pimentel     CT cervical-generative changes but no evidence of acute abnormality specifically no fractures.  Incidental finding of  "nodular left thyroid.  Defer further work-up recommendations to primary service.  Patient cleared from our standpoint for discharge and follow-up in 1 month.    Frida Mcdonnell, APRN  08/15/22  14:50 EDT    \"Dictated utilizing Dragon dictation\".      "

## 2022-08-15 NOTE — ED NOTES
"Nursing report ED to floor  Karolina Hoyt  72 y.o.  female    HPI :   Chief Complaint   Patient presents with   • Fall   • Head Injury       Admitting doctor:   Ti Potter MD    Admitting diagnosis:   The primary encounter diagnosis was Subarachnoid hemorrhage (HCC). Diagnoses of Fall from standing, initial encounter and Contusion of right knee, initial encounter were also pertinent to this visit.    Code status:   Current Code Status     Date Active Code Status Order ID Comments User Context       8/15/2022 0108 CPR (Attempt to Resuscitate) [id number]  Aliyah Thomason APRN ED     Advance Care Planning Activity      Questions for Current Code Status     Question Answer    Code Status (Patient has no pulse and is not breathing) CPR (Attempt to Resuscitate)    Medical Interventions (Patient has pulse or is breathing) Full Support          Allergies:   Erythromycin    Intake and Output  No intake or output data in the 24 hours ending 08/15/22 0246    Weight:       08/14/22  2348   Weight: 63.5 kg (140 lb)       Most recent vitals:   Vitals:    08/14/22 2131 08/14/22 2348 08/15/22 0134   BP: 147/93  138/77   Pulse: 97  65   Resp: 16  16   Temp: 99.6 °F (37.6 °C)     SpO2: 93%  98%   Weight:  63.5 kg (140 lb)    Height: 167.6 cm (66\") 167.6 cm (66\")        Active LDAs/IV Access:   Lines, Drains & Airways     Active LDAs     None                Labs (abnormal labs have a star):   Labs Reviewed   COMPREHENSIVE METABOLIC PANEL - Abnormal; Notable for the following components:       Result Value    Glucose 100 (*)     BUN/Creatinine Ratio 26.0 (*)     All other components within normal limits    Narrative:     GFR Normal >60  Chronic Kidney Disease <60  Kidney Failure <15     COVID-19,[facility] IN-HOUSE CEPHEID/ACE, NP SWAB IN TRANSPORT MEDIA 8-12 HR TAT - Normal    Narrative:     Fact sheet for providers: https://www.fda.gov/media/996962/download     Fact sheet for patients: " https://www.fda.gov/media/801786/download   PROTIME-INR - Normal   APTT - Normal   CBC WITH AUTO DIFFERENTIAL - Normal   COVID PRE-OP / PRE-PROCEDURE SCREENING ORDER (NO ISOLATION)    Narrative:     The following orders were created for panel order COVID PRE-OP / PRE-PROCEDURE SCREENING ORDER (NO ISOLATION) - Swab, Nasopharynx.  Procedure                               Abnormality         Status                     ---------                               -----------         ------                     COVID-19,[facility] IN-HOUSE...[[id number]]  Normal              Final result                 Please view results for these tests on the individual orders.   CBC AND DIFFERENTIAL    Narrative:     The following orders were created for panel order CBC & Differential.  Procedure                               Abnormality         Status                     ---------                               -----------         ------                     CBC Auto Differential[[id number]]        Normal              Final result                 Please view results for these tests on the individual orders.       EKG:   No orders to display       Meds given in ED:   Medications   sodium chloride 0.9 % flush 10 mL (has no administration in time range)   sodium chloride 0.9 % flush 10 mL (has no administration in time range)   sodium chloride 0.9 % flush 10 mL (has no administration in time range)   acetaminophen (TYLENOL) tablet 650 mg (has no administration in time range)     Or   acetaminophen (TYLENOL) 160 MG/5ML solution 650 mg (has no administration in time range)     Or   acetaminophen (TYLENOL) suppository 650 mg (has no administration in time range)   ondansetron (ZOFRAN) tablet 4 mg (has no administration in time range)     Or   ondansetron (ZOFRAN) injection 4 mg (has no administration in time range)   calcium carbonate (TUMS) chewable tablet 500 mg (200 mg elemental) (has no administration in time range)       Imaging results:  XR Knee 1  or 2 View Right    Result Date: 8/15/2022  1. Mild to moderate degenerative arthritis of the right knee. 2. Small smoothly marginated lucent body. 3. No acute process identified.  This report was finalized on 8/15/2022 12:00 AM by Dr. Sean Funez M.D.      CT Head Without Contrast    Addendum Date: 8/15/2022    ADDENDUM: 08 15 22 01:02 Call Doctor Regarding Intracranial Hemorrhage, called Dr. Horton on 08 15 01:02 (-04:00)     Result Date: 8/15/2022  Communications:  Call Doctor Intracranial Hemorrhage Electronically signed by Ever Almonte MD on 08-15-22 at 0049      Ambulatory status:   - stand by assist    Social issues:   Social History     Socioeconomic History   • Marital status:    Tobacco Use   • Smoking status: Former Smoker     Packs/day: 1.00     Years: 40.00     Pack years: 40.00   • Smokeless tobacco: Never Used   • Tobacco comment: quit approx 2009   Substance and Sexual Activity   • Alcohol use: Yes     Comment: rarely   • Drug use: No       NIH Stroke Scale:        Nursing report ED to floor:

## 2022-08-15 NOTE — PLAN OF CARE
Goal Outcome Evaluation:  Plan of Care Reviewed With: patient        Progress: improving  Outcome Evaluation: Vitals stable. On room air. Pt up ad merna in room. Contact spore precautions in place for Cdiff infection. Pt on oral vancomycin. CT of head and cervial spine completed during shift. Possible d/c tomorrow.

## 2022-08-16 NOTE — TELEPHONE ENCOUNTER
----- Message from ALDO Ramsey sent at 8/15/2022  1:51 PM EDT -----  Regarding: f/u  Patient with small traumatic subarachnoid hemorrhage. Needs f/u 1 month with C[facility].

## 2022-08-16 NOTE — OUTREACH NOTE
Call Center TCM Note    Flowsheet Row Responses   [facility] patient discharged from? [place]   Does the patient have one of the following disease processes/diagnoses(primary or secondary)? Other   TCM attempt successful? Yes   Call start time 1109   Call end time 1117   Discharge diagnosis subarachnoid hemorrhage d/t fall with head injury   Meds reviewed with patient/caregiver? Yes   Does the patient have all medications ordered at discharge? N/A   Is the patient taking all medications as directed (includes completed medication regime)? Yes   Does the patient have a primary care provider?  Yes   Does the patient have an appointment with their PCP within 7 days of discharge? No   What is preventing the patient from scheduling follow up appointments within 7 days of discharge? Haven't had time   Has the patient kept scheduled appointments due by today? N/A   Comments No appts available in the TCM time frame and will message office for an appt.   Psychosocial issues? No   Did the patient receive a copy of their discharge instructions? Yes   Nursing interventions Reviewed instructions with patient   What is the patient's perception of their health status since discharge? Improving   Is the patient/caregiver able to teach back signs and symptoms related to disease process for when to call PCP? Yes   Is the patient/caregiver able to teach back signs and symptoms related to disease process for when to call 911? Yes   Is the patient/caregiver able to teach back the hierarchy of who to call/visit for symptoms/problems? PCP, Specialist, Home health nurse, Urgent Care, ED, 911 Yes   If the patient is a current smoker, are they able to teach back resources for cessation? Not a smoker   Additional teach back comments States she is sore and bruised but she is doing ok.            Kathy Levin LPN    8/16/2022, 11:18 EDT

## 2022-08-16 NOTE — TELEPHONE ENCOUNTER
I spoke to patient gave her follow up information for CT scan and follow up with Dr. Pimentel. Will also mail reminders .

## 2022-08-16 NOTE — CASE MANAGEMENT/SOCIAL WORK
Case Management Discharge Note      Final Note: Pt discharged home, no known needs.  RONNA Jones RN    Provided Post Acute Provider List?: N/A    Selected Continued Care - Discharged on 8/15/2022 Admission date: 8/14/2022 - Discharge disposition: Home or Self Care    Destination    No services have been selected for the patient.              Durable Medical Equipment    No services have been selected for the patient.              Dialysis/Infusion    No services have been selected for the patient.              Home Medical Care    No services have been selected for the patient.              Therapy    No services have been selected for the patient.              Community Resources    No services have been selected for the patient.              Community & DME    No services have been selected for the patient.                  Transportation Services  Private: Car    Final Discharge Disposition Code: 01 - home or self-care

## 2022-08-16 NOTE — OUTREACH NOTE
Prep Survey    Flowsheet Row Responses   [facility] patient discharged from? [place]   Is LACE score < 7 ? Yes   Emergency Room discharge w/ pulse ox? No   Eligibility [facility]   Date of Admission 08/14/22   Date of Discharge 08/15/22   Discharge Disposition Home or Self Care   Discharge diagnosis subarachnoid hemorrhage d/t fall with head injury   Does the patient have one of the following disease processes/diagnoses(primary or secondary)? Other   Does the patient have Home health ordered? No   Is there a DME ordered? No   Prep survey completed? Yes          JAMILAH Bunn Registered Nurse

## 2022-08-23 NOTE — TELEPHONE ENCOUNTER
Caller: Karolina Hoyt    Relationship: Self    Best call back number: [phone]    Who are you requesting to speak with (clinical staff, provider,  specific staff member): DR WESTON OR MA    What was the call regarding: PATIENT STATES THAT SHE WAS UNABLE TO MAKE HER HOSPITAL FOLLOW UP APPOINTMENT DUE TO CONFLICT WITH HER 'S CANCER TREATMENTS. REQUESTS A CALL BACK TO DISCUSS FURTHER, AND TO RESCHEDULE IF ABLE    Do you require a callback: YES

## 2022-08-29 NOTE — PROGRESS NOTES
"Karolina Hoyt is a 72 y.o. female admitted to [facility] and  is seen for follow up of subarachnoid hemorrhage.     Admission date 8/14/2022 D/C date 8/15/2022  Discharge summary is available.  Risk for Readmission (LACE) No data recorded    Tripped outside hitting head on storm door.  She went to ER, admitted with the above complaints.  She had repeat CT which was stable.  Upon d/c, she went home and has felt at baseline.       Current outpatient and discharge medications have been reconciled for the patient.  Reviewed by: Josie Youssef MD      She was admitted for the following reason(s):  Primary admitting diagnosis at discharge was subarachnoid hemorrhage.  Pertinent secondary diagnoses include c dif.  Course During Hospital Stay:  Admitted for observation, CT at 12 and 9 am. Neuro checks, etc.   Procedures Performed in Hospital: please see EPIC record for further details of results and finding  Pending tests: none  Pain Control:  well controlled  Diet: regular  Activity after Discharge: activity as tolerated    Items to be addressed at first follow up visit include:  1. Medication changes: none  2.     She reports her overall condition are improving since discharge.  No specific complaints.  Social support is said to be adequate to meet her needs. .     Objective   Vitals:    08/29/22 1334   BP: 136/70   Pulse: 74   Temp: 97.8 °F (36.6 °C)   Weight: 66.2 kg (146 lb)   Height: 167.6 cm (66\")     Body mass index is 23.57 kg/m².    Physical Exam  Constitutional:       Appearance: Normal appearance.   Cardiovascular:      Rate and Rhythm: Normal rate.   Neurological:      General: No focal deficit present.      Mental Status: She is oriented to person, place, and time. Mental status is at baseline.   Psychiatric:         Mood and Affect: Mood normal.          Assessment & Plan    Problem List Items Addressed This Visit        Cardiac and Vasculature    Essential hypertension       Infectious " Diseases    C. difficile colitis       Mental Health    Anxiety    Current Assessment & Plan     feels good with current meds- no change, could add 3rd dose of buspirone as needed.            Neuro    Subarachnoid hemorrhage (HCC) - Primary         1/ controlled BP, no change  2. Completed vancomycin- no current symptoms.  Monitor for recurrence.  3. Feels good with current meds.  No change.  Reassured she could take 3rd Buspar dose midday.  4. Clinically resolving, has f/u with neurosurgery.       {Followup: 23]    This post hospital patient care was coordinated using transitional care management strategy:  I certify that the following are true:  1. Communication was made within two business days of discharge.  2. Complexity of MDM is MODERATE  3. A Face to Face visit occurred within within 14 days

## 2022-09-13 NOTE — DISCHARGE INSTRUCTIONS
You are advised to follow closely with Dr. Youssef in 2-3 days for recheck, follow-up respiratory viral panel results, and incidental thyroid nodule findings noted on CT neck 8/15/2022, final results of lab work and imaging testing from today, and further testing/treatment as needed.    Hydrate well  Frequent snacks, between meals at home        Please return to the emergency department immediately with chest pain different than usual for you, shortness of air, abdominal pain, persistent vomiting/fever, blood in emesis or stool, lightheadedness/fainting, problems with speech, one sided weakness/numbness, new incontinence, problems with vision, altered mental status or for worsening of symptoms or other concerns.

## 2022-09-13 NOTE — ED NOTES
Ambulated pt in hallway. Pt tolerated ambulating, Sp02 93%. MD notified.         This ERT was wearing appropriate PPE throughout encounter. Pt was wearing a face mask.

## 2022-09-13 NOTE — ED PROVIDER NOTES
"EMERGENCY DEPARTMENT ENCOUNTER    Room Number:  17/17  Date seen:  9/13/2022  Time seen: 10:01 EDT  PCP: Josie Youssef MD  Historian: pt, daughter    HPI:  Chief complaint:\"I don't feel well\"  A complete HPI/ROS/PMH/PSH/SH/FH are unobtainable due to: n/a  Context:Karolina Hoyt is a 72 y.o. female with past medical history significant for hypertension, subarachnoid hemorrhage diverticulosis who presents to the ED with c/o several days of not feeling well that is difficult to describe.  She states she did have a little bit of laryngitis over the weekend but today just felt weak and severe fatigue was unable to take her  to his infusion appointment.  She denies any chest pain, tightness or pressure.  She denies any shortness of breath, fever or chills, no urinary symptoms.  She has no headache, visual changes, unilateral weakness, abdominal pain or vomiting or diarrhea.  She has had some intermittent nausea but none currently.  She denies any medication changes    Patient was placed in face mask in first look. Patient was wearing facemask when I entered the room and throughout our encounter. I wore full protective equipment throughout this patient encounter including a N95 face mask, eye shield and gloves. Hand hygiene/washing of hands was performed before donning protective equipment and after removal when leaving the room.    MEDICAL RECORD REVIEW  Reviewed patient's recent discharge summary dated 8/15/2022.  She was admitted after a subarachnoid hemorrhage due to a fall.  She also has a history of C. difficile colitis was discharged on a vancomycin taper    ALLERGIES  Erythromycin    PAST MEDICAL HISTORY  Active Ambulatory Problems     Diagnosis Date Noted   • Diverticulosis of large intestine 02/05/2018   • Essential hypertension 04/23/2019   • Anxiety 04/23/2019   • C. 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    • Tachycardia        PAST SURGICAL HISTORY  Past Surgical History:   Procedure Laterality Date   • COLONOSCOPY N/A 2/5/2018    Procedure: COLONOSCOPY TO CECUM;  Surgeon: Emelyn Bradshaw MD;  Location: [facility] ENDOSCOPY;  Service:    • HYSTERECTOMY     • THYROID LOBECTOMY      approx 1999 or 2000   • THYROIDECTOMY, PARTIAL  2002    Sub-Total Thyroidectomy       FAMILY HISTORY  Family History   Problem Relation Age of Onset   • Dementia Mother    • Breast cancer Mother    • Tuberculosis Father    • Breast cancer Sister    • Mental illness Sister         Family history of suicide   • Prostate cancer Brother        SOCIAL HISTORY  Social History     Socioeconomic History   • Marital status:    Tobacco Use   • Smoking status: Former Smoker     Packs/day: 1.00     Years: 40.00     Pack years: 40.00   • Smokeless tobacco: Never Used   • Tobacco comment: quit approx 2009   Substance and Sexual Activity   • Alcohol use: Yes     Comment: rarely   • Drug use: No       REVIEW OF SYSTEMS  Review of Systems    All systems reviewed and negative except for those discussed in HPI.     PHYSICAL EXAM    ED Triage Vitals [09/13/22 0931]   Temp Heart Rate Resp BP SpO2   98.5 °F (36.9 °C) 115 16 -- 95 %      Temp src Heart Rate Source Patient Position BP Location FiO2 (%)   Tympanic Monitor -- -- --     Physical Exam    I have reviewed the triage vital signs and nursing notes.      GENERAL: not distressed  HENT: nares patent, mm moist.  No posterior oral pharynx erythema, exudates or tonsillar edema.  Left TM normal, Right TM with some effusion.  No tragal tenderness.  No facial droop  EYES: no scleral icterus, PERRL  NECK: no ROM limitations  CV: regular rhythm, regular rate.  HR has improved since  triage vitals.  No murmur/rub/bianca  RESPIRATORY: normal effort, CTAB, speaks in full sentences  ABDOMEN: soft, rounded.  No focal tenderness.  No guarding/rebound/rigidity.  BS +  : deferred  MUSCULOSKELETAL: no deformity  NEURO: alert, moves all extremities, follows commands  SKIN: warm, dry    LAB RESULTS  Recent Results (from the past 24 hour(s))   POC Glucose Once    Collection Time: 09/13/22 10:12 AM    Specimen: Blood   Result Value Ref Range    Glucose 109 70 - 130 mg/dL   ECG 12 Lead    Collection Time: 09/13/22 10:22 AM   Result Value Ref Range    QT Interval 385 ms   Comprehensive Metabolic Panel    Collection Time: 09/13/22 10:54 AM    Specimen: Blood   Result Value Ref Range    Glucose 110 (H) 65 - 99 mg/dL    BUN 18 8 - 23 mg/dL    Creatinine 0.62 0.57 - 1.00 mg/dL    Sodium 139 136 - 145 mmol/L    Potassium 3.9 3.5 - 5.2 mmol/L    Chloride 103 98 - 107 mmol/L    CO2 27.8 22.0 - 29.0 mmol/L    Calcium 9.9 8.6 - 10.5 mg/dL    Total Protein 6.7 6.0 - 8.5 g/dL    Albumin 4.00 3.50 - 5.20 g/dL    ALT (SGPT) 24 1 - 33 U/L    AST (SGOT) 18 1 - 32 U/L    Alkaline Phosphatase 83 39 - 117 U/L    Total Bilirubin 1.8 (H) 0.0 - 1.2 mg/dL    Globulin 2.7 gm/dL    A/G Ratio 1.5 g/dL    BUN/Creatinine Ratio 29.0 (H) 7.0 - 25.0    Anion Gap 8.2 5.0 - 15.0 mmol/L    eGFR 94.8 >60.0 mL/min/1.73   Troponin    Collection Time: 09/13/22 10:54 AM    Specimen: Blood   Result Value Ref Range    Troponin T <0.010 0.000 - 0.030 ng/mL   Magnesium    Collection Time: 09/13/22 10:54 AM    Specimen: Blood   Result Value Ref Range    Magnesium 2.1 1.6 - 2.4 mg/dL   Green Top (Gel)    Collection Time: 09/13/22 10:54 AM   Result Value Ref Range    Extra Tube Hold for add-ons.    Lavender Top    Collection Time: 09/13/22 10:54 AM   Result Value Ref Range    Extra Tube hold for add-on    Gold Top - SST    Collection Time: 09/13/22 10:54 AM   Result Value Ref Range    Extra Tube Hold for add-ons.    Light Blue Top    Collection Time:  09/13/22 10:54 AM   Result Value Ref Range    Extra Tube Hold for add-ons.    CBC Auto Differential    Collection Time: 09/13/22 10:54 AM    Specimen: Blood   Result Value Ref Range    WBC 11.86 (H) 3.40 - 10.80 10*3/mm3    RBC 4.08 3.77 - 5.28 10*6/mm3    Hemoglobin 12.1 12.0 - 15.9 g/dL    Hematocrit 35.6 34.0 - 46.6 %    MCV 87.3 79.0 - 97.0 fL    MCH 29.7 26.6 - 33.0 pg    MCHC 34.0 31.5 - 35.7 g/dL    RDW 11.8 (L) 12.3 - 15.4 %    RDW-SD 38.5 37.0 - 54.0 fl    MPV 10.8 6.0 - 12.0 fL    Platelets 264 140 - 450 10*3/mm3    Neutrophil % 81.8 (H) 42.7 - 76.0 %    Lymphocyte % 7.9 (L) 19.6 - 45.3 %    Monocyte % 9.5 5.0 - 12.0 %    Eosinophil % 0.2 (L) 0.3 - 6.2 %    Basophil % 0.3 0.0 - 1.5 %    Immature Grans % 0.3 0.0 - 0.5 %    Neutrophils, Absolute 9.69 (H) 1.70 - 7.00 10*3/mm3    Lymphocytes, Absolute 0.94 0.70 - 3.10 10*3/mm3    Monocytes, Absolute 1.13 (H) 0.10 - 0.90 10*3/mm3    Eosinophils, Absolute 0.02 0.00 - 0.40 10*3/mm3    Basophils, Absolute 0.04 0.00 - 0.20 10*3/mm3    Immature Grans, Absolute 0.04 0.00 - 0.05 10*3/mm3    nRBC 0.0 0.0 - 0.2 /100 WBC   BNP    Collection Time: 09/13/22 10:54 AM    Specimen: Blood   Result Value Ref Range    proBNP 388.0 0.0 - 900.0 pg/mL   Urinalysis With Microscopic If Indicated (No Culture) - Urine, Clean Catch    Collection Time: 09/13/22 11:16 AM    Specimen: Urine, Clean Catch   Result Value Ref Range    Color, UA Yellow Yellow, Straw    Appearance, UA Turbid (A) Clear    pH, UA 5.5 5.0 - 8.0    Specific Gravity, UA 1.017 1.005 - 1.030    Glucose, UA Negative Negative    Ketones, UA Negative Negative    Bilirubin, UA Negative Negative    Blood, UA Trace (A) Negative    Protein, UA 30 mg/dL (1+) (A) Negative    Leuk Esterase, UA Negative Negative    Nitrite, UA Negative Negative    Urobilinogen, UA 0.2 E.U./dL 0.2 - 1.0 E.U./dL   Urinalysis, Microscopic Only - Urine, Clean Catch    Collection Time: 09/13/22 11:16 AM    Specimen: Urine, Clean Catch   Result Value  Ref Range    RBC, UA 3-5 (A) None Seen, 0-2 /HPF    WBC, UA 0-2 None Seen, 0-2 /HPF    Bacteria, UA None Seen None Seen /HPF    Squamous Epithelial Cells, UA None Seen None Seen, 0-2 /HPF    Hyaline Casts, UA None Seen None Seen /LPF    Methodology Automated Microscopy          RADIOLOGY RESULTS  CT Head Without Contrast    Result Date: 9/13/2022  CT HEAD WITHOUT CONTRAST  CLINICAL HISTORY: Malaise. Recent traumatic subarachnoid hemorrhage.  TECHNIQUE: CT scan of the head was obtained with 3 mm axial images. No intravenous contrast was administered. Sagittal and coronal reconstructions were obtained.  COMPARISON: CT head dated 08/15/2022.  FINDINGS:  There is interval resolution of the previously identified small amount of subarachnoid hemorrhage within the right postcentral sulcus.  The ventricles, sulci, and cisterns are age-appropriate. Moderate changes of chronic small vessel ischemic phenomena are incidentally noted. The basal ganglia and thalami are unremarkable in appearance. The posterior fossa structures are unremarkable. Again noted is a 1 cm focus of encephalomalacia within the anterior and inferior portion of the right frontal lobe that is probably representative of chronic traumatic contusion and less likely a chronic infarct.       Interval resolution of the previously identified small amount of subarachnoid hemorrhage within the right postcentral sulcus.  Again noted is a small focus of encephalomalacia within the anterior and inferior portion of the right frontal lobe measuring up to 1 cm in diameter that is likely representative of a chronic traumatic contusion and less likely a chronic infarct.   Radiation dose reduction techniques were utilized, including automated exposure control and exposure modulation based on body size.  This report was finalized on 9/13/2022 3:04 PM by Dr. Rocael Hudson M.D.      XR Chest 1 View    Result Date: 9/13/2022  XR CHEST 1 VW-  09/13/2022  HISTORY: Weakness.  Dizziness.  Heart size is mildly enlarged. There is mild interstitial prominence of the lungs. There is mild biapical pleural thickening.  No focal infiltrates are seen. No pneumothorax is seen.      1. Mild cardiomegaly. 2. Mild interstitial prominence of the lungs could be chronic in nature versus some minimal interstitial edema. 3. Mild biapical pleural thickening.  This report was finalized on 9/13/2022 10:56 AM by Dr. Sean Funez M.D.           PROGRESS, DATA ANALYSIS, CONSULTS AND MEDICAL DECISION MAKING  All labs have been independently reviewed by me.  All radiology studies have been reviewed by me and discussed with radiologist dictating the report.  EKG's independently viewed and interpreted by me unless stated otherwise. Discussion below represents my analysis of pertinent findings related to patient's condition, differential diagnosis, treatment plan and final disposition.     ED Course as of 09/13/22 1519   Tue Sep 13, 2022   1051 EKG     Viewed by ER MD prior to my interpretation     EKG time: 1022  Rhythm/Rate: 78, sinus rhythm  P waves and MO: Normal MO, normal JONATHAN  QRS, axis: Normal QRS and axis, LVH  ST and T waves: No acute ST or T wave abnormalities    Interpreted Contemporaneously by me, independently viewed  Compared with prior dated 2/12/2018 from her primary care office.  The Q waves are not new.  Rate on that day was heart rate in 90.  Otherwise it is similar to today's   [EW]   1434 Patient with oxygen saturations 93% on room air with ambulation, feels well, she and daughter agree to follow with PCP for recheck, respiratory viral panel results, hydrate well at home and to return with worsening of symptoms, other concerns [TO]   1435 Patient and daughter at bedside report they are aware of incidental CT findings from August 15 and agree to follow with primary care provider to discuss these findings of the thyroid and plan further testing, treatment as needed [TO]      ED Course User  "Index  [EW] Michelle Sheppard, APRN  [TO] Rita Alfaro MD     DDX: pneumonia, UTI, NSTEMI     Reviewed pt's history and workup with Dr. Alfaro.  After a bedside evaluation, Dr. Alfaro agrees with the plan of care.    The patient's history, physical exam, and lab findings were discussed with the physician, who also performed a face to face history and physical exam.  I discussed all results and noted any abnormalities with patient.  Discussed absoute need to recheck abnormalities with their family physician.  I answered any of the patient's questions.  Discussed plan for discharge, as there is no emergent indication for admission.  Pt is agreeable and understands need for follow up and repeat testing.  Pt is aware that discharge does not mean that nothing is wrong but it indicates no emergency is present and they must continue care with their family physician.  Pt is discharged with instructions to follow up with primary care doctor to have their blood pressure rechecked.       Disposition vitals:  /95   Pulse 86   Temp 98.5 °F (36.9 °C) (Tympanic)   Resp 16   Ht 167.6 cm (66\")   Wt 63 kg (139 lb)   SpO2 97%   BMI 22.44 kg/m²       DIAGNOSIS  Final diagnoses:   Upper respiratory tract infection, unspecified type   Generalized weakness       FOLLOW UP   Josie Youssef MD  [address] [address] [phone]    Schedule an appointment as soon as possible for a visit in 3 days  EVEN IF WELL         Michelle Sheppard APRN  09/13/22 1519    "

## 2022-09-13 NOTE — ED PROVIDER NOTES
The RAMESH and I have discussed this patient's history, physical exam and treatment plan.  I provided a substantive portion of the care of this patient.  I have reviewed the documentation and personally had a face to face interaction with the patient and personally performed the physical exam, in its entirety.  I affirm the documentation and agree with the treatment and plan.  The following describes my personal findings.      The patient presents complaining of congestion, sore throat, dry cough, generalized weakness and lots of sleeping over the past week.  Patient reports she has right-sided neck pain, worse with turning her head to the right.  patient denies chest pain, fever, shortness of air, pain with urination, urinary frequency, vomiting, unilateral weakness or numbness, headache, abdominal pain.    On review the patient's chart is noted:  Patient was admitted 8/14 through 8/15 with subarachnoid hemorrhage secondary to fall, CT cervical spine 8/15/2022 without acute traumatic injury reported a nodule in the thyroid with follow-up imaging recommended.    Comprehensive Physical exam:  Patient is nontoxic appearing conversant, oriented awake, alert  HEENT: normocephalic, atraumatic  Neck: No JVD no goiter, no pain with ROM, tenderness to palpation right paravertebral cervical musculature, no vertebral tenderness to palpation  Pulmonary: Nontachypneic, breath sounds mildly distant bilaterally, no wheezes or rales  cardiovascular: Nontachycardic  Abdomen: Soft, nontender, no guarding or rebound  musculoskeletal: Good range of motion, pulse, sensation x4,  strong bilaterally  Neuro/psychiatric:calm, appropriate, cooperative  Skin:warm, dry      EKG          EKG time: 1023  Rhythm/Rate: Sinus rhythm, rate in the 70s  P waves and NJ: Right atrial margin  QRS, axis: LVH, Q's in inferior leads  ST and T waves: Unremarkable ST/T wave findings    Interpreted Contemporaneously by me, independently viewed  2/12/2018  minimally changed compared to prior     LAB RESULTS  Recent Results (from the past 24 hour(s))   POC Glucose Once    Collection Time: 09/13/22 10:12 AM    Specimen: Blood   Result Value Ref Range    Glucose 109 70 - 130 mg/dL   ECG 12 Lead    Collection Time: 09/13/22 10:22 AM   Result Value Ref Range    QT Interval 385 ms   Comprehensive Metabolic Panel    Collection Time: 09/13/22 10:54 AM    Specimen: Blood   Result Value Ref Range    Glucose 110 (H) 65 - 99 mg/dL    BUN 18 8 - 23 mg/dL    Creatinine 0.62 0.57 - 1.00 mg/dL    Sodium 139 136 - 145 mmol/L    Potassium 3.9 3.5 - 5.2 mmol/L    Chloride 103 98 - 107 mmol/L    CO2 27.8 22.0 - 29.0 mmol/L    Calcium 9.9 8.6 - 10.5 mg/dL    Total Protein 6.7 6.0 - 8.5 g/dL    Albumin 4.00 3.50 - 5.20 g/dL    ALT (SGPT) 24 1 - 33 U/L    AST (SGOT) 18 1 - 32 U/L    Alkaline Phosphatase 83 39 - 117 U/L    Total Bilirubin 1.8 (H) 0.0 - 1.2 mg/dL    Globulin 2.7 gm/dL    A/G Ratio 1.5 g/dL    BUN/Creatinine Ratio 29.0 (H) 7.0 - 25.0    Anion Gap 8.2 5.0 - 15.0 mmol/L    eGFR 94.8 >60.0 mL/min/1.73   Troponin    Collection Time: 09/13/22 10:54 AM    Specimen: Blood   Result Value Ref Range    Troponin T <0.010 0.000 - 0.030 ng/mL   Magnesium    Collection Time: 09/13/22 10:54 AM    Specimen: Blood   Result Value Ref Range    Magnesium 2.1 1.6 - 2.4 mg/dL   Green Top (Gel)    Collection Time: 09/13/22 10:54 AM   Result Value Ref Range    Extra Tube Hold for add-ons.    Lavender Top    Collection Time: 09/13/22 10:54 AM   Result Value Ref Range    Extra Tube hold for add-on    Gold Top - SST    Collection Time: 09/13/22 10:54 AM   Result Value Ref Range    Extra Tube Hold for add-ons.    Light Blue Top    Collection Time: 09/13/22 10:54 AM   Result Value Ref Range    Extra Tube Hold for add-ons.    CBC Auto Differential    Collection Time: 09/13/22 10:54 AM    Specimen: Blood   Result Value Ref Range    WBC 11.86 (H) 3.40 - 10.80 10*3/mm3    RBC 4.08 3.77 - 5.28 10*6/mm3     Hemoglobin 12.1 12.0 - 15.9 g/dL    Hematocrit 35.6 34.0 - 46.6 %    MCV 87.3 79.0 - 97.0 fL    MCH 29.7 26.6 - 33.0 pg    MCHC 34.0 31.5 - 35.7 g/dL    RDW 11.8 (L) 12.3 - 15.4 %    RDW-SD 38.5 37.0 - 54.0 fl    MPV 10.8 6.0 - 12.0 fL    Platelets 264 140 - 450 10*3/mm3    Neutrophil % 81.8 (H) 42.7 - 76.0 %    Lymphocyte % 7.9 (L) 19.6 - 45.3 %    Monocyte % 9.5 5.0 - 12.0 %    Eosinophil % 0.2 (L) 0.3 - 6.2 %    Basophil % 0.3 0.0 - 1.5 %    Immature Grans % 0.3 0.0 - 0.5 %    Neutrophils, Absolute 9.69 (H) 1.70 - 7.00 10*3/mm3    Lymphocytes, Absolute 0.94 0.70 - 3.10 10*3/mm3    Monocytes, Absolute 1.13 (H) 0.10 - 0.90 10*3/mm3    Eosinophils, Absolute 0.02 0.00 - 0.40 10*3/mm3    Basophils, Absolute 0.04 0.00 - 0.20 10*3/mm3    Immature Grans, Absolute 0.04 0.00 - 0.05 10*3/mm3    nRBC 0.0 0.0 - 0.2 /100 WBC   BNP    Collection Time: 09/13/22 10:54 AM    Specimen: Blood   Result Value Ref Range    proBNP 388.0 0.0 - 900.0 pg/mL   Urinalysis With Microscopic If Indicated (No Culture) - Urine, Clean Catch    Collection Time: 09/13/22 11:16 AM    Specimen: Urine, Clean Catch   Result Value Ref Range    Color, UA Yellow Yellow, Straw    Appearance, UA Turbid (A) Clear    pH, UA 5.5 5.0 - 8.0    Specific Gravity, UA 1.017 1.005 - 1.030    Glucose, UA Negative Negative    Ketones, UA Negative Negative    Bilirubin, UA Negative Negative    Blood, UA Trace (A) Negative    Protein, UA 30 mg/dL (1+) (A) Negative    Leuk Esterase, UA Negative Negative    Nitrite, UA Negative Negative    Urobilinogen, UA 0.2 E.U./dL 0.2 - 1.0 E.U./dL   Urinalysis, Microscopic Only - Urine, Clean Catch    Collection Time: 09/13/22 11:16 AM    Specimen: Urine, Clean Catch   Result Value Ref Range    RBC, UA 3-5 (A) None Seen, 0-2 /HPF    WBC, UA 0-2 None Seen, 0-2 /HPF    Bacteria, UA None Seen None Seen /HPF    Squamous Epithelial Cells, UA None Seen None Seen, 0-2 /HPF    Hyaline Casts, UA None Seen None Seen /LPF    Methodology Automated  Microscopy    Respiratory Panel PCR w/COVID-19(SARS-CoV-2) VERONICA/EMRE/BHAKTI/PAD/COR/MAD/CESILIA In-House, NP Swab in UTM/VTM, 3-4 HR TAT - Swab, Nasopharynx    Collection Time: 09/13/22  2:26 PM    Specimen: Nasopharynx; Swab   Result Value Ref Range    ADENOVIRUS, PCR Not Detected Not Detected    Coronavirus 229E Not Detected Not Detected    Coronavirus HKU1 Not Detected Not Detected    Coronavirus NL63 Not Detected Not Detected    Coronavirus OC43 Not Detected Not Detected    COVID19 Not Detected Not Detected - Ref. Range    Human Metapneumovirus Not Detected Not Detected    Human Rhinovirus/Enterovirus Not Detected Not Detected    Influenza A PCR Not Detected Not Detected    Influenza B PCR Not Detected Not Detected    Parainfluenza Virus 1 Not Detected Not Detected    Parainfluenza Virus 2 Not Detected Not Detected    Parainfluenza Virus 3 Not Detected Not Detected    Parainfluenza Virus 4 Not Detected Not Detected    RSV, PCR Not Detected Not Detected    Bordetella pertussis pcr Not Detected Not Detected    Bordetella parapertussis PCR Not Detected Not Detected    Chlamydophila pneumoniae PCR Not Detected Not Detected    Mycoplasma pneumo by PCR Not Detected Not Detected       I ordered the above labs and reviewed the results.    RADIOLOGY  CT Head Without Contrast    Result Date: 9/13/2022  CT HEAD WITHOUT CONTRAST  CLINICAL HISTORY: Malaise. Recent traumatic subarachnoid hemorrhage.  TECHNIQUE: CT scan of the head was obtained with 3 mm axial images. No intravenous contrast was administered. Sagittal and coronal reconstructions were obtained.  COMPARISON: CT head dated 08/15/2022.  FINDINGS:  There is interval resolution of the previously identified small amount of subarachnoid hemorrhage within the right postcentral sulcus.  The ventricles, sulci, and cisterns are age-appropriate. Moderate changes of chronic small vessel ischemic phenomena are incidentally noted. The basal ganglia and thalami are unremarkable in  appearance. The posterior fossa structures are unremarkable. Again noted is a 1 cm focus of encephalomalacia within the anterior and inferior portion of the right frontal lobe that is probably representative of chronic traumatic contusion and less likely a chronic infarct.       Interval resolution of the previously identified small amount of subarachnoid hemorrhage within the right postcentral sulcus.  Again noted is a small focus of encephalomalacia within the anterior and inferior portion of the right frontal lobe measuring up to 1 cm in diameter that is likely representative of a chronic traumatic contusion and less likely a chronic infarct.   Radiation dose reduction techniques were utilized, including automated exposure control and exposure modulation based on body size.  This report was finalized on 9/13/2022 3:04 PM by Dr. Rocael Hudson M.D.      XR Chest 1 View    Result Date: 9/13/2022  XR CHEST 1 VW-  09/13/2022  HISTORY: Weakness. Dizziness.  Heart size is mildly enlarged. There is mild interstitial prominence of the lungs. There is mild biapical pleural thickening.  No focal infiltrates are seen. No pneumothorax is seen.      1. Mild cardiomegaly. 2. Mild interstitial prominence of the lungs could be chronic in nature versus some minimal interstitial edema. 3. Mild biapical pleural thickening.  This report was finalized on 9/13/2022 10:56 AM by Dr. Sean Funez M.D.        I ordered the above noted radiological studies. I reviewed the images and results. I agree with the radiologist interpretation.    PROCEDURES  Procedures      PROGRESS, DATA ANALYSIS, CONSULTS, AND MEDICAL DECISION MAKING  A complete history and physical exam have been performed.  All available laboratory and imaging results have been reviewed by myself prior to disposition.        [facility]    ED Course as of 09/13/22 2153   Tue Sep 13, 2022   1051 EKG     Viewed by SEGUNDO CARIAS prior to my interpretation     EKG time: 1022  Rhythm/Rate: 78,  sinus rhythm  P waves and VA: Normal VA, normal JONATHAN  QRS, axis: Normal QRS and axis, LVH  ST and T waves: No acute ST or T wave abnormalities    Interpreted Contemporaneously by me, independently viewed  Compared with prior dated 2/12/2018 from her primary care office.  The Q waves are not new.  Rate on that day was heart rate in 90.  Otherwise it is similar to today's   [EW]   1434 Patient with oxygen saturations 93% on room air with ambulation, feels well, she and daughter agree to follow with PCP for recheck, respiratory viral panel results, hydrate well at home and to return with worsening of symptoms, other concerns [TO]   1435 Patient and daughter at bedside report they are aware of incidental CT findings from August 15 and agree to follow with primary care provider to discuss these findings of the thyroid and plan further testing, treatment as needed [TO]      ED Course User Index  [EW] Michelle Sheppard APRN  [TO] Rita Alfaro MD       AS OF 21:53 EDT VITALS:    BP - 133/95  HR - 86  TEMP - 98.5 °F (36.9 °C) (Tympanic)  O2 SATS - 97%    DIAGNOSIS  Final diagnoses:   Upper respiratory tract infection, unspecified type   Generalized weakness         DISPOSITION  DISCHARGE    Patient discharged in stable condition.    Reviewed implications of results, diagnosis, meds, responsibility to follow up, warning signs and symptoms of possible worsening, potential complications and reasons to return to ER,    Patient/Family voiced understanding of above instructions.    Discussed plan for discharge, as there is no emergent indication for admission. Patient referred to primary care provider for BP management due to today's BP. Pt/family is agreeable and understands need for follow up and repeat testing.  Pt is aware that discharge does not mean that nothing is wrong but it indicates no emergency is present that requires admission and they must continue care with follow-up as given below or physician of their choice.      FOLLOW-UP  Josie Youssef MD  [address]  [address] [address]  [phone]    Schedule an appointment as soon as possible for a visit in 3 days  EVEN IF WELL         Medication List      Changed    hydrALAZINE 50 MG tablet  Commonly known as: APRESOLINE  TAKE 1 TABLET TWICE A DAY  What changed:   · how to take this  · when to take this  · additional instructions                Face mask, protective goggles and gloves were worn throughout the patient encounter, unless additional PPE was worn as indicated. Hand hygiene was performed before entering and after leaving the patient room.        Patient was wearing facemask when I entered the room and throughout our encounter. Full protective equipment was worn throughout this patient encounter including a face mask, eye protection and gloves. Hand hygiene was performed before donning protective equipment and after removal when leaving the room.           Rita Alfaro MD  09/13/22 [id number]

## 2022-09-13 NOTE — ED TRIAGE NOTES
Pt feels tired.  She feels awful.  She is weak.  Pt has a HA    Patient was placed in face mask during first look triage.  Patient was wearing a face mask throughout encounter.  I wore personal protective equipment throughout the encounter.  Hand hygiene was performed before and after patient encounter.

## 2022-09-14 NOTE — PROGRESS NOTES
"Chief Complaint  Headache    Subjective        Karolina Hoyt presents to [facility]  History of Present Illness  Here with - both think that since she hit her head and had a bleed she hasn't been the same.  She had a CT which showed resolution of her subarachnoid hemorrhage.  She has intermittent headache, no light sensitivity.  She is eating and drinking well.   is concerned that she isn't up and around as much as she usually is-   Denies CP, SOB but  thinks she is SOB.   EKG yesterday with inferior Q waves, no prior tracing. CXR with cardiomegaly ? edema    Objective   Vital Signs:  Temp 97.3 °F (36.3 °C)   Ht 167.6 cm (66\")   Wt 63 kg (139 lb)   BMI 22.44 kg/m²   Estimated body mass index is 22.44 kg/m² as calculated from the following:    Height as of this encounter: 167.6 cm (66\").    Weight as of this encounter: 63 kg (139 lb).    BMI is within normal parameters. No other follow-up for BMI required.      Physical Exam  Constitutional:       General: She is not in acute distress.     Appearance: Normal appearance. She is not ill-appearing.   Cardiovascular:      Rate and Rhythm: Normal rate and regular rhythm.   Pulmonary:      Effort: Pulmonary effort is normal.      Breath sounds: Normal breath sounds.   Musculoskeletal:      Cervical back: No rigidity.      Right lower leg: No edema.      Left lower leg: No edema.   Lymphadenopathy:      Cervical: No cervical adenopathy.   Skin:     General: Skin is warm and dry.   Neurological:      Cranial Nerves: No cranial nerve deficit.      Comments: Slowed but appropriate        Result Review :  The following data was reviewed by: Josie Youssef MD on 09/14/2022:  Common labs    Common Labsle 8/2/22 8/2/22 8/15/22 8/15/22 9/13/22 9/13/22    1332 1332 0126 0126 1054 1054   Glucose  90  100 (A)  110 (A)   BUN  19  19  18   Creatinine  0.67  0.73  0.62   Sodium  140  141  139   Potassium  4.1  4.2  3.9   Chloride  " 101  104  103   Calcium  10.1  9.7  9.9   Total Protein  6.8       Albumin  4.3  4.40  4.00   Total Bilirubin  1.2  1.0  1.8 (A)   Alkaline Phosphatase  81  83  83   AST (SGOT)  14  20  18   ALT (SGPT)  9  16  24   WBC 7.0  7.64  11.86 (A)    Hemoglobin 13.3  12.5  12.1    Hematocrit 39.9  39.0  35.6    Platelets 320  282  264    (A) Abnormal value            Data reviewed: Recent hospitalization notes ER notes          Assessment and Plan   Diagnoses and all orders for this visit:    1. Nodule of left lobe of thyroid gland (Primary)  Comments:  noted on CT of neck, will check u/s  Orders:  -     US Thyroid    2. Cardiomegaly  Comments:  and EKG with Q waves- check echo.  Orders:  -     Adult Transthoracic Echo Complete W/ Cont if Necessary Per Protocol; Future    3. Subarachnoid hemorrhage (HCC)  Comments:  I am not sure if this is at all related as it has cleared by CT- we will f/u in short order to recheck.     4. Other fatigue  Comments:  reviewed ER notes/labs/scans- no clear etiology.              Follow Up {Instructions Charge Capture  Follow-up Communications :23}  No follow-ups on file.  Patient was given instructions and counseling regarding her condition or for health maintenance advice. Please see specific information pulled into the AVS if appropriate.

## 2022-09-20 NOTE — PROGRESS NOTES
Subjective   History of Present Illness: Karolina Hoyt is a 72 y.o. female is here today for hospital follow-up for a traumatic subarachnoid hemorrhage after a ground-level fall 1 month ago.  She had a follow-up CT head completed 9/13/22.   She is doing well today.  No new complaints.  Denies any changes in the frequency or severity of her headaches.  Denies any changes in vision.  Denies any strokelike episodes.  Denies any changes in strength or sensation.      History of Present Illness    The following portions of the patient's history were reviewed and updated as appropriate: allergies, past family history, past medical history, past social history, past surgical history and problem list.    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     -never treated-   • Tachycardia         Past Surgical History:   Procedure Laterality Date   • COLONOSCOPY N/A 2/5/2018    Procedure: COLONOSCOPY TO CECUM;  Surgeon: Emelyn Bradshaw MD;  Location: [facility] ENDOSCOPY;  Service:    • HYSTERECTOMY     • THYROID LOBECTOMY      approx 1999 or 2000   • THYROIDECTOMY, PARTIAL  2002    Sub-Total Thyroidectomy          Current Outpatient Medications:   •  busPIRone (BUSPAR) 10 MG tablet, Take 1 tablet by mouth 2 (Two) Times a Day., Disp: 180 tablet, Rfl: 1  •  desvenlafaxine (PRISTIQ) 50 MG 24 hr tablet, Take 50 mg by mouth Every Morning., Disp: , Rfl:   •  hydrALAZINE (APRESOLINE) 50 MG tablet, TAKE 1 TABLET TWICE A DAY (Patient taking differently: 50 mg Daily. Patient states once a day, at noon), Disp: 180 tablet, Rfl: 1  •  losartan (COZAAR) 100 MG tablet, TAKE 1 TABLET BY MOUTH DAILY, Disp: 90 tablet, Rfl: 1     Allergies   Allergen Reactions   • Erythromycin Nausea Only        Social History     Socioeconomic History   • Marital status:    Tobacco Use   • Smoking status: Former Smoker     Packs/day:  "1.00     Years: 40.00     Pack years: 40.00   • Smokeless tobacco: Never Used   • Tobacco comment: quit approx    Substance and Sexual Activity   • Alcohol use: Yes     Comment: rarely   • Drug use: No        Family History   Problem Relation Age of Onset   • Dementia Mother    • Breast cancer Mother    • Tuberculosis Father    • Breast cancer Sister    • Mental illness Sister         Family history of suicide   • Prostate cancer Brother         Review of Systems   Eyes: Negative for visual disturbance.   Neurological: Positive for dizziness and light-headedness. Negative for headaches.   Psychiatric/Behavioral: Positive for confusion.       Objective     Vitals:    22 0955   BP: 140/82   Temp: 96.8 °F (36 °C)   SpO2: 99%   Weight: 66.2 kg (146 lb)   Height: 167.6 cm (66\")     Body mass index is 23.57 kg/m².      Physical Exam  Neurologic Exam  Awake, alert, oriented x3  Pupils equal round reactive to light  Extraocular muscles intact  Face symmetric  Speech is fluent and clear  No pronator drift  Motor exam  Bilateral deltoids 5/5, bilateral biceps 5/5, bilateral triceps 5/5, bilateral wrist extension 5/5 bilateral hand  5/5  Bilateral hip flexion 5/5, bilateral knee extension 5/5, bilateral DF/PF 5/5  No clonus  No Barbara's reflex  Steady normal gait  Able to detect  light touch in all 4 extremities        Assessment & Plan   Independent Review of Radiographic Studies:      I personally reviewed the images from the following studies.  CT head without contrast from 2022, 2022, August 15, 2022, CT cervical spine from August 15, 2022  The follow-up CT head shows expected resolution of the traumatic subarachnoid hemorrhage.  There is no evidence of chronic subdural hematomas.    Medical Decision Makin-year-old female s/p ground-level fall with a closed head injury and traumatic subarachnoid hemorrhage on 2022  -She has recovered very well from the fall.  The " follow-up CT head shows resolution of the traumatic subarachnoid hemorrhage.  There is no evidence of additional hemorrhage or accumulation of a chronic subdural hematoma.  -She is doing well today and has no new complaints.  -I have asked her to follow-up as needed if she develops any new neurologic symptoms or headaches    Diagnoses and all orders for this visit:    1. Subarachnoid hemorrhage (HCC) (Primary)      Return if symptoms worsen or fail to improve.

## 2022-11-07 NOTE — H&P (VIEW-ONLY)
"Chief Complaint  Hypertension    Subjective        Karolina Hoyt presents to [facility]  History of Present Illness has appt tomorrow for u/s and FNA of thyroid.  Had testing with [facility]- results pending.  Seeing psychiatrist- does feel like she is doing much better- not feeling as depressed, decided to \"get out of the rabbit hole\"  Had echo- reported as normal. She is having no symptoms.   Got released from  for her bleed- did well.     Objective   Vital Signs:  /74   Pulse 88   Temp 97.3 °F (36.3 °C)   Ht 167 cm (65.75\")   Wt 65.3 kg (144 lb)   BMI 23.42 kg/m²   Estimated body mass index is 23.42 kg/m² as calculated from the following:    Height as of this encounter: 167 cm (65.75\").    Weight as of this encounter: 65.3 kg (144 lb).    BMI is within normal parameters. No other follow-up for BMI required.      Physical Exam  Constitutional:       Appearance: Normal appearance.   Cardiovascular:      Rate and Rhythm: Normal rate and regular rhythm.   Musculoskeletal:      Right lower leg: No edema.      Left lower leg: No edema.   Lymphadenopathy:      Cervical: No cervical adenopathy.        Result Review :  The following data was reviewed by: Josie Youssef MD on 11/07/2022:    Data reviewed: Radiologic studies thyroid u/s and Cardiology studies echo          Assessment and Plan   Diagnoses and all orders for this visit:    1. Essential hypertension (Primary)  Comments:  doing great- no change.     2. Nodule of left lobe of thyroid gland  Comments:  test tomorrow, check TSH at f/u    3. Anxiety  Comments:  much improved- continue same.     to call Dr. Ingram for f/u         Follow Up   Return in about 6 months (around 5/7/2023) for Recheck, Lab Before FUP.  Patient was given instructions and counseling regarding her condition or for health maintenance advice. Please see specific information pulled into the AVS if appropriate.       "

## 2022-11-07 NOTE — PROGRESS NOTES
"Chief Complaint  Hypertension    Subjective        Karolina Hoyt presents to [facility]  History of Present Illness has appt tomorrow for u/s and FNA of thyroid.  Had testing with [facility]- results pending.  Seeing psychiatrist- does feel like she is doing much better- not feeling as depressed, decided to \"get out of the rabbit hole\"  Had echo- reported as normal. She is having no symptoms.   Got released from  for her bleed- did well.     Objective   Vital Signs:  /74   Pulse 88   Temp 97.3 °F (36.3 °C)   Ht 167 cm (65.75\")   Wt 65.3 kg (144 lb)   BMI 23.42 kg/m²   Estimated body mass index is 23.42 kg/m² as calculated from the following:    Height as of this encounter: 167 cm (65.75\").    Weight as of this encounter: 65.3 kg (144 lb).    BMI is within normal parameters. No other follow-up for BMI required.      Physical Exam  Constitutional:       Appearance: Normal appearance.   Cardiovascular:      Rate and Rhythm: Normal rate and regular rhythm.   Musculoskeletal:      Right lower leg: No edema.      Left lower leg: No edema.   Lymphadenopathy:      Cervical: No cervical adenopathy.        Result Review :  The following data was reviewed by: Josie Youssef MD on 11/07/2022:    Data reviewed: Radiologic studies thyroid u/s and Cardiology studies echo          Assessment and Plan   Diagnoses and all orders for this visit:    1. Essential hypertension (Primary)  Comments:  doing great- no change.     2. Nodule of left lobe of thyroid gland  Comments:  test tomorrow, check TSH at f/u    3. Anxiety  Comments:  much improved- continue same.     to call Dr. Ingram for f/u         Follow Up   Return in about 6 months (around 5/7/2023) for Recheck, Lab Before FUP.  Patient was given instructions and counseling regarding her condition or for health maintenance advice. Please see specific information pulled into the AVS if appropriate.       "

## 2022-11-08 NOTE — DISCHARGE INSTRUCTIONS
EDUCATION / DISCHARGE INSTRUCTIONS  Aspiration:  An aspiration is a procedure used to take a sample of cells or tissue from a lump, mass or other area of concern.   Within a week the radiologist will send a report to your physician.  A pathologist will also examine the tissue and send a report.  THIS EDUCATION INFORMATION WAS REVIEWED PRIOR TO THE PROCEDURE AND CONSENT.  Patient initials_________________Time________________      Post Procedure:    *  Rest today (no pushing pulling or straining).   *  Slowly increase activity tomorow.    *  If you received sedation do not drive for 24 hours.   *  Keep dressing clean and dry.   *  Leave dressing on puncture site for 24 hours.    *  You may shower when dressing removed.   *  If needed, use an ice pack at the site for up to 20 minutes at a time for pain.    Call your doctor if experiencing:   *  Signs of infection such as redness, swelling, excessive pain and / or foul smelling drainage from the puncture site.   *  Chills or fever over 101 degrees (by mouth).   *  Unrelieved pain.   *  Any new or severe symptoms.      Following the procedure:     Follow-up with the ordering physician as directed.    Continue to take other medications as directed by your physician unless otherwise instructed.       If you have any concerns please call the Radiology Nurses Desk at [phone].  You are the most important factor in your recovery.  Follow the above instructions carefully.            EDUCATION /DISCHARGE INSTRUCTIONS  CT/US guided biopsy:  A biopsy is a procedure done to remove tissue for further analysis.  Before images are taken to locate the target area.  Images can be obtained using ultrasound, CT or MRI.  A physician will clean your skin with antiseptic soap, place a sterile towel around the site and administer a local anesthetic to numb the area.  The physician will then insert a special needle.  Sometimes images are taken of the needle after it is inserted to ensure  the needle is in the correct area to be biopsied.   A sample is obtained and sent to the laboratory for study.  Occasionally the laboratory is unable to make a diagnosis from the sample and the procedure may need to be repeated.  Within a week the radiologist will send a report to your physician.  A pathologist will also examine the tissue and send a report.    Risks of the procedure include but are not limited to:   *  Bleeding    *  Infection   *  Puncture of surrounding organs *  Death     *  Lung collapse if the biopsy is near the chest which may require insertion of a      chest tube to re-inflate the lung if severe.    Benefits of the procedure:  Using x-ray helps to locate the area that requires a biopsy. The procedure is less invasive than a surgical procedure, there are no large incisions and it does not require anesthesia.    Alternatives to the procedure:  A biopsy can be performed surgically.  Risks of a surgical biopsy include exposure to anesthesia, infection, excessive bleeding and injury to abdominal organs.  A benefit of surgical biopsy is the ability to see the area to be biopsied and remove of a larger piece of tissue.    THIS EDUCATION INFORMATION WAS REVIEWED PRIOR TO PROCEDURE AND CONSENT. Patient initials__________________Time___________________    Post Procedure:    *  Expect the biopsy site may be tender up to one week.    *  Rest today (no pushing pulling or straining).   *  Slowly increase activity tomorrow.    *  If you received sedation do not drive for 24 hours.   *  Keep dressing clean and dry.   *  Leave dressing on puncture site for 24 hours.    *  You may shower when dressing removed.  Call your doctor if experiencing:   *  Signs of infection such as redness, swelling, excessive pain and / or foul        smelling drainage from the puncture site.   *  Chills or fever over 101 degrees (by mouth).   *  Unrelieved pain.   *  Any new or severe symptoms.   *  If experiencing sudden /  severe shortness of breath or chest pain go to the       nearest emergency room.   Following the procedure:     Follow-up with the ordering physician as directed.    Continue to take other medications as directed by your physician unless    otherwise instructed.    If you have any concerns please call the Radiology Nurses Desk at [phone].  You are the most important factor in your recovery.  Follow the above instructions carefully.

## 2022-11-08 NOTE — NURSING NOTE
Patient ambulated independently to entrance A.  Protective goggles and mask in place with all patient interactions today

## 2022-11-08 NOTE — NURSING NOTE
Pt arrived in radiology triage for FNA.  Protective goggles and mask in place with all patient interactions today

## 2022-12-07 NOTE — TELEPHONE ENCOUNTER
Caller: Karolina Hoyt    Relationship: Self    Best call back number: [phone]    What orders are you requesting (i.e. lab or imaging): SLEEP STUDY    In what timeframe would the patient need to come in: AS SOON AS POSSIBLE    Where will you receive your lab/imaging services: OPEN     Additional notes:     HARD TIME SLEEPING ALL THE WAY THROUGH THE NIGHT. WAKE UP 3 TO 4 TIMES A NIGHT.

## 2022-12-08 PROBLEM — I61.9 INTRAPARENCHYMAL HEMORRHAGE OF BRAIN (HCC): Status: ACTIVE | Noted: 2022-01-01

## 2022-12-09 NOTE — CONSULTS
[facility] NEUROSURGERY CONSULT NOTE    Patient name: Karolina Hoyt  Referring Provider: Dr. Leahy  Reason for Consultation: ICH, speech difficulty    Patient Care Team:  Josie Youssef MD as PCP - General (Internal Medicine)  Emelyn Bradshaw MD as Surgeon (General Surgery)  Leydi Guillaume MD as Consulting Physician (Psychiatry)  Yaa Ingram MD as Consulting Physician (Obstetrics and Gynecology)  Shruti Ibrahim APRN as Nurse Practitioner (Gastroenterology)    Chief complaint: ICH    Subjective .     History of present illness:    Patient is a 72 y.o. female with a history of previous SAH evaluated by Dr. Pimentel in the past. She presented to the ED last night after her daughter noticed a difference in her speech. The patient lives alone and is not on any blood thinners. At present she is aphasic but reportedly there were no falls or head injury/trauma. Denies headaches. Her blood pressure is typically well controlled. On admission she was found to have a spontaneous intracranial hemorrhage and we were asked to see in consultation.       Review of Systems  Review of Systems   Constitutional: Positive for activity change.   Gastrointestinal: Positive for nausea and vomiting.   Neurological: Positive for speech difficulty.       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-   • Tachycardia        PAST SURGICAL HISTORY  Past Surgical History:   Procedure Laterality Date   • COLONOSCOPY N/A 2/5/2018    Procedure: COLONOSCOPY TO CECUM;  Surgeon: Emelyn Bradshaw MD;  Location: [facility] END[facility];  Service:    • HYSTERECTOMY     • THYROID LOBECTOMY      approx 1999 or 2000   • THYROIDECTOMY, PARTIAL  2002    Sub-Total Thyroidectomy       FAMILY HISTORY  Family History   Problem Relation Age of Onset   • Dementia Mother    • Breast cancer Mother     • Tuberculosis Father    • Breast cancer Sister    • Mental illness Sister         Family history of suicide   • Prostate cancer Brother        SOCIAL HISTORY  Social History     Tobacco Use   • Smoking status: Former     Packs/day: 1.00     Years: 40.00     Pack years: 40.00     Types: Cigarettes   • Smokeless tobacco: Never   • Tobacco comments:     quit approx 2009   Substance Use Topics   • Alcohol use: Yes     Comment: rarely   • Drug use: No     not   retired    Allergies:  Erythromycin    MEDICATIONS:  Medications Prior to Admission   Medication Sig Dispense Refill Last Dose   • busPIRone (BUSPAR) 10 MG tablet Take 1 tablet by mouth 2 (Two) Times a Day. 180 tablet 1    • desvenlafaxine (PRISTIQ) 50 MG 24 hr tablet Take 50 mg by mouth Every Morning.      • hydrALAZINE (APRESOLINE) 50 MG tablet TAKE 1 TABLET TWICE A DAY (Patient taking differently: 1 tablet Daily. Patient states once a day, at noon) 180 tablet 1    • losartan (COZAAR) 100 MG tablet TAKE 1 TABLET BY MOUTH DAILY 90 tablet 1        Objective     Results Review:  LABS:    Admission on 12/08/2022   Component Date Value Ref Range Status   • Glucose 12/08/2022 71  70 - 130 mg/dL Final    Meter: [id number] : [id number] Quinn Lubna FULTONYADIRA   • Glucose 12/08/2022 72  65 - 99 mg/dL Final   • BUN 12/08/2022 20  8 - 23 mg/dL Final   • Creatinine 12/08/2022 0.66  0.57 - 1.00 mg/dL Final   • Sodium 12/08/2022 141  136 - 145 mmol/L Final   • Potassium 12/08/2022 4.0  3.5 - 5.2 mmol/L Final   • Chloride 12/08/2022 103  98 - 107 mmol/L Final   • CO2 12/08/2022 28.2  22.0 - 29.0 mmol/L Final   • Calcium 12/08/2022 10.1  8.6 - 10.5 mg/dL Final   • Total Protein 12/08/2022 7.5  6.0 - 8.5 g/dL Final   • Albumin 12/08/2022 4.40  3.50 - 5.20 g/dL Final   • ALT (SGPT) 12/08/2022 16  1 - 33 U/L Final   • AST (SGOT) 12/08/2022 17  1 - 32 U/L Final   • Alkaline Phosphatase 12/08/2022 91  39 - 117 U/L Final   • Total Bilirubin 12/08/2022 1.1  0.0 - 1.2 mg/dL  Final   • Globulin 12/08/2022 3.1  gm/dL Final   • A/G Ratio 12/08/2022 1.4  g/dL Final   • BUN/Creatinine Ratio 12/08/2022 30.3 (H)  7.0 - 25.0 Final   • Anion Gap 12/08/2022 9.8  5.0 - 15.0 mmol/L Final   • eGFR 12/08/2022 93.3  >60.0 mL/min/1.73 Final    National Kidney Foundation and American Society of Nephrology (ASN) Task Force recommended calculation based on the Chronic Kidney Disease Epidemiology Collaboration (CKD-EPI) equation refit without adjustment for race.   • Protime 12/08/2022 12.7  11.7 - 14.2 Seconds Final   • INR 12/08/2022 0.95  0.90 - 1.10 Final   • QT Interval 12/08/2022 396  ms Preliminary   • Troponin T 12/08/2022 <0.010  0.000 - 0.030 ng/mL Final   • WBC 12/08/2022 10.43  3.40 - 10.80 10*3/mm3 Final   • RBC 12/08/2022 4.42  3.77 - 5.28 10*6/mm3 Final   • Hemoglobin 12/08/2022 13.0  12.0 - 15.9 g/dL Final   • Hematocrit 12/08/2022 38.8  34.0 - 46.6 % Final   • MCV 12/08/2022 87.8  79.0 - 97.0 fL Final   • MCH 12/08/2022 29.4  26.6 - 33.0 pg Final   • MCHC 12/08/2022 33.5  31.5 - 35.7 g/dL Final   • RDW 12/08/2022 12.3  12.3 - 15.4 % Final   • RDW-SD 12/08/2022 39.2  37.0 - 54.0 fl Final   • MPV 12/08/2022 10.7  6.0 - 12.0 fL Final   • Platelets 12/08/2022 319  140 - 450 10*3/mm3 Final   • Neutrophil % 12/08/2022 63.8  42.7 - 76.0 % Final   • Lymphocyte % 12/08/2022 26.0  19.6 - 45.3 % Final   • Monocyte % 12/08/2022 8.8  5.0 - 12.0 % Final   • Eosinophil % 12/08/2022 0.7  0.3 - 6.2 % Final   • Basophil % 12/08/2022 0.4  0.0 - 1.5 % Final   • Immature Grans % 12/08/2022 0.3  0.0 - 0.5 % Final   • Neutrophils, Absolute 12/08/2022 6.66  1.70 - 7.00 10*3/mm3 Final   • Lymphocytes, Absolute 12/08/2022 2.71  0.70 - 3.10 10*3/mm3 Final   • Monocytes, Absolute 12/08/2022 0.92 (H)  0.10 - 0.90 10*3/mm3 Final   • Eosinophils, Absolute 12/08/2022 0.07  0.00 - 0.40 10*3/mm3 Final   • Basophils, Absolute 12/08/2022 0.04  0.00 - 0.20 10*3/mm3 Final   • Immature Grans, Absolute 12/08/2022 0.03  0.00 -  0.05 10*3/mm3 Final   • nRBC 12/08/2022 0.0  0.0 - 0.2 /100 WBC Final   • Glucose 12/09/2022 94  70 - 130 mg/dL Final    Meter: [id number] : luis Fox RN       DIAGNOSTICS:  CT Head Without Contrast    Result Date: 12/8/2022  New lateral left frontal lobe parenchymal hemorrhage with adjacent vasogenic edema and subarachnoid hemorrhage. I discussed the case with Dr. Brock at around 9:50 PM.  This report was finalized on 12/8/2022 10:43 PM by Dr. Rolando Roberts M.D.          Results Review:   I reviewed the patient's new clinical results.  I personally viewed and interpreted the patient's chart and imaging with Dr. Pimentel.    Vital Signs   Temp:  [98.3 °F (36.8 °C)] 98.3 °F (36.8 °C)  Heart Rate:  [66-96] 79  Resp:  [16-20] 16  BP: (123-147)/(66-89) 129/66    Physical Exam:  Physical Exam  Constitutional:       Appearance: She is ill-appearing.   Pulmonary:      Effort: Pulmonary effort is normal. No respiratory distress.   Neurological:      Mental Status: She is alert.      Motor: Motor strength is normal.       Neurologic Exam     Mental Status   not following commands  moving all extremities spontaneously  left hemifacial weakness  PERRL  EOMI  aphasic, not speaking       Motor Exam     Strength   Strength 5/5 throughout.       Assessment & Plan       Intraparenchymal hemorrhage of brain (HCC)    Patient is a 73 yo female with left frontal hemorrhage and aphasia.     PLAN:     · keep blood pressure less than 150  · CTA to assess for any underlying vascular lesion or expansion of hematoma  · keppra 1 week  · continue ICU care with q1 neuro checks  · left arm raised skin lesion with history of basal cell carcinoma. consider dermatology consult.  · CT C/A/P to rule out metastatic disease      I discussed the patient's findings and my recommendations with Dr. Pimentel, patient, family and nursing staff    Britney Anaya, APRN  12/09/22  09:18 EST    \"Dictated utilizing Dragon dictation\".

## 2022-12-09 NOTE — PROGRESS NOTES
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    Lab Results   Component Value Date    WBC 10.43 2022     Temp Readings from Last 3 Encounters:   22 97.8 °F (36.6 °C) (Oral)   22 97.7 °F (36.5 °C) (Infrared)   22 97.3 °F (36.3 °C)      Assessment/Plan  Estimated CrCl >20 mL/min at this time; BMI 23.4 kg/m2  Will start piperacillin-tazobactam 3.375 g IV every 8 hours     Pharmacy will continue to follow daily while on piperacillin-tazobactam and adjust as needed. Thank you for this consult.    Baldemar Hyde [facility]  Clinical Pharmacist

## 2022-12-09 NOTE — H&P
Patient Care Team:  Josie Youssef MD as PCP - General (Internal Medicine)  Emelyn Bradshaw MD as Surgeon (General Surgery)  Leydi Guillaume MD as Consulting Physician (Psychiatry)  Yaa Ingram MD as Consulting Physician (Obstetrics and Gynecology)  Shruti Ibrahim APRN as Nurse Practitioner (Gastroenterology)      Subjective     Patient is a 72 y.o. female.  Asked admit for intracranial hemorrhage.  Patient apparently went to bed last night about 10 AM normal and this morning when her daughter was talking with her she noticed that she was having problems processing what they were saying.  That has improved apparently patient is having no headaches no motor changes no nausea actually she feels pretty good right now she walked into the hospital.  No recent head falls or trauma now about 4 months ago she did have a fall and had a subarachnoid hemorrhage.  She had recovered from that.  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-   • Tachycardia      Past Surgical History:   Procedure Laterality Date   • COLONOSCOPY N/A 2/5/2018    Procedure: COLONOSCOPY TO CECUM;  Surgeon: Emelyn Bradshaw MD;  Location: [facility] ENDOSCOPY;  Service:    • HYSTERECTOMY     • THYROID LOBECTOMY      approx 1999 or 2000   • THYROIDECTOMY, PARTIAL  2002    Sub-Total Thyroidectomy     Social History     Socioeconomic History   • Marital status:    Tobacco Use   • Smoking status: Former     Packs/day: 1.00      Years: 40.00     Pack years: 40.00     Types: Cigarettes   • Smokeless tobacco: Never   • Tobacco comments:     quit approx 2009   Substance and Sexual Activity   • Alcohol use: Yes     Comment: rarely   • Drug use: No     Family History   Problem Relation Age of Onset   • Dementia Mother    • Breast cancer Mother    • Tuberculosis Father    • Breast cancer Sister    • Mental illness Sister         Family history of suicide   • Prostate cancer Brother          Allergies:  Erythromycin    Medications:  Prior to Admission medications    Medication Sig Start Date End Date Taking? Authorizing Provider   busPIRone (BUSPAR) 10 MG tablet Take 1 tablet by mouth 2 (Two) Times a Day. 3/20/20   Leydi Guillaume MD   desvenlafaxine (PRISTIQ) 50 MG 24 hr tablet Take 50 mg by mouth Every Morning. 4/4/22   ProviderTessa MD   hydrALAZINE (APRESOLINE) 50 MG tablet TAKE 1 TABLET TWICE A DAY  Patient taking differently: 1 tablet Daily. Patient states once a day, at noon 6/20/22   Josie Youssef MD   losartan (COZAAR) 100 MG tablet TAKE 1 TABLET BY MOUTH DAILY 8/26/22   Josie Youssef MD     iopamidol, 100 mL, Intravenous, Once in imaging  levETIRAcetam, 1,000 mg, Intravenous, Q12H           Objective     Vital Signs  Vital Sign Min/Max for last 24 hours  Temp  Min: 98.3 °F (36.8 °C)  Max: 98.3 °F (36.8 °C)   BP  Min: 137/75  Max: 147/89   Pulse  Min: 84  Max: 96   Resp  Min: 18  Max: 18   SpO2  Min: 93 %  Max: 98 %   No data recorded   No data recorded     No intake or output data in the 24 hours ending 12/08/22 2212  No intake/output data recorded.  Last Weight and Admission Weight    There were no vitals filed for this visit.      There is no height or weight on file to calculate BMI.           Physical Exam:  General Appearance: Well-developed white female she is resting comfortably in bed she does not look in acute distress  Eyes: Conjunctiva are clear and anicteric, pupils are equal  ENT: Mucous membranes are moist no  erythema or exudates Mallampati type I airway and nasal septum midline her tongue is fairly large she has indentation of her teeth on both sides  Neck: No adenopathy or thyromegaly no jugular venous tension trachea midline  Lungs: Clear nonlabored symmetric expansion no wheezes rales or rhonchi  Cardiac: Regular rate rhythm no murmur no gallop  Abdomen: Soft nontender no palpable hepatosplenomegaly or masses  : Not examined  Musculoskeletal: Grossly normal  Skin: Warm and dry no jaundice no petechiae  Neuro: She is alert and oriented x3 she can name 6 of 6 objects without difficulty.  She has no facial asymmetry tongue is midline pupils are equal and reactive extraocular muscles are intact and visual fields are intact to confrontation hearing seems to be equal bilaterally.  She has no pronator drift good  and does finger-to-nose to finger without difficulty bilaterally.  She has good dorsiflexion plantarflexion and straight leg raise bilaterally and she has no difficulties with heel-to-shin bilaterally.  Extremities/P Vascular: No clubbing no cyanosis no edema palpable radial and dorsalis pedis pulses  MSE: Pleasant lady she is appropriate      Labs:  Results from last 7 days   Lab Units 12/08/22 2037   GLUCOSE mg/dL 72   SODIUM mmol/L 141   POTASSIUM mmol/L 4.0   CO2 mmol/L 28.2   CHLORIDE mmol/L 103   ANION GAP mmol/L 9.8   CREATININE mg/dL 0.66   BUN mg/dL 20   BUN / CREAT RATIO  30.3*   CALCIUM mg/dL 10.1   ALK PHOS U/L 91   TOTAL PROTEIN g/dL 7.5   ALT (SGPT) U/L 16   AST (SGOT) U/L 17   BILIRUBIN mg/dL 1.1   ALBUMIN g/dL 4.40   GLOBULIN gm/dL 3.1     CrCl cannot be calculated (Unknown ideal weight.).      Results from last 7 days   Lab Units 12/08/22 2037   WBC 10*3/mm3 10.43   RBC 10*6/mm3 4.42   HEMOGLOBIN g/dL 13.0   HEMATOCRIT % 38.8   MCV fL 87.8   MCH pg 29.4   MCHC g/dL 33.5   RDW % 12.3   RDW-SD fl 39.2   MPV fL 10.7   PLATELETS 10*3/mm3 319   NEUTROPHIL % % 63.8   LYMPHOCYTE % % 26.0    MONOCYTES % % 8.8   EOSINOPHIL % % 0.7   BASOPHIL % % 0.4   IMM GRAN % % 0.3   NEUTROS ABS 10*3/mm3 6.66   LYMPHS ABS 10*3/mm3 2.71   MONOS ABS 10*3/mm3 0.92*   EOS ABS 10*3/mm3 0.07   BASOS ABS 10*3/mm3 0.04   IMMATURE GRANS (ABS) 10*3/mm3 0.03   NRBC /100 WBC 0.0         Results from last 7 days   Lab Units 12/08/22 2037   TROPONIN T ng/mL <0.010                 Results from last 7 days   Lab Units 12/08/22 2037   INR  0.95     Microbiology Results (last 10 days)     ** No results found for the last 240 hours. **            Diagnostics:  CT Head Without Contrast    Result Date: 12/8/2022  HEAD CT WITHOUT CONTRAST  HISTORY: New onset of facial droop and difficulty speaking.  TECHNIQUE: Noncontrast head CT is correlated with head CT 09/13/2022 and 08/14/2022.  Radiation dose reduction techniques were utilized, including automated exposure control and exposure modulation based on body size.  FINDINGS: The ventricles are normal in caliber. There is new, acute left lateral frontal parenchymal hemorrhage measuring 3.1 x 2.8 cm axial and about 18 mm long. Adjacent subarachnoid hemorrhage and parenchymal edema are observed with the halo of parenchymal edema measuring about 4 cm diameter. No midline shift or hydrocephalus is present. There is no evidence of brain lesion in this area even in retrospect on the previous CT scans. Heterogeneity of the brain parenchyma elsewhere is again observed. No hemorrhage on the right. Extra-axial spaces otherwise appear normal.      New lateral left frontal lobe parenchymal hemorrhage with adjacent vasogenic edema and subarachnoid hemorrhage. I discussed the case with Dr. Brock at around 9:50 PM.       Results for orders placed during the hospital encounter of 10/25/22    Adult Transthoracic Echo Complete W/ Cont if Necessary Per Protocol    Interpretation Summary  •  Left ventricular systolic function is normal. Calculated left ventricular EF = 58.2%  •  Left ventricular diastolic  function was normal.  •  Normal echo      I did review her CT head and she does have this 3 cm acute left lateral frontal parenchymal hemorrhage there is a little adjacent subarachnoid hemorrhage and there is some parenchymal edema with the halo surrounding the bleed     Assessment & Plan     1. Spontaneous left frontal intraparenchymal hemorrhage sounds like this bleed was well over 12 hours ago and the fact that she is stable to improved is a good sign.  Neurosurgery was consulted from the ED they recommend Keppra, keeping blood pressure under 150 and they will repeat CT scans and probably CT angiograms tomorrow.  She takes no anticoagulants, antiplatelets aspirins etc.  2. Essential hypertension patient is on losartan and on once daily hydralazine if she can take p.o. we will restart the losartan we may need to reevaluate the hydralazine once daily usually is not the best regimen its half-life is too short.  3. Depression we will continue her antidepressants.      Margarito Leahy Jr, MD  12/08/22  22:12 EST    Time:

## 2022-12-09 NOTE — SIGNIFICANT NOTE
12/09/22 1508   OTHER   Discipline physical therapist   Rehab Time/Intention   Session Not Performed unable to evaluate, medical status change  (Pt w neuro status changes this am.  PT will follow up 12/12 pending medical status.)   Recommendation   PT - Next Appointment 12/12/22

## 2022-12-09 NOTE — ED NOTES
PATIENT REPORTS SHE WAS LAST NORMAL AT 2000 LAST NIGHT, REPORTS SHE WOKE UP TODAY HAVING TROUBLE FINDING HER WORDS, AND HER DAUGHTER STATES SHE HAD A NEURO PSYCH EVAL LAST MONTH, STATES SHE WASN'T FOUND TO HAVE ANY DEMENTIA

## 2022-12-09 NOTE — PLAN OF CARE
Goal Outcome Evaluation:              Outcome Evaluation: Orders received, chart reviewed.  Pt with  Intraparenchymal hemorrhage with decline in neruological status this afternoon.  Cortrak placed.  Pt currently off floor for CT of head.  Will f/u for swallow evaluation as appropriate.

## 2022-12-09 NOTE — PROGRESS NOTES
[facility] Acute Rehab  Stroke screening per stroke order set via Epic. Please note that this is a screening. If you feel that this pt is or will be appropriate for Acute Rehab, please call [phone] and a full evaluation will be initiated    Thank You  Ramona Aldrich RN  Acute Rehab Admission Nurse

## 2022-12-09 NOTE — CONSULTS
Nutrition Services    Patient Name:  Karolina Hoyt  YOB: 1950  MRN: [id number]  Admit Date:  12/8/2022    Assessment Date:  12/09/22    Comment: Nutrition consult for TF's.  Pt admitted with Intraparenchymal hemorrhage of brain.  She has started to decline neurologically today.  She vomited in CT scan today and there is concern she may have aspirated. ND Cortrak placed.  Labs, meds, skin reviewed.    Would begin TF's with standard formula, Fibersource HN at 20mL/hr.  Her goal is 60mL/hr and minimal free water flushes.  Provisions below.    Will follow clinical course, nutrition support needs.    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 ENDOSCOPY;  Service:    • HYSTERECTOMY     • THYROID LOBECTOMY      approx 1999 or 2000   • THYROIDECTOMY, PARTIAL  2002    Sub-Total Thyroidectomy        Encounter Information        Nutrition History:     Food Preferences:    Supplements:    Factors Affecting Intake: altered mental status, swallow impairment     Anthropometrics        Current Height  Current Weight  BMI kg/m2 Height: 167 cm (65.75\")  Weight: 66 kg (145 lb 8.1 oz) (12/09/22 1251)  Body mass index is 23.67 kg/m².   Adjusted BMI (if applicable)        Admission Weight 145lb       Ideal Body Weight (IBW) 130lb   Adjusted IBW (if applicable)        Usual Body Weight (UBW) 135-140lb   Weight Change/Trend Gain       Weight History Wt Readings from Last 30 Encounters:    12/09/22 1251 66 kg (145 lb 8.1 oz)   12/09/22 0528 65.8 kg (145 lb)   11/08/22 1225 65.3 kg (144 lb)   11/07/22 1430 65.3 kg (144 lb)   10/25/22 0801 63 kg (138 lb 14.2 oz)   09/21/22 0955 66.2 kg (146 lb)   09/14/22 1430 63 kg (139 lb)   09/13/22 1008 63 kg (139 lb)   08/29/22 1334 66.2 kg (146 lb)   08/14/22 2348 63.5 kg (140 lb)   08/02/22 1258 63 kg (139 lb)   05/17/22 1153 62.1 kg (137 lb)   01/13/22 1513 64 kg (141 lb)   12/02/21 1008 64 kg (141 lb)   11/17/21 1055 60.8 kg (134 lb)   05/17/21 1334 64.4 kg (142 lb)   11/16/20 1438 62.1 kg (137 lb)   09/25/19 1432 56.7 kg (125 lb)   07/10/19 1626 59 kg (130 lb)   06/19/19 1442 58.2 kg (128 lb 3.2 oz)   04/23/19 1406 59.9 kg (132 lb)   02/05/18 0811 63.6 kg (140 lb 3 oz)           --  Tests/Procedures        Tests/Procedures CT scan     Labs       Pertinent Labs    Results from last 7 days   Lab Units 12/09/22  1200 12/08/22 2037   SODIUM mmol/L 138 141   POTASSIUM mmol/L 3.8 4.0   CHLORIDE mmol/L 106 103   CO2 mmol/L 27.0 28.2   BUN mg/dL 12 20   CREATININE mg/dL 0.58 0.66   CALCIUM mg/dL 8.9 10.1   BILIRUBIN mg/dL 1.5* 1.1   ALK PHOS U/L 74 91   ALT (SGPT) U/L 12 16   AST (SGOT) U/L 14 17   GLUCOSE mg/dL 100* 72     Results from last 7 days   Lab Units 12/09/22  1200 12/08/22 2037   HEMOGLOBIN g/dL  --  13.0   HEMATOCRIT %  --  38.8   WBC 10*3/mm3  --  10.43   ALBUMIN g/dL 3.60 4.40     Results from last 7 days   Lab Units 12/08/22 2037   INR  0.95   PLATELETS 10*3/mm3 319     COVID19   Date Value Ref Range Status   09/13/2022 Not Detected Not Detected - Ref. Range Final     No results found for: HGBA1C       Medications           Scheduled Medications busPIRone, 10 mg, Oral, BID  desvenlafaxine, 50 mg, Oral, QAM  docusate sodium, 100 mg, Oral, Daily  iopamidol, 100 mL, Intravenous, Once in imaging  levETIRAcetam, 1,000 mg, Intravenous, Q12H  losartan, 100 mg, Oral, Daily  piperacillin-tazobactam, 3.375 g, Intravenous, Q8H  senna-docusate sodium, 2  tablet, Oral, BID  sodium chloride, 10 mL, Intravenous, Q12H  sodium chloride, 10 mL, Intravenous, Q12H       Infusions niCARdipine, 5-15 mg/hr  sodium chloride, 75 mL/hr, Last Rate: 75 mL/hr (12/09/22 0844)       PRN Medications •  acetaminophen **OR** acetaminophen  •  aluminum-magnesium hydroxide-simethicone  •  senna-docusate sodium **AND** polyethylene glycol **AND** bisacodyl **AND** bisacodyl  •  Calcium Gluconate-NaCl **AND** calcium gluconate IVPB **AND** Calcium  •  fentaNYL citrate (PF)  •  HYDROcodone-acetaminophen  •  labetalol  •  magnesium sulfate **OR** magnesium sulfate **OR** magnesium sulfate  •  ondansetron  •  potassium chloride **OR** potassium chloride **OR** potassium chloride  •  potassium phosphate infusion greater than 15 mMoles **OR** potassium phosphate infusion greater than 15 mMoles **OR** potassium phosphate **OR** sodium phosphate IVPB **OR** sodium phosphate IVPB  •  [COMPLETED] Insert Peripheral IV **AND** sodium chloride  •  sodium chloride  •  sodium chloride  •  sodium chloride  •  sodium chloride     Physical Findings          Physical Appearance confused, Other: groggy   Oral/Mouth Cavity WNL   Edema  no edema   Gastrointestinal vomiting today   Skin  other:lesion on arm, scab   Tubes/Drains Cortrak, ND tube, bridle in place   NFPE Not applicable at this time     Estimated/Assessed Needs       Energy Requirements    Height for Calculation  Height: 167 cm (65.75\")   Weight for Calculation 66kg   Method for Estimation  25 kcal/kg   EST Needs (kcal/day) 1650       Protein Requirements    Weight for Calculation 66kg   EST Protein Needs (g/kg) 1.0 - 1.2 gm/kg   EST Daily Needs (g/day) 66-79       Fluid Requirements     Method for Estimation 1 mL/kcal    Estimated Needs (mL/day) 1600       Fluid Deficit    Current Na Level (mEq/L)    Desired Na Level (mEq/L)    Estimated Fluid Deficit (L)           Current Nutrition Orders & Evaluation of Intake       Oral Nutrition     Food  Allergies NKFA   Current PO Diet NPO Diet NPO Type: Strict NPO   Supplement n/a   PO Evaluation     % PO Intake     # of Days Evaluated    --  PES STATEMENT / NUTRITION DIAGNOSIS      Nutrition Dx Problem  Problem: Needs Alternative Route  Etiology: Medical Diagnosis   Intraparenchymal hemorrhage of brain  Signs/Symptoms: Report/Observation too lethargic for po    Comment:    --  NUTRITION INTERVENTION / PLAN OF CARE      Intervention Goal(s) Maintain nutrition status, Nutrition support treatment, Meet estimated needs, Disease management/therapy, Initiate feeding/diet, Tolerate PO , Initiate TF/PN, Tolerate TF/PN at goal, Transition TF to PO and Maintain weight         RD Intervention/Action Await begin PO diet, Follow Tx Progress, Care plan reviewed and Recommend/ordered:          Prescription/Orders:       PO Diet       Supplements       Snacks       Enteral Nutrition       Parenteral Nutrition    New Prescription Ordered? Yes      Enteral Prescription:     Enteral Route ND    TF Delivery Method Continuous    Enteral Product Fibersource HN    Modular     Propofol Rate/Kcal     TF Start Rate (mL/hr) 20    TF Goal Rate (mL/hr) 60    Free Water Flush (mL) 30mL q 4 hr    TF Provision at Goal: 1728kcal, 78gm protein, 1166mL free water + 180mL water flushes         Calories 105 % needs met         Protein  100 % needs met         Fluid (mL) 1346 mL total     Prescription Ordered Yes         Monitor/Evaluation Per protocol, I&O, Pertinent labs, EN delivery/tolerance, Weight, Skin status, GI status, Symptoms, POC/GOC, Swallow function, Hemodynamic stability   Discharge Plan/Needs Pending clinical course   Education Will instruct as appropriate   --    RD to follow per protocol.      Electronically signed by:  Karmen Patricio RD  12/09/22 13:09 EST

## 2022-12-09 NOTE — ED NOTES
Nursing report ED to floor  Karolina Hoyt  72 y.o.  female    HPI :   Chief Complaint   Patient presents with    Neuro Deficit(s)       Admitting doctor:   Margarito Leahy Jr., MD    Admitting diagnosis:   The encounter diagnosis was Intraparenchymal hemorrhage of brain (HCC).    Code status:   Current Code Status       Date Active Code Status Order ID Comments User Context       12/8/2022 2306 CPR (Attempt to Resuscitate) [id number]  Margarito Leahy Jr., MD ED        Question Answer    Code Status (Patient has no pulse and is not breathing) CPR (Attempt to Resuscitate)    Medical Interventions (Patient has pulse or is breathing) Full Support                    Allergies:   Erythromycin    Isolation:   No active isolations    Intake and Output    Intake/Output Summary (Last 24 hours) at 12/9/2022 0657  Last data filed at 12/9/2022 0656  Gross per 24 hour   Intake 150 ml   Output 0 ml   Net 150 ml       Weight:       12/09/22  0528   Weight: 65.8 kg (145 lb)       Most recent vitals:   Vitals:    12/09/22 0528 12/09/22 0545 12/09/22 0635 12/09/22 0645   BP:  135/77 136/78 133/84   Pulse:  66 66 80   Resp:       Temp:       TempSrc:       SpO2:  95% 96% 96%   Weight: 65.8 kg (145 lb)      Height: 167.6 cm (66\")          Active LDAs/IV Access:   Lines, Drains & Airways       Active LDAs       Name Placement date Placement time Site Days    Peripheral IV 12/08/22 2000 Right Antecubital 12/08/22 2000  Antecubital  less than 1                    Labs (abnormal labs have a star):   Labs Reviewed   COMPREHENSIVE METABOLIC PANEL - Abnormal; Notable for the following components:       Result Value    BUN/Creatinine Ratio 30.3 (*)     All other components within normal limits    Narrative:     GFR Normal >60  Chronic Kidney Disease <60  Kidney Failure <15    The GFR formula is only valid for adults with stable renal function between ages 18 and 70.   CBC WITH AUTO DIFFERENTIAL - Abnormal; Notable for the following  components:    Monocytes, Absolute 0.92 (*)     All other components within normal limits   PROTIME-INR - Normal   TROPONIN (IN-HOUSE) - Normal    Narrative:     Troponin T Reference Range:  <= 0.03 ng/mL-   Negative for AMI  >0.03 ng/mL-     Abnormal for myocardial necrosis.  Clinicians would have to utilize clinical acumen, EKG, Troponin and serial changes to determine if it is an Acute Myocardial Infarction or myocardial injury due to an underlying chronic condition.       Results may be falsely decreased if patient taking Biotin.     POCT GLUCOSE FINGERSTICK - Normal   POCT GLUCOSE FINGERSTICK - Normal   CBC AND DIFFERENTIAL    Narrative:     The following orders were created for panel order CBC & Differential.  Procedure                               Abnormality         Status                     ---------                               -----------         ------                     CBC Auto Differential[[id number]]        Abnormal            Final result                 Please view results for these tests on the individual orders.       EKG:   ECG 12 Lead Stroke Evaluation   Preliminary Result   HEART RATE= 85  bpm   RR Interval= 706  ms   VA Interval= 141  ms   P Horizontal Axis= -16  deg   P Front Axis= 60  deg   QRSD Interval= 105  ms   QT Interval= 396  ms   QRS Axis= 35  deg   T Wave Axis= -2  deg   - ABNORMAL ECG -   Sinus rhythm   LAE, consider biatrial enlargement   Inferior infarct, age indeterminate   Electronically Signed By:    Date and Time of Study: 2022-12-08 20:30:36          Meds given in ED:   Medications   sodium chloride 0.9 % flush 10 mL (has no administration in time range)   iopamidol (ISOVUE-370) 76 % injection 100 mL (0 mL Intravenous Hold 12/8/22 2212)   levETIRAcetam (KEPPRA) injection 1,000 mg (1,000 mg Intravenous Given 12/8/22 2254)   desvenlafaxine (PRISTIQ) 24 hr tablet 50 mg (has no administration in time range)   busPIRone (BUSPAR) tablet 10 mg (0 mg Oral Hold 12/9/22 0044)    losartan (COZAAR) tablet 100 mg (has no administration in time range)   sodium chloride 0.9 % flush 10 mL (10 mL Intravenous Given 12/9/22 0045)   sodium chloride 0.9 % flush 10 mL (has no administration in time range)   sodium chloride 0.9 % infusion 40 mL (has no administration in time range)   potassium chloride (K-DUR,KLOR-CON) ER tablet 40 mEq (has no administration in time range)     Or   potassium chloride (KLOR-CON) packet 40 mEq (has no administration in time range)     Or   potassium chloride 10 mEq in 100 mL IVPB (has no administration in time range)   Magnesium Sulfate 2 gram Bolus, followed by 8 gram infusion (total Mg dose 10 grams)- Mg less than or equal to 1mg/dL (has no administration in time range)     Or   Magnesium Sulfate 2 gram / 50mL Infusion (GIVE X 3 BAGS TO EQUAL 6GM TOTAL DOSE) - Mg 1.1 - 1.5 mg/dl (has no administration in time range)     Or   Magnesium Sulfate 4 gram infusion- Mg 1.6-1.9 mg/dL (has no administration in time range)   potassium phosphate 45 mmol in sodium chloride 0.9 % 500 mL infusion (has no administration in time range)     Or   potassium phosphate 30 mmol in sodium chloride 0.9 % 250 mL infusion (has no administration in time range)     Or   potassium phosphate 15 mmol in sodium chloride 0.9 % 100 mL infusion (has no administration in time range)     Or   sodium phosphates 40 mmol in sodium chloride 0.9 % 500 mL IVPB (has no administration in time range)     Or   sodium phosphates 20 mmol in sodium chloride 0.9 % 250 mL IVPB (has no administration in time range)   calcium gluconate 1g/50ml 0.675% NaCl IV SOLN (has no administration in time range)     And   calcium gluconate 6 g in sodium chloride 0.9 % 500 mL IVPB (has no administration in time range)   niCARdipine (CARDENE) 25 mg in 250 mL NS infusion kit (0 mg/hr Intravenous Hold 12/9/22 0044)   sodium chloride 0.9 % infusion (75 mL/hr Intravenous Currently Infusing 12/9/22 0527)   ondansetron (ZOFRAN) injection 4  mg (has no administration in time range)   sennosides-docusate (PERICOLACE) 8.6-50 MG per tablet 2 tablet (2 tablets Oral Not Given 12/9/22 0043)     And   polyethylene glycol (MIRALAX) packet 17 g (has no administration in time range)     And   bisacodyl (DULCOLAX) EC tablet 5 mg (has no administration in time range)     And   bisacodyl (DULCOLAX) suppository 10 mg (has no administration in time range)   labetalol (NORMODYNE,TRANDATE) injection 20 mg (has no administration in time range)       Imaging results:  CT Head Without Contrast    Result Date: 12/8/2022  New lateral left frontal lobe parenchymal hemorrhage with adjacent vasogenic edema and subarachnoid hemorrhage. I discussed the case with Dr. Brock at around 9:50 PM.  This report was finalized on 12/8/2022 10:43 PM by Dr. Rolando Roberts M.D.       Ambulatory status:   - asst 1    Social issues:   Social History     Socioeconomic History    Marital status:    Tobacco Use    Smoking status: Former     Packs/day: 1.00     Years: 40.00     Pack years: 40.00     Types: Cigarettes    Smokeless tobacco: Never    Tobacco comments:     quit approx 2009   Substance and Sexual Activity    Alcohol use: Yes     Comment: rarely    Drug use: No       NIH Stroke Scale:  Interval: baseline      Ev Lucero RN  12/09/22 06:57 EST

## 2022-12-09 NOTE — ED PROVIDER NOTES
EMERGENCY DEPARTMENT ENCOUNTER    Room Number:  34/34  Date of encounter:  12/8/2022  PCP: Josie Youssef MD  Historian: Patient, daughter and  at bedside    I used full protective equipment while examining this patient.  This includes face mask, gloves and protective eyewear.  I washed my hands before entering the room and immediately upon leaving the room      HPI:  Chief Complaint: Confusion, left facial droop  A complete HPI/ROS/PMH/PSH/SH/FH are unobtainable due to: None    Context: Karolina Hoyt is a 72 y.o. female who presents to the ED c/o confusion, left facial droop.  Patient was last normal prior to going to bed last night before midnight.  This morning family members have noted she has had difficulty \"finding her words).  They also feel that she may have some left facial droop.  Symptoms are mild and worsened by nothing, improved by nothing.  Patient denies fall or trauma.  Denies recent injury.  She has been able to walk and take care of herself normally.      MEDICAL RECORD REVIEW  I reviewed prior medical records including office visit with Dr. Youssef from about 1 month ago.  Patient has a history of hypertension, anxiety and thyroid nodule.  She has had history of traumatic subarachnoid hemorrhage in the August 2022.    PAST MEDICAL HISTORY  Active Ambulatory Problems     Diagnosis Date Noted   • Diverticulosis of large intestine 02/05/2018   • Essential hypertension 04/23/2019   • Anxiety 04/23/2019   • C. 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 ENDOSCOPY;  Service:    • HYSTERECTOMY     • THYROID LOBECTOMY      approx 1999 or 2000   • THYROIDECTOMY, PARTIAL  2002    Sub-Total Thyroidectomy         FAMILY HISTORY  Family History   Problem Relation Age of Onset   • Dementia Mother    • Breast cancer Mother    • Tuberculosis Father    • Breast cancer Sister    • Mental illness Sister         Family history of suicide   • Prostate cancer Brother          SOCIAL HISTORY  Social History     Socioeconomic History   • Marital status:    Tobacco Use   • Smoking status: Former     Packs/day: 1.00     Years: 40.00     Pack years: 40.00     Types: Cigarettes   • Smokeless tobacco: Never   • Tobacco comments:     quit approx 2009   Substance and Sexual Activity   • Alcohol use: Yes     Comment: rarely   • Drug use: No         ALLERGIES  Erythromycin       REVIEW OF SYSTEMS  Review of Systems   Constitutional: Negative.  Negative for fever.   HENT: Negative.  Negative for sore throat.    Eyes: Negative.    Respiratory: Negative.  Negative for cough.    Cardiovascular: Negative.  Negative for chest pain.   Gastrointestinal: Negative.    Genitourinary: Negative.  Negative for dysuria.   Musculoskeletal: Negative.  Negative for back pain.   Skin: Negative.  Negative for rash.   Neurological: Positive for speech difficulty. Negative for headaches.   Psychiatric/Behavioral: Positive for confusion.   All other systems reviewed and are negative.          PHYSICAL EXAM    I have reviewed the triage vital signs and nursing notes.    ED Triage Vitals [12/08/22 1932]   Temp Heart Rate Resp BP SpO2   98.3 °F (36.8 °C) 93 18 -- 98 %      Temp src Heart Rate Source Patient Position BP Location FiO2 (%)   Tympanic Monitor -- -- --       Physical Exam  GENERAL: Alert female no obvious distress.  Triage vitals are reviewed and are unremarkable.  HENT: nares patent, atraumatic  EYES: no scleral  icterus  CV: regular rhythm, regular rate-no murmur  RESPIRATORY: normal effort, clear to auscultation bilateral  ABDOMEN: soft, nontender to palpation  MUSCULOSKELETAL: no deformity-no segment swelling or tenderness to palpation  NEURO: Strength- questionable left facial asymmetry.  Sensation and coordination are grossly intact.  Speech and mentation are unremarkable  SKIN: warm, dry          LAB RESULTS  Recent Results (from the past 24 hour(s))   POC Glucose Once    Collection Time: 12/08/22  7:52 PM    Specimen: Blood   Result Value Ref Range    Glucose 71 70 - 130 mg/dL   ECG 12 Lead Stroke Evaluation    Collection Time: 12/08/22  8:30 PM   Result Value Ref Range    QT Interval 396 ms   Comprehensive Metabolic Panel    Collection Time: 12/08/22  8:37 PM    Specimen: Blood   Result Value Ref Range    Glucose 72 65 - 99 mg/dL    BUN 20 8 - 23 mg/dL    Creatinine 0.66 0.57 - 1.00 mg/dL    Sodium 141 136 - 145 mmol/L    Potassium 4.0 3.5 - 5.2 mmol/L    Chloride 103 98 - 107 mmol/L    CO2 28.2 22.0 - 29.0 mmol/L    Calcium 10.1 8.6 - 10.5 mg/dL    Total Protein 7.5 6.0 - 8.5 g/dL    Albumin 4.40 3.50 - 5.20 g/dL    ALT (SGPT) 16 1 - 33 U/L    AST (SGOT) 17 1 - 32 U/L    Alkaline Phosphatase 91 39 - 117 U/L    Total Bilirubin 1.1 0.0 - 1.2 mg/dL    Globulin 3.1 gm/dL    A/G Ratio 1.4 g/dL    BUN/Creatinine Ratio 30.3 (H) 7.0 - 25.0    Anion Gap 9.8 5.0 - 15.0 mmol/L    eGFR 93.3 >60.0 mL/min/1.73   Protime-INR    Collection Time: 12/08/22  8:37 PM    Specimen: Blood   Result Value Ref Range    Protime 12.7 11.7 - 14.2 Seconds    INR 0.95 0.90 - 1.10   Troponin    Collection Time: 12/08/22  8:37 PM    Specimen: Blood   Result Value Ref Range    Troponin T <0.010 0.000 - 0.030 ng/mL   CBC Auto Differential    Collection Time: 12/08/22  8:37 PM    Specimen: Blood   Result Value Ref Range    WBC 10.43 3.40 - 10.80 10*3/mm3    RBC 4.42 3.77 - 5.28 10*6/mm3    Hemoglobin 13.0 12.0 - 15.9 g/dL    Hematocrit 38.8 34.0 -  46.6 %    MCV 87.8 79.0 - 97.0 fL    MCH 29.4 26.6 - 33.0 pg    MCHC 33.5 31.5 - 35.7 g/dL    RDW 12.3 12.3 - 15.4 %    RDW-SD 39.2 37.0 - 54.0 fl    MPV 10.7 6.0 - 12.0 fL    Platelets 319 140 - 450 10*3/mm3    Neutrophil % 63.8 42.7 - 76.0 %    Lymphocyte % 26.0 19.6 - 45.3 %    Monocyte % 8.8 5.0 - 12.0 %    Eosinophil % 0.7 0.3 - 6.2 %    Basophil % 0.4 0.0 - 1.5 %    Immature Grans % 0.3 0.0 - 0.5 %    Neutrophils, Absolute 6.66 1.70 - 7.00 10*3/mm3    Lymphocytes, Absolute 2.71 0.70 - 3.10 10*3/mm3    Monocytes, Absolute 0.92 (H) 0.10 - 0.90 10*3/mm3    Eosinophils, Absolute 0.07 0.00 - 0.40 10*3/mm3    Basophils, Absolute 0.04 0.00 - 0.20 10*3/mm3    Immature Grans, Absolute 0.03 0.00 - 0.05 10*3/mm3    nRBC 0.0 0.0 - 0.2 /100 WBC       Ordered the above labs and independently reviewed the results.      RADIOLOGY  CT Head Without Contrast    Result Date: 12/8/2022  HEAD CT WITHOUT CONTRAST  HISTORY: New onset of facial droop and difficulty speaking.  TECHNIQUE: Noncontrast head CT is correlated with head CT 09/13/2022 and 08/14/2022.  Radiation dose reduction techniques were utilized, including automated exposure control and exposure modulation based on body size.  FINDINGS: The ventricles are normal in caliber. There is new, acute left lateral frontal parenchymal hemorrhage measuring 3.1 x 2.8 cm axial and about 18 mm long. Adjacent subarachnoid hemorrhage and parenchymal edema are observed with the halo of parenchymal edema measuring about 4 cm diameter. No midline shift or hydrocephalus is present. There is no evidence of brain lesion in this area even in retrospect on the previous CT scans. Heterogeneity of the brain parenchyma elsewhere is again observed. No hemorrhage on the right. Extra-axial spaces otherwise appear normal.      New lateral left frontal lobe parenchymal hemorrhage with adjacent vasogenic edema and subarachnoid hemorrhage. I discussed the case with Dr. Brock at around 9:50 PM.          I ordered the above noted radiological studies. Reviewed by me and discussed with radiologist.  See dictation for official radiology interpretation.      PROCEDURES  Critical Care  Performed by: Sharad Brock MD  Authorized by: Sharad Brock MD     Critical care provider statement:     Critical care time (minutes):  45    Critical care time was exclusive of:  Separately billable procedures and treating other patients    Critical care was necessary to treat or prevent imminent or life-threatening deterioration of the following conditions:  CNS failure or compromise    Critical care was time spent personally by me on the following activities:  Discussions with consultants, evaluation of patient's response to treatment, examination of patient, obtaining history from patient or surrogate, re-evaluation of patient's condition, review of old charts, ordering and review of radiographic studies, ordering and review of laboratory studies, pulse oximetry and ordering and performing treatments and interventions          MEDICATIONS GIVEN IN ER    Medications   sodium chloride 0.9 % flush 10 mL (has no administration in time range)   iopamidol (ISOVUE-370) 76 % injection 100 mL (0 mL Intravenous Hold 12/8/22 2212)   levETIRAcetam (KEPPRA) injection 1,000 mg (has no administration in time range)         PROGRESS, DATA ANALYSIS, CONSULTS, AND MEDICAL DECISION MAKING    All labs have been independently reviewed by me.  All radiology studies have been reviewed by me and discussed with radiologist dictating the report.   EKG's independently viewed and interpreted by me.  Discussion below represents my analysis of pertinent findings related to patient's condition, differential diagnosis, treatment plan and final disposition.      ED Course as of 12/08/22 2214   Thu Dec 08, 2022   2008 [id number]-old female with history of prior traumatic subarachnoid hemorrhage about 4 months ago presents with some unusual neurologic  symptoms.  Family concerned that she has had difficulty finding some of her words.  I also feel that she may have some facial droop.    On exam patient has some mild confusion and cannot recall the year.  She certainly can answer questions and follow commands.  She does seem to have some confusion and is slow to answer questions.  I really do not detect definitive aphasia but more confusion suggestive of encephalopathy.  In regard to the face there may be some subtle asymmetry but I do not appreciate significant weakness there. [DB]   2010 EKG          EKG time: 2030  Rhythm/Rate: Sinus 85  P waves and ME: Normal P waves and ME interval  QRS, axis: Normal axis, normal ME  ST and T waves: Unremarkable ST and T wave    Interpreted Contemporaneously by me, independently viewed  Not significantly changed compared to prior 3/2022   [DB]   2205 I discussed evaluation and CT imaging with Dr. Laci Cline.  I sent him images of the hemorrhage and he will call me back [DB]   2205 I discussed head CT with Dr. Rolando Roberts.  There is a moderately large right right intraparenchymal hemorrhage. [DB]   2207 I discussed CT images again with Dr. Cline.  He would like to admit patient to the ICU.  IV Keppra for seizure prophylaxis.  Would like to keep systolic blood pressure less than 150 using Cardene (blood pressure running under 150s consistently here).  He thinks the hemorrhage may be related to amyloid pathology.  Would like to get a CT angiogram in the morning. [DB]      ED Course User Index  [DB] Sharad Brock MD       AS OF 22:14 EST VITALS:    BP - 142/78  HR - 87  TEMP - 98.3 °F (36.8 °C) (Tympanic)  O2 SATS - 93%      DIAGNOSIS  Final diagnoses:   Intraparenchymal hemorrhage of brain (HCC)         DISPOSITION  Admission         Sharad Brock MD  12/08/22 0333

## 2022-12-09 NOTE — ED NOTES
Pt arrived by PV reports left side facial droop starting this AM. Pt disoriented to time.     Patient was placed in face mask during first look triage.  Patient was wearing a face mask throughout encounter.  I wore personal protective equipment throughout the encounter.  Hand hygiene was performed before and after patient encounter.

## 2022-12-09 NOTE — PROGRESS NOTES
[facility] INPATIENT PROGRESS NOTE         [facility] INTENSIVE CARE    2022      PATIENT IDENTIFICATION:  Name: Karolina Hoyt ADMIT: 2022   : 1950  PCP: Josie Youssef MD    MRN: [id number] LOS: 1 days   AGE/SEX: 72 y.o. female  ROOM: [facility]                     LOS 1    Reason for visit: Acute stroke      SUBJECTIVE:      Discussed with patient and family at bedside.  Family thinks that she is a little slower today than she was when she first having trouble finding words. I am seeing the patient for the first time today.  All patient problems are new to me.      Objective   OBJECTIVE:    Vital Sign Min/Max for last 24 hours  Temp  Min: 98.3 °F (36.8 °C)  Max: 98.3 °F (36.8 °C)   BP  Min: 123/82  Max: 147/89   Pulse  Min: 66  Max: 96   Resp  Min: 16  Max: 20   SpO2  Min: 93 %  Max: 98 %   No data recorded   Weight  Min: 65.8 kg (145 lb)  Max: 65.8 kg (145 lb)                         Body mass index is 23.4 kg/m².    Intake/Output Summary (Last 24 hours) at 2022 0909  Last data filed at 2022 0656  Gross per 24 hour   Intake 150 ml   Output 0 ml   Net 150 ml         Exam:  GEN:  No distress, appears stated age  EYES:   PERRL, anicteric sclerae  ENT:    External ears/nose normal, OP clear  NECK:  No adenopathy, midline trachea  LUNGS: Normal chest on inspection, palpation and auscultation  CV:  Normal S1S2, without murmur  ABD:  Nontender, nondistended, no hepatosplenomegaly, +BS  EXT:  No edema.  No cyanosis or clubbing.  No mottling and normal cap refill.    Assessment     Scheduled meds:  busPIRone, 10 mg, Oral, BID  desvenlafaxine, 50 mg, Oral, QAM  iopamidol, 100 mL, Intravenous, Once in imaging  levETIRAcetam, 1,000 mg, Intravenous, Q12H  losartan, 100 mg, Oral, Daily  senna-docusate sodium, 2 tablet, Oral, BID  sodium chloride, 10 mL, Intravenous, Q12H      IV meds:                      niCARdipine, 5-15 mg/hr, Last Rate: Stopped (22 0044)  sodium chloride, 75  mL/hr, Last Rate: 75 mL/hr (12/09/22 0844)      Data Review:  Results from last 7 days   Lab Units 12/08/22 2037   SODIUM mmol/L 141   POTASSIUM mmol/L 4.0   CHLORIDE mmol/L 103   CO2 mmol/L 28.2   BUN mg/dL 20   CREATININE mg/dL 0.66   GLUCOSE mg/dL 72   CALCIUM mg/dL 10.1         Estimated Creatinine Clearance: 80 mL/min (by C-G formula based on SCr of 0.66 mg/dL).  Results from last 7 days   Lab Units 12/08/22 2037   WBC 10*3/mm3 10.43   HEMOGLOBIN g/dL 13.0   PLATELETS 10*3/mm3 319     Results from last 7 days   Lab Units 12/08/22 2037   INR  0.95     Results from last 7 days   Lab Units 12/08/22 2037   ALT (SGPT) U/L 16   AST (SGOT) U/L 17                 Glucose   Date/Time Value Ref Range Status   12/09/2022 0053 94 70 - 130 mg/dL Final     Comment:     Meter: [id number] : luis Fox RN   12/08/2022 1952 71 70 - 130 mg/dL Final     Comment:     Meter: [id number] : [id number] Quinn Calvo CNA       CT head 12/8 reviewed showed left frontal lobe parenchymal hemorrhage with vasogenic edema and subarachnoid hemorrhage.                Active Hospital Problems    Diagnosis  POA   • **Intraparenchymal hemorrhage of brain (HCC) [I61.9]  Yes      Resolved Hospital Problems   No resolved problems to display.         ASSESSMENT:    Spontaneous left frontal intraparenchymal hemorrhage: Possible amyloid  Hypertension  Depression    PLAN:    Patient looks worse according to family.  Slower to respond than yesterday evening.  Add posthemorrhagic stroke order set.  Neurosurgery input pending.  Defer timing of repeat CT head to neurosurgery.  Control glucose.  Control blood pressure.  Cardene drip as needed.  Mechanical DVT prophylaxis.  PT/ST/OT when appropriate.    Discussed with multidisciplinary ICU team on rounds this morning.    Discussed with patient's daughter at bedside.        CCT: 34 min    Elgin Ram MD  Pulmonary and Critical Care Medicine  [facility],  [facility]  12/9/2022    09:09 EST     Addendum:  Clearly aspirated in CT scan.  Add zosyn for asp PNA.  CT head looks worse.  Place cortrak and make NPO.    Electronically signed by Elgin Ram MD, 12/09/22, 10:13 AM EST.

## 2022-12-09 NOTE — NURSING NOTE
Patient is not responding to orientation questions with the 0900 assessment as with the 0800 assessment.  Dr. Pimentel is present at bedside.  A routine CT was already ordered; went to CT.  Patient appears more lethargic.     No

## 2022-12-10 NOTE — PLAN OF CARE
Goal Outcome Evaluation:               Discussed with RN. Patient not appropriate for swallow eval at this time secondary to mental status changes. SLP to follow for eval as appropriate.

## 2022-12-10 NOTE — SIGNIFICANT NOTE
12/10/22 0903   OTHER   Discipline occupational therapist   Rehab Time/Intention   Session Not Performed patient unavailable for evaluation;unable to evaluate, medical status change  (pt off floor to CT.  noted repeated head CT's with neuro change since OT ordered. Will hold today and follow up 12/12 for status.)   Recommendation   OT - Next Appointment 12/12/22

## 2022-12-10 NOTE — PROGRESS NOTES
Neurosurgery progress note    Post bleed day #1 s/p left frontal hemorrhage    Subjective: No events reported overnight.  Continues to have difficulty speaking and following commands    Objective:  Vitals:    12/10/22 0930 12/10/22 1000 12/10/22 1100 12/10/22 1130   BP: 133/75 127/74 141/79 132/80   BP Location:       Patient Position:       Pulse: 76 64 73 72   Resp:       Temp:       TempSrc:       SpO2: 95% 96% 94% 96%   Weight:       Height:           Physical exam  Global aphasia.  Not speaking.  Not following commands  Eyes open spontaneously  Pupils equal round reactive to light  Extraocular muscles intact  Face symmetric  Motor exam  Unable to perform formal motor exam due to aphasia, but does not appear to have any focal motor deficits.  She is able to spontaneously move all 4 extremities and hold them up gravity without difficulty  Gait deferred      Assessment/plan  72-year-old female post bleed day #1 s/p large left frontal hemorrhage  -The repeat CT head this morning appears stable.  No further increase in midline shift.  At the time of my exam she was awake and alert with no new motor deficits.  Recommend continue to monitor closely with every hour neurochecks.  Please call if any change in her neurologic exam and we will consider a repeat CT head and possible craniotomy to evacuate hematoma  -Recommend maintain systolic blood pressure less than 150 mmHg  -Keppra 500 mg twice daily for 1 week for seizure prophylaxis  -CT chest shows no evidence of metastatic disease.  CT abdomen and pelvis pending

## 2022-12-10 NOTE — PROGRESS NOTES
LOS: 2 days   Patient Care Team:  Josie Youssef MD as PCP - General (Internal Medicine)  Emelyn Bradshaw MD as Surgeon (General Surgery)  Leydi Guillaume MD as Consulting Physician (Psychiatry)  Yaa Ingram MD as Consulting Physician (Obstetrics and Gynecology)  Shruti Ibrahim APRN as Nurse Practitioner (Gastroenterology)    Chief Complaint:  F/up intracranial hemorrhage, uncontrolled hypertension and medical problems as below.    Subjective   Interval History  I reviewed the admission note, progress notes, PMH, PSH, Family hx, social history, imagings and prior records on this admission, summarized the finding in my note and formulated a transition of care plan.      Patient is aphasic.  When seen, she was asleep and she had significant snoring.  Her tongue was protruding.  She does wake up to verbal stimuli and sometimes follow commands.  She was noted to move all her extremities but significantly weak overall especially in her right arm.    REVIEW OF SYSTEMS:   Cannot obtain due to aphasia.    Ventilator/Non-Invasive Ventilation Settings (From admission, onward)    None                Physical Exam:     Vital Signs  Temp:  [97.6 °F (36.4 °C)-98.7 °F (37.1 °C)] 98.7 °F (37.1 °C)  Heart Rate:  [60-84] 79  Resp:  [16-18] 16  BP: (116-155)/(63-93) 132/65    Intake/Output Summary (Last 24 hours) at 12/10/2022 0725  Last data filed at 12/10/2022 0546  Gross per 24 hour   Intake 1634 ml   Output 800 ml   Net 834 ml     Flowsheet Rows    Flowsheet Row First Filed Value   Admission Height 167.6 cm (66\") Documented at 12/09/2022 0528   Admission Weight 65.8 kg (145 lb) Documented at 12/09/2022 0528          PPE used per hospital policy    General Appearance:   Alert, cooperative, in no acute distress   ENMT:  Mallampati score 4, moist mucous membrane   Eyes:  Pupils equal and reactive to light. EOMI   Neck:   Trachea midline. No thyromegaly.   Lungs:    Clear to  auscultation,respirations regular, even and nonlabored    Heart:   Regular rhythm and normal rate, normal S1 and S2, no         murmur   Skin:   No rash or ecchymosis   Abdomen:    Obese. Soft. No tenderness. No HSM.   Neuro/psych:  Conscious, alert.  Moves all extremities.  Right arm 3/5.  Left arm 4/5.  Legs 2/5 bilaterally   Extremities:  No cyanosis, clubbing or edema.  Warm extremities and well-perfused          Results Review:        Results from last 7 days   Lab Units 12/10/22  0343 12/09/22  1200 12/08/22 2037   SODIUM mmol/L 140 138 141   POTASSIUM mmol/L 3.9 3.8 4.0   CHLORIDE mmol/L 107 106 103   CO2 mmol/L 23.1 27.0 28.2   BUN mg/dL 12 12 20   CREATININE mg/dL 0.59 0.58 0.66   GLUCOSE mg/dL 89 100* 72   CALCIUM mg/dL 8.9 8.9 10.1     Results from last 7 days   Lab Units 12/08/22 2037   TROPONIN T ng/mL <0.010     Results from last 7 days   Lab Units 12/10/22  0343 12/09/22  1200 12/08/22 2037   WBC 10*3/mm3 7.77 9.87 10.43   HEMOGLOBIN g/dL 11.4* 11.0* 13.0   HEMATOCRIT % 34.3 33.0* 38.8   PLATELETS 10*3/mm3 262 257 319     Results from last 7 days   Lab Units 12/08/22 2037   INR  0.95                               I reviewed the patient's new clinical results.        Medication Review:   busPIRone, 10 mg, Oral, BID  desvenlafaxine, 50 mg, Oral, QAM  docusate sodium, 100 mg, Oral, Daily  iopamidol, 100 mL, Intravenous, Once in imaging  levETIRAcetam, 1,000 mg, Intravenous, Q12H  losartan, 100 mg, Oral, Daily  piperacillin-tazobactam, 3.375 g, Intravenous, Q8H  senna-docusate sodium, 2 tablet, Oral, BID  sodium chloride, 10 mL, Intravenous, Q12H  sodium chloride, 10 mL, Intravenous, Q12H        niCARdipine, 5-15 mg/hr  sodium chloride, 75 mL/hr, Last Rate: 75 mL/hr (12/09/22 2316)        Diagnostic imaging:  I personally and independently reviewed the following images:  CT chest 12/9/2022: Bilateral patchy opacities in the apices and subtle infiltrates bilaterally in the lower lobes.      CT brain  12/9/2022: Expanding left frontal hematoma    Assessment   1. Spontaneous left frontal intraparenchymal hemorrhage  2. Brain edema and midline shift  3. Uncontrolled hypertension  4. Depression  5. Left arm skin lesion  6. Aspiration  7. Dysphagia  8. Mild anemia  9. Bilateral pulmonary infiltrates: In the apices (could represent scarring) and subtle infiltrates in the lower lobes bilaterally.  Could be related to aspiration.  10. History of basal cell carcinoma        All problems new to me    Plan     · Cardene drip to keep SBP <150  · Every neurochecks  · CT brain this a.m.  · Keppra 1000 mg twice daily for 1 week  · Zosyn for suspected aspiration pneumonia.  Check Pro-Federico.  · Requires MRI brain at some point to evaluate for malignancy  · Requires dermatology consult for the lesion in her left arm  · Head of bed elevation.  Aspiration precaution.  · Core track and tube feeding.  · Maintenance IV fluid with normal saline    Discussed with her daughter at bedside.    Addendum:  Patient received a dose of MiraLAX through the NG tube and and she was noted to be slightly tachypneic and had coarse breath sounds and therefore KUB was obtained to ensure adequate placement of the NG tube and it was reviewed independently and the NG tube was in good position.    Repeat CT brain this a.m. reviewed and discussed with Dr. Pimentel.  No change compared to previously.  Continue supportive care for now.  No indication for surgical intervention unless significant change in mental status.    Velma Jarrett MD  12/10/22  07:25 EST      Time: Critical care 32 min      This note was dictated utilizing [employer] dictation

## 2022-12-10 NOTE — PLAN OF CARE
Goal Outcome Evaluation:  Plan of Care Reviewed With: patient, daughter         VSS, NIH 15 (see charting), pt sleepy but wakes every hour and is able to follow small commands, only withdraws on R lower extremity, mute, slight left mouth droop. , see AM labs

## 2022-12-11 NOTE — PROGRESS NOTES
LOS: 3 days   Patient Care Team:  Josie Youssef MD as PCP - General (Internal Medicine)  Emelyn Bradshaw MD as Surgeon (General Surgery)  Leydi Guillaume MD as Consulting Physician (Psychiatry)  Yaa Ingram MD as Consulting Physician (Obstetrics and Gynecology)  Shruti Ibrahim APRN as Nurse Practitioner (Gastroenterology)    Chief Complaint:  F/up intracranial hemorrhage, uncontrolled hypertension and medical problems as below.    Subjective   Interval History  No change in mental status.  Still profoundly aphasic.    She was able to squeeze with her left arm on assessment today but otherwise did not cooperate.  She was withdrawing all her extremities to stimulus.    Tube feeding initiated and tolerated.  No BM yet    REVIEW OF SYSTEMS:   Cannot obtain due to aphasia.    Ventilator/Non-Invasive Ventilation Settings (From admission, onward)    None                Physical Exam:     Vital Signs  Temp:  [98.4 °F (36.9 °C)-100.2 °F (37.9 °C)] 98.4 °F (36.9 °C)  Heart Rate:  [62-91] 78  Resp:  [16-20] 20  BP: (127-151)/() 144/77    Intake/Output Summary (Last 24 hours) at 12/11/2022 0949  Last data filed at 12/11/2022 0500  Gross per 24 hour   Intake 2346 ml   Output 1000 ml   Net 1346 ml     Flowsheet Rows    Flowsheet Row First Filed Value   Admission Height 167.6 cm (66\") Documented at 12/09/2022 0528   Admission Weight 65.8 kg (145 lb) Documented at 12/09/2022 0528          PPE used per hospital policy    General Appearance:   Alert, not cooperative, in no acute distress   ENMT:  Mallampati score 4, moist mucous membrane   Eyes:  Pupils equal and reactive to light. EOMI   Neck:   Trachea midline. No thyromegaly.   Lungs:    Clear to auscultation,respirations regular, even and nonlabored    Heart:   Regular rhythm and normal rate, systolic murmur over the right sternal border grade 3/6 with radiation   Skin:   No rash or ecchymosis   Abdomen:    Obese.  Soft. No tenderness. No HSM.   Neuro/psych:  Conscious, alert.  Intermittently cooperative.  She squeezed with her left arm only and withdraw all her extremities to pain   Extremities:  No cyanosis, clubbing or edema.  Warm extremities and well-perfused          Results Review:        Results from last 7 days   Lab Units 12/11/22  0501 12/10/22  0343 12/09/22  1200   SODIUM mmol/L 137 140 138   POTASSIUM mmol/L 4.2 3.9 3.8   CHLORIDE mmol/L 106 107 106   CO2 mmol/L 23.7 23.1 27.0   BUN mg/dL 9 12 12   CREATININE mg/dL 0.61 0.59 0.58   GLUCOSE mg/dL 100* 89 100*   CALCIUM mg/dL 8.3* 8.9 8.9     Results from last 7 days   Lab Units 12/08/22 2037   TROPONIN T ng/mL <0.010     Results from last 7 days   Lab Units 12/11/22  0501 12/10/22  0343 12/09/22  1200   WBC 10*3/mm3 9.15 7.77 9.87   HEMOGLOBIN g/dL 11.0* 11.4* 11.0*   HEMATOCRIT % 33.8* 34.3 33.0*   PLATELETS 10*3/mm3 238 262 257     Results from last 7 days   Lab Units 12/08/22 2037   INR  0.95                               I reviewed the patient's new clinical results.        Medication Review:   busPIRone, 10 mg, Nasogastric, BID  desvenlafaxine, 50 mg, Oral, QAM  docusate sodium, 100 mg, Nasogastric, Daily  iopamidol, 100 mL, Intravenous, Once in imaging  levETIRAcetam, 1,000 mg, Intravenous, Q12H  losartan, 100 mg, Nasogastric, Daily  piperacillin-tazobactam, 3.375 g, Intravenous, Q8H  senna-docusate sodium, 2 tablet, Nasogastric, BID  sodium chloride, 10 mL, Intravenous, Q12H  sodium chloride, 10 mL, Intravenous, Q12H        niCARdipine, 5-15 mg/hr  sodium chloride, 75 mL/hr, Last Rate: 75 mL/hr (12/09/22 2316)        Diagnostic imaging:  I personally and independently reviewed the following images:  CT chest 12/9/2022: Bilateral patchy opacities in the apices and subtle infiltrates bilaterally in the lower lobes.      CT brain 12/9/2022: Expanding left frontal hematoma    CT brain 12/11/2022: No change    Assessment   1. Spontaneous left frontal  intraparenchymal hemorrhage  2. Brain edema and midline shift  3. Uncontrolled hypertension  4. Depression  5. Left arm skin lesion  6. Aspiration  7. Dysphagia  8. Mild anemia  9. Bilateral pulmonary infiltrates: In the apices (could represent scarring) and subtle infiltrates in the lower lobes bilaterally.  Could be related to aspiration.  10. History of basal cell carcinoma          Plan     · Cardene drip to keep SBP <150  · Neurochecks per protocol  · CT brain this a.m.  · Keppra 1000 mg twice daily for 1 week  · Zosyn for suspected aspiration pneumonia.  Pro-Federico pending  · Requires MRI brain at some point to evaluate for malignancy  · Requires dermatology consult for the lesion in her left arm  · Head of bed elevation.  Aspiration precaution.  · Stop IV fluid  · Tube feeding    Discussed with her daughter at bedside.    .    Velma Jarrett MD  12/11/22  09:49 EST            This note was dictated utilizing [employer] dictation

## 2022-12-11 NOTE — PROGRESS NOTES
Neurosurgery progress note    Post bleed day #2 s/p left frontal hemorrhage    Subjective: No events reported overnight.  Continues to have difficulty speaking and following commands as expected    Objective:  Vitals:    12/11/22 0800 12/11/22 0900 12/11/22 1030 12/11/22 1100   BP: 141/74 144/77  133/80   BP Location: Left arm      Patient Position: Lying      Pulse: 85 78 78 63   Resp: 20      Temp: 98.4 °F (36.9 °C)      TempSrc: Oral      SpO2: 96% 96% 95% 94%   Weight:       Height:             Physical exam  Global aphasia.  Not speaking.  Not following commands  Eyes open spontaneously  Pupils equal round reactive to light  Extraocular muscles intact  Face symmetric  Motor exam  Unable to perform formal motor exam due to aphasia, but does not appear to have any focal motor deficits.  She is able to spontaneously move all 4 extremities and hold them up gravity without difficulty  Gait deferred        Assessment/plan  72-year-old female post bleed day #2 with a large left frontal hemorrhage  -Neuro stable.  No changes overnight.    -Please call with any changes in neurologic exam  -Recommend continue to maintain systolic blood pressure less than 150 mmHg.  -Plan to repeat CT head tomorrow or earlier if change in neurologic exam  -Continue with Keppra 500 mg twice daily for seizure prophylaxis for 1 week from the time of hemorrhage

## 2022-12-11 NOTE — NURSING NOTE
Mrs. Hoyt is arousable to voice and will obey commands by squeezing her left hand intermittently.  She withdrawals with the other three extremities.  Pupils are equal and brisk.  She had one bowel movement and had 700 mL of urine output.  CT of abdomen and pelvis completed today.  Last blood sugar check was 153.  No history of diabetes.  Dr. Jarrett was informed.

## 2022-12-11 NOTE — PLAN OF CARE
Goal Outcome Evaluation:      VSS, UOP, no overt neuro changes, AM CT showed no change (see results),

## 2022-12-12 NOTE — PROGRESS NOTES
Neurosurgery progress note    Post bleed day #2 s/p left frontal hemorrhage    Subjective: No events reported overnight.  Continues to have difficulty speaking and following commands as expected    Objective:  Vitals:    12/12/22 0500 12/12/22 0523 12/12/22 0700 12/12/22 0800   BP: 137/90  122/69 121/75   Pulse: 74  67 71   Resp:       Temp:   98.4 °F (36.9 °C)    TempSrc:   Oral    SpO2: 95%  97% 97%   Weight:  71.3 kg (157 lb 3 oz)     Height:             Physical exam  Global aphasia  eyes open spontaneously  EOMI  face symmetric  unable to preform motor exam due to aphasia but does not appear to have any focal motor deficits. She is able to spontaneously move all 4 extremities and hold them up.  gait deferred        Assessment/plan  72-year-old female post bleed day #3 with a large left frontal hemorrhage  -Neuro stable.  No changes overnight.    -Please call with any changes in neurologic exam  -Recommend continue to maintain systolic blood pressure less than 150 mmHg.  -check MRI brain  -Continue with Keppra 500 mg twice daily for seizure prophylaxis for 1 week from the time of hemorrhage      Britney Anaya, APRN  12/12/22  1000

## 2022-12-12 NOTE — PROGRESS NOTES
LOS: 4 days   Patient Care Team:  Josie Youssef MD as PCP - General (Internal Medicine)  Emelyn Bradshaw MD as Surgeon (General Surgery)  Leydi Guillaume MD as Consulting Physician (Psychiatry)  Yaa Ingram MD as Consulting Physician (Obstetrics and Gynecology)  Shruti Ibrahim APRN as Nurse Practitioner (Gastroenterology)    Chief Complaint:  F/up intracranial hemorrhage, uncontrolled hypertension and medical problems as below.    Subjective   Interval History  No change in mental status.  Still profoundly aphasic.  No reports of seizure.    Her son was at bedside today and stated that when he talked to her yesterday, she was smiling and laughing.  He thinks that she was understanding what he said.    Tolerating TF      REVIEW OF SYSTEMS:   Cannot obtain due to aphasia.    Ventilator/Non-Invasive Ventilation Settings (From admission, onward)    None                Physical Exam:     Vital Signs  Temp:  [97.9 °F (36.6 °C)-98.8 °F (37.1 °C)] 98.4 °F (36.9 °C)  Heart Rate:  [60-79] 71  Resp:  [16-18] 16  BP: (106-146)/(60-90) 121/75    Intake/Output Summary (Last 24 hours) at 12/12/2022 0933  Last data filed at 12/12/2022 0523  Gross per 24 hour   Intake 3025 ml   Output 1600 ml   Net 1425 ml     Flowsheet Rows    Flowsheet Row First Filed Value   Admission Height 167.6 cm (66\") Documented at 12/09/2022 0528   Admission Weight 65.8 kg (145 lb) Documented at 12/09/2022 0528          PPE used per hospital policy    General Appearance:   Alert, intermittently cooperative, in no acute distress   ENMT:  Mallampati score 4, moist mucous membrane   Eyes:  Pupils equal and reactive to light. EOMI   Neck:   Trachea midline. No thyromegaly.   Lungs:    Clear to auscultation,respirations regular, even and nonlabored    Heart:   Regular rhythm and normal rate, systolic murmur over the right sternal border grade 3/6 with radiation   Skin:   No rash or ecchymosis   Abdomen:     Obese. Soft. No tenderness. No HSM.   Neuro/psych:  Conscious, alert.  Intermittently cooperative.  She squeezed with her left arm only and withdraw all her extremities to pain   Extremities:  No cyanosis, clubbing or edema.  Warm extremities and well-perfused          Results Review:        Results from last 7 days   Lab Units 12/12/22  0607 12/11/22  0501 12/10/22  0343   SODIUM mmol/L 137 137 140   POTASSIUM mmol/L 3.7 4.2 3.9   CHLORIDE mmol/L 103 106 107   CO2 mmol/L 28.2 23.7 23.1   BUN mg/dL 9 9 12   CREATININE mg/dL 0.59 0.61 0.59   GLUCOSE mg/dL 160* 100* 89   CALCIUM mg/dL 8.8 8.3* 8.9     Results from last 7 days   Lab Units 12/08/22 2037   TROPONIN T ng/mL <0.010     Results from last 7 days   Lab Units 12/12/22  0411 12/11/22  0501 12/10/22  0343   WBC 10*3/mm3 10.40 9.15 7.77   HEMOGLOBIN g/dL 11.5* 11.0* 11.4*   HEMATOCRIT % 35.0 33.8* 34.3   PLATELETS 10*3/mm3 197 238 262     Results from last 7 days   Lab Units 12/08/22 2037   INR  0.95                               I reviewed the patient's new clinical results.        Medication Review:   busPIRone, 10 mg, Nasogastric, BID  desvenlafaxine, 50 mg, Oral, QAM  docusate sodium, 100 mg, Nasogastric, Daily  iopamidol, 100 mL, Intravenous, Once in imaging  levETIRAcetam, 1,000 mg, Intravenous, Q12H  losartan, 100 mg, Nasogastric, Daily  piperacillin-tazobactam, 3.375 g, Intravenous, Q8H  senna-docusate sodium, 2 tablet, Nasogastric, BID  sodium chloride, 10 mL, Intravenous, Q12H  sodium chloride, 10 mL, Intravenous, Q12H        niCARdipine, 5-15 mg/hr  sodium chloride, 75 mL/hr, Last Rate: 75 mL/hr (12/12/22 0523)        Diagnostic imaging:  I personally and independently reviewed the following images:  CT chest 12/9/2022: Bilateral patchy opacities in the apices and subtle infiltrates bilaterally in the lower lobes.      CT brain 12/9/2022: Expanding left frontal hematoma    CT brain 12/11/2022: No change    CT brain 12/12/2022: No  change.    Assessment   1. Spontaneous left frontal intraparenchymal hemorrhage  2. Brain edema and midline shift  3. Uncontrolled hypertension  4. Depression  5. Left arm skin lesion  6. Aspiration  7. Dysphagia  8. Mild anemia  9. Bilateral pulmonary infiltrates: In the apices (could represent scarring) and subtle infiltrates in the lower lobes bilaterally.  Could be related to aspiration.  10. History of basal cell carcinoma          Plan     · Cardene drip to keep SBP <150  · Neurochecks per protocol  · CT brain this a.m.  · Keppra, change to 500 mg twice daily, stop date 12/14 (7 days since hemorrhage)  · Change Zosyn to total of 5 days only.  There is no evidence of fever or leukocytosis or other signs of pneumonia.  Pro-Federico normal.  However, subtle pulmonary infiltrates noted on CT  · Requires MRI brain at some point to evaluate for malignancy  · Consult dermatology for the left arm lesion  · Head of bed elevation.  Aspiration precaution.  · Tube feeding, titrate to goal.  · Bowel regimen    Discussed with her daughter at bedside.    .    Velma Jarrett MD  12/12/22  09:33 EST            This note was dictated utilizing [employer] dictation

## 2022-12-12 NOTE — PLAN OF CARE
Goal Outcome Evaluation:      Pt neuro status seems marginally better this shift.  She can occasionally move her lower extremities spontaneously, but not to command.  She seems to have decent control of her LUE but can still cannot move the RUE. Pt still nonverbal and sometimes follows commands.  AM CT shows no interval changes. Adequate UOP, TF at goal and tolerating.

## 2022-12-12 NOTE — PLAN OF CARE
Goal Outcome Evaluation:  Plan of Care Reviewed With: patient           Outcome Evaluation: Pt presents with facial droop, work up for brain hemorrhage.  Pt lives at home with spouse.  Pt seen by OT in ICU as pt is alert when enter room and RN present.  Pt does make eye contact, cueing to scan to R side.  R UE no AROM, tone present which reduces with gentle PROM.   L UE minimal movement but does intiate with hand over hand assist to partialy wash face.  No verbalization or and little following commands.  Will cont to follow for UE function, activity tolerance and mobilty as able.

## 2022-12-12 NOTE — PROGRESS NOTES
Discharge Planning Assessment  [facility]     Patient Name: Karolina Hoyt  MRN: [id number]  Today's Date: 12/12/2022    Admit Date: 12/8/2022    Plan: Undetermined   Discharge Needs Assessment     Row Name 12/12/22 1204       Living Environment    People in Home spouse    Current Living Arrangements home    Potentially Unsafe Housing Conditions unable to assess    Primary Care Provided by self    Provides Primary Care For no one    Family Caregiver if Needed spouse;child(miranda), adult    Quality of Family Relationships supportive;involved;helpful    Able to Return to Prior Arrangements yes       Resource/Environmental Concerns    Resource/Environmental Concerns none    Transportation Concerns none       Transition Planning    Patient/Family Anticipates Transition to home with family    Patient/Family Anticipated Services at Transition home health care;rehabilitation services    Transportation Anticipated family or friend will provide       Discharge Needs Assessment    Equipment Currently Used at Home none    Concerns to be Addressed discharge planning    Anticipated Changes Related to Illness other (see comments)    Equipment Needed After Discharge other (see comments)               Discharge Plan     Row Name 12/12/22 120       Plan    Plan Undetermined    Plan Comments IMM noted.  CCP spoke to patient’s   Michel, [phone] at bedside.  CCP role explained.  Face sheet verified.  Discharge planning discussed.  Pt emergency contact is her .  Pt obtains her medications from [facility]’s pharmacy on [address].  Pt PCP is Dr. Josie Youssef.  Pt lives in a one story home with her .  She uses no DME to ambulate.    She is independent with ADL’s.   She has no rehab history.  She has no Home Health history.   Plan is Undetermined.  CCP following for therapy evaluations              Continued Care and Services - Admitted Since 12/8/2022    Coordination has not been started for this encounter.           Demographic Summary    No documentation.                Functional Status    No documentation.                Psychosocial    No documentation.                Abuse/Neglect    No documentation.                Legal    No documentation.                Substance Abuse    No documentation.                Patient Forms    No documentation.                   Melly Harrison RN

## 2022-12-12 NOTE — THERAPY EVALUATION
Patient Name: Karolina Hoyt  : 1950    MRN: [id number]                              Today's Date: 2022       Admit Date: 2022    Visit Dx:     ICD-10-CM ICD-9-CM   1. Intraparenchymal hemorrhage of brain (HCC)  I61.9 431     Patient Active Problem List   Diagnosis   • Diverticulosis of large intestine   • Essential hypertension   • Anxiety   • C. 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 ENDOSCOPY;  Service:    • HYSTERECTOMY     • THYROID LOBECTOMY      approx  or    • THYROIDECTOMY, PARTIAL  2002    Sub-Total Thyroidectomy      General Information     Row Name 22 1158          OT Time and Intention    Document Type evaluation  -LE     Mode of Treatment individual therapy;occupational therapy  -LE     Row Name 22 1158          General Information    Patient Profile Reviewed yes  -LE     Prior Level of Function --  pt unable to provide info  -LE     Existing Precautions/Restrictions fall;NPO  -LE     Barriers to Rehab medically complex  -LE     Row Name 22 1158          Living Environment    People in Home spouse  -LE     Row Name 22 1158          Cognition    Orientation Status (Cognition) oriented to  make eye contact when called by name. no verbalization.  little following commands.  -LE     Row Name 22 1158          Safety Issues, Functional Mobility    Impairments Affecting Function (Mobility) visual/perceptual;range of motion (ROM);muscle tone abnormal;endurance/activity tolerance;coordination;grasp  -LE            User Key  (r) = Recorded By, (t) = Taken By, (c) = Cosigned By    Initials Name Provider Type    Renu Mendez OTR Occupational Therapist                 Mobility/ADL's     Row Name 12/12/22 1206          Bed Mobility    Comment, (Bed Mobility) NT  -[facility] Name 12/12/22 1206          Functional Mobility    Functional Mobility- Comment NT  -[facility] Name 12/12/22 1206          Activities of Daily Living    BADL Assessment/Intervention grooming;feeding;toileting  -[facility] Name 12/12/22 1206          Grooming Assessment/Training    [place] Level (Grooming) maximum assist (25% patient effort);wash face, hands;set up;verbal cues;nonverbal cues (demo/gesture)  -     Position (Grooming) sitting up in bed  -SUKHDEV     Comment, (Grooming) hand over hand with L hand to bring cloth to face.  attempted cloth in R hand but pt does not intiate grasp or bringing hand to face.  -[facility] Name 12/12/22 1206          Self-Feeding Assessment/Training    [place] Level (Feeding) dependent (less than 25% patient effort)  -SUKHDEV     Comment, (Feeding) sergio MOJICAk  -[facility] Name 12/12/22 1206          Toileting Assessment/Training    [place] Level (Toileting) dependent (less than 25% patient effort)  -           User Key  (r) = Recorded By, (t) = Taken By, (c) = Cosigned By    Initials Name Provider Type    Renu Mendez OTR Occupational Therapist               Obj/Interventions     Row Name 12/12/22 1240          Sensory Assessment (Somatosensory)    Sensory Assessment (Somatosensory) unable/difficult to assess  -[facility] Name 12/12/22 1240          Vision Assessment/Intervention    Vision Assessment Comment VC to gaze past midline to R side. Pt does open eyes and make eye contact when called by name. drifts to sleepy at times during session  -     Row Name 12/12/22 1240          Range of Motion Comprehensive    Comment, General Range of Motion no ROM R UE, stiffness noted, reduced with PROM.   Does not hold R UE when raised but is not flaccid.  L UE generally about 1/4-1/3 AROM.  does grasp cloth in L hand.  -LE     Row Name 12/12/22 1240          Strength Comprehensive (MMT)    General Manual Muscle Testing (MMT) Assessment upper extremity strength deficits identified  -LE           User Key  (r) = Recorded By, (t) = Taken By, (c) = Cosigned By    Initials Name Provider Type    Renu Mendez OTR Occupational Therapist               Goals/Plan     Row Name 12/12/22 1243          Bed Mobility Goal 1 (OT)    Activity/Assistive Device (Bed Mobility Goal 1, OT) sit to supine;supine to sit  -LE     [place] Level/Cues Needed (Bed Mobility Goal 1, OT) maximum assist (25-49% patient effort)  of 2 as needed  -LE     Time Frame (Bed Mobility Goal 1, OT) 2 weeks  -LE     Progress/Outcomes (Bed Mobility Goal 1, OT) goal ongoing  -LE     Row Name 12/12/22 1243          Grooming Goal 1 (OT)    Activity/Device (Grooming Goal 1, OT) oral care;wash face, hands  -LE     [place] (Grooming Goal 1, OT) moderate assist (50-74% patient effort);verbal cues required;nonverbal cues (demo/gesture) required  -LE     Time Frame (Grooming Goal 1, OT) 2 weeks  -LE     Progress/Outcome (Grooming Goal 1, OT) goal ongoing  -LE     Row Name 12/12/22 1243          ROM Goal 1 (OT)    ROM Goal 1 (OT) B UE A/AROM 1 set of 10 with support to encourage reach and hold during ADL tasks.  -LE     Time Frame (ROM Goal 1, OT) 2 weeks  -LE     Progress/Outcome (ROM Goal 1, OT) goal ongoing  -LE     Row Name 12/12/22 1243          Problem Specific Goal 1 (OT)    Problem Specific Goal 1 (OT) Mod A for sitting balance EOB to prep for xfers.  -LE     Time Frame (Problem Specific Goal 1, OT) 2 weeks  -LE     Progress/Outcome (Problem Specific Goal 1, OT) goal ongoing  -LE     Row Name 12/12/22 1243          Therapy Assessment/Plan (OT)    Planned Therapy Interventions (OT) activity tolerance training;adaptive equipment training;BADL  retraining;cognitive/visual perception retraining;functional balance retraining;neuromuscular control/coordination retraining;occupation/activity based interventions;passive ROM/stretching;patient/caregiver education/training;ROM/therapeutic exercise;strengthening exercise;transfer/mobility retraining  -           User Key  (r) = Recorded By, (t) = Taken By, (c) = Cosigned By    Initials Name Provider Type    Renu Mendez OTR Occupational Therapist               Clinical Impression     Row Name 12/12/22 1158          Pain Assessment    Pre/Posttreatment Pain Comment mild grimace noted at end of session.  -LE     Pain Intervention(s) Emotional support  -     Row Name 12/12/22 1157          Plan of Care Review    Plan of Care Reviewed With patient  -LE     Outcome Evaluation Pt presents with facial droop, work up for brain hemorrhage.  Pt lives at home with spouse.  Pt seen by OT in ICU as pt is alert when enter room and RN present.  Pt does make eye contact, cueing to scan to R side.  R UE no AROM, tone present which reduces with gentle PROM.   L UE minimal movement but does intiate with hand over hand assist to partialy wash face.  No verbalization or and little following commands.  Will cont to follow for UE function, activity tolerance and mobilty as able.  -     Row Name 12/12/22 1150          Therapy Assessment/Plan (OT)    Rehab Potential (OT) good, to achieve stated therapy goals  -     Criteria for Skilled Therapeutic Interventions Met (OT) meets criteria;yes  -LE     Therapy Frequency (OT) 3 times/wk  -     Row Name 12/12/22 1159          Therapy Plan Review/Discharge Plan (OT)    Equipment Needs Upon Discharge (OT) --  unknown.  -     Row Name 12/12/22 1155          Vital Signs    O2 Delivery Pre Treatment room air  -LE     Pre Patient Position Supine  -LE     Intra Patient Position Supine  -LE     Post Patient Position Supine  -LE     Row Name 12/12/22 1156          Positioning and  Restraints    Pre-Treatment Position in bed  -LE     Post Treatment Position bed  -LE     In Bed notified nsg;fowlers;call light within reach  did not change position in bed.  -LE           User Key  (r) = Recorded By, (t) = Taken By, (c) = Cosigned By    Initials Name Provider Type    Renu Mendez OTLINDA Occupational Therapist               Outcome Measures     Row Name 12/12/22 1246          How much help from another person do you currently need...    Turning from your back to your side while in flat bed without using bedrails? 1  -LE     Moving from lying on back to sitting on the side of a flat bed without bedrails? 1  -LE     Moving to and from a bed to a chair (including a wheelchair)? 1  -LE     Standing up from a chair using your arms (e.g., wheelchair, bedside chair)? 1  -LE     Climbing 3-5 steps with a railing? 1  -LE     To walk in hospital room? 1  -LE     AM-PAC 6 Clicks Score (PT) 6  -LE     Highest level of mobility 2 --> Bed activities/dependent transfer  -LE     Row Name 12/12/22 1246          Modified [place] Scale    Modified [place] Scale 5 - Severe disability.  Bedridden, incontinent, and requiring constant nursing care and attention.  -LE     Row Name 12/12/22 1246          Functional Assessment    Outcome Measure Options Modified [place]  -LE           User Key  (r) = Recorded By, (t) = Taken By, (c) = Cosigned By    Initials Name Provider Type    Renu Mendez, OTR Occupational Therapist                Occupational Therapy Education     Title: PT OT SLP Therapies (In Progress)     Topic: Occupational Therapy (In Progress)     Point: ADL training (In Progress)     Description:   Instruct learner(s) on proper safety adaptation and remediation techniques during self care or transfers.   Instruct in proper use of assistive devices.              Learning Progress Summary           Patient Nonacceptance, E, NL by SUKHDEV at 12/12/2022 1246                   Point: Precautions (In Progress)      Description:   Instruct learner(s) on prescribed precautions during self-care and functional transfers.              Learning Progress Summary           Patient Nonacceptance, E, NL by  at 12/12/2022 1246                   Point: Body mechanics (In Progress)     Description:   Instruct learner(s) on proper positioning and spine alignment during self-care, functional mobility activities and/or exercises.              Learning Progress Summary           Patient Nonacceptance, E, NL by  at 12/12/2022 1246                               User Key     Initials Effective Dates Name Provider Type Discipline     06/16/21 -  Renu Aviles OTR Occupational Therapist OT              OT Recommendation and Plan  Planned Therapy Interventions (OT): activity tolerance training, adaptive equipment training, BADL retraining, cognitive/visual perception retraining, functional balance retraining, neuromuscular control/coordination retraining, occupation/activity based interventions, passive ROM/stretching, patient/caregiver education/training, ROM/therapeutic exercise, strengthening exercise, transfer/mobility retraining  Therapy Frequency (OT): 3 times/wk  Plan of Care Review  Plan of Care Reviewed With: patient  Outcome Evaluation: Pt presents with facial droop, work up for brain hemorrhage.  Pt lives at home with spouse.  Pt seen by OT in ICU as pt is alert when enter room and RN present.  Pt does make eye contact, cueing to scan to R side.  R UE no AROM, tone present which reduces with gentle PROM.   L UE minimal movement but does intiate with hand over hand assist to partialy wash face.  No verbalization or and little following commands.  Will cont to follow for UE function, activity tolerance and mobilty as able.     Time Calculation:    Time Calculation- OT     Row Name 12/12/22 1247             Time Calculation- OT    OT Start Time 1116  -LE      OT Stop Time 1121  -LE      OT Time Calculation (min) 5 min  -LE      OT  Received On 12/12/22  -LE      OT - Next Appointment 12/14/22  -LE      OT Goal Re-Cert Due Date 12/26/22  -LE         Untimed Charges    OT Eval/Re-eval Minutes 5  -LE         Total Minutes    Untimed Charges Total Minutes 5  -LE       Total Minutes 5  -LE            User Key  (r) = Recorded By, (t) = Taken By, (c) = Cosigned By    Initials Name Provider Type    Renu Mendez OTR Occupational Therapist              Therapy Charges for Today     Code Description Service Date Service Provider Modifiers Qty    [id number] HC OT EVAL MOD COMPLEXITY 2 12/12/2022 Renu Aviles OTR GO 1               MAUDE Gamble  12/12/2022

## 2022-12-12 NOTE — SIGNIFICANT NOTE
12/12/22 1155   OTHER   Discipline physical therapist   Rehab Time/Intention   Session Not Performed other (see comments)  (Pt not following commands well per RN. Room full of family. Will hold today and follow up tomorrow)   Recommendation   PT - Next Appointment 12/13/22

## 2022-12-12 NOTE — PLAN OF CARE
Goal Outcome Evaluation:               Discussed with RN. SLP to continue to follow for eval readiness.

## 2022-12-12 NOTE — PROGRESS NOTES
Nutrition Services    Patient Name:  Karolina Hoyt  YOB: 1950  MRN: [id number]  Admit Date:  12/8/2022    Assessment Date:  12/12/22    Comment: TF follow up.  Apparently tube wouldn't flush over the weekend and RN pulled it back to 50cm.  KUB's reviewed.  Advanced tube a little further to to 70cm - should be in the antrum at the pylorus now, flushed well. Labs reviewed. MRI today. Still not ready for swallow eval. TF's at goal and tolerating. + BM 12/12 (formed).    Will follow clinical course, nutrition support needs.    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     -never treated-   • Tachycardia        Past Surgical History:   Procedure Laterality Date   • COLONOSCOPY N/A 2/5/2018    Procedure: COLONOSCOPY TO CECUM;  Surgeon: Emelyn Bradshaw MD;  Location: [facility] ENDOSCOPY;  Service:    • HYSTERECTOMY     • THYROID LOBECTOMY      approx 1999 or 2000   • THYROIDECTOMY, PARTIAL  2002    Sub-Total Thyroidectomy        Encounter Information        Nutrition History:     Food Preferences:    Supplements:    Factors Affecting Intake: altered mental status, swallow impairment     Anthropometrics        Current Height  Current Weight  BMI kg/m2 Height: 167 cm (65.75\")  Weight: 71.3 kg (157 lb 3 oz) (12/12/22 0523)  Body mass index is 25.57 kg/m².   Adjusted BMI (if applicable)        Admission Weight 145lb       Ideal Body Weight (IBW) 130lb   Adjusted IBW (if applicable)        Usual Body Weight (UBW) 135-140lb   Weight Change/Trend Gain       Weight History Wt Readings from Last 30 Encounters:   12/12/22 0523 71.3 kg (157 lb 3 oz)   12/11/22 0500 71.4 kg (157 lb 6.5  oz)   12/10/22 0800 72.1 kg (158 lb 15.2 oz)   12/09/22 1251 66 kg (145 lb 8.1 oz)   12/09/22 0528 65.8 kg (145 lb)   12/12/22 1004 71.2 kg (157 lb)   11/08/22 1225 65.3 kg (144 lb)   11/07/22 1430 65.3 kg (144 lb)   10/25/22 0801 63 kg (138 lb 14.2 oz)   09/21/22 0955 66.2 kg (146 lb)   09/14/22 1430 63 kg (139 lb)   09/13/22 1008 63 kg (139 lb)   08/29/22 1334 66.2 kg (146 lb)   08/14/22 2348 63.5 kg (140 lb)   08/02/22 1258 63 kg (139 lb)   05/17/22 1153 62.1 kg (137 lb)   01/13/22 1513 64 kg (141 lb)   12/02/21 1008 64 kg (141 lb)   11/17/21 1055 60.8 kg (134 lb)   05/17/21 1334 64.4 kg (142 lb)   11/16/20 1438 62.1 kg (137 lb)   09/25/19 1432 56.7 kg (125 lb)   07/10/19 1626 59 kg (130 lb)   06/19/19 1442 58.2 kg (128 lb 3.2 oz)   04/23/19 1406 59.9 kg (132 lb)   02/05/18 0811 63.6 kg (140 lb 3 oz)           --  Tests/Procedures        Tests/Procedures CT scan, MRI SLP is following     Labs       Pertinent Labs    Results from last 7 days   Lab Units 12/12/22  0607 12/11/22  0501 12/10/22  0343   SODIUM mmol/L 137 137 140   POTASSIUM mmol/L 3.7 4.2 3.9   CHLORIDE mmol/L 103 106 107   CO2 mmol/L 28.2 23.7 23.1   BUN mg/dL 9 9 12   CREATININE mg/dL 0.59 0.61 0.59   CALCIUM mg/dL 8.8 8.3* 8.9   BILIRUBIN mg/dL 0.6 1.1 1.5*   ALK PHOS U/L 66 68 74   ALT (SGPT) U/L 9 10 10   AST (SGOT) U/L 10 17 12   GLUCOSE mg/dL 160* 100* 89     Results from last 7 days   Lab Units 12/12/22  0607 12/12/22  0411 12/11/22  0501 12/10/22  0343   MAGNESIUM mg/dL  --   --   --  1.8   PHOSPHORUS mg/dL  --   --   --  2.5   HEMOGLOBIN g/dL  --  11.5*   < > 11.4*   HEMATOCRIT %  --  35.0   < > 34.3   WBC 10*3/mm3  --  10.40   < > 7.77   TRIGLYCERIDES mg/dL  --   --   --  54   ALBUMIN g/dL 3.40*  --    < > 3.40*    < > = values in this interval not displayed.     Results from last 7 days   Lab Units 12/12/22  0411 12/11/22  0501 12/10/22  0343 12/09/22  1200 12/08/22  2037   INR   --   --   --   --  0.95   PLATELETS 10*3/mm3 197 238 262 257  319     COVID19   Date Value Ref Range Status   09/13/2022 Not Detected Not Detected - Ref. Range Final     Lab Results   Component Value Date    HGBA1C 5.60 12/10/2022          Medications           Scheduled Medications busPIRone, 10 mg, Nasogastric, BID  desvenlafaxine, 50 mg, Oral, QAM  docusate sodium, 100 mg, Nasogastric, Daily  iopamidol, 100 mL, Intravenous, Once in imaging  levETIRAcetam, 500 mg, Intravenous, Q12H  losartan, 100 mg, Nasogastric, Daily  piperacillin-tazobactam, 3.375 g, Intravenous, Q8H  senna-docusate sodium, 2 tablet, Nasogastric, BID  sodium chloride, 10 mL, Intravenous, Q12H  sodium chloride, 10 mL, Intravenous, Q12H       Infusions niCARdipine, 5-15 mg/hr  sodium chloride, 75 mL/hr, Last Rate: 75 mL/hr (12/12/22 0523)       PRN Medications •  acetaminophen **OR** acetaminophen  •  aluminum-magnesium hydroxide-simethicone  •  senna-docusate sodium **AND** polyethylene glycol **AND** bisacodyl **AND** bisacodyl  •  Calcium Gluconate-NaCl **AND** calcium gluconate IVPB **AND** Calcium  •  fentaNYL citrate (PF)  •  HYDROcodone-acetaminophen  •  labetalol  •  magnesium sulfate **OR** magnesium sulfate **OR** magnesium sulfate  •  ondansetron  •  potassium chloride **OR** potassium chloride **OR** potassium chloride  •  potassium phosphate infusion greater than 15 mMoles **OR** potassium phosphate infusion greater than 15 mMoles **OR** potassium phosphate **OR** sodium phosphate IVPB **OR** sodium phosphate IVPB  •  [COMPLETED] Insert Peripheral IV **AND** sodium chloride  •  sodium chloride  •  sodium chloride  •  sodium chloride  •  sodium chloride     Physical Findings          Physical Appearance confused, Other: groggy   Oral/Mouth Cavity WNL   Edema  no edema   Gastrointestinal normoactive, last bowel movement: 12/12 formed   Skin  other:lesion on arm, scab   Tubes/Drains Cortrak, NG tube, bridle in place   NFPE Not applicable at this time     Estimated/Assessed Needs       Energy  Requirements    Height for Calculation  Height: 167 cm (65.75\")   Weight for Calculation 66kg   Method for Estimation  25 kcal/kg   EST Needs (kcal/day) 1650       Protein Requirements    Weight for Calculation 66kg   EST Protein Needs (g/kg) 1.0 - 1.2 gm/kg   EST Daily Needs (g/day) 66-79       Fluid Requirements     Method for Estimation 1 mL/kcal    Estimated Needs (mL/day) 1600       Fluid Deficit    Current Na Level (mEq/L)    Desired Na Level (mEq/L)    Estimated Fluid Deficit (L)           Current Nutrition Orders & Evaluation of Intake       Oral Nutrition     Food Allergies NKFA   Current PO Diet NPO Diet NPO Type: Strict NPO   Supplement n/a   PO Evaluation     % PO Intake     # of Days Evaluated       Enteral Nutrition     Enteral Route NG    TF Delivery Method Continuous    Enteral Product Fibersource HN    Modular     Propofol Rate/Kcal     TF Current Rate (mL) 60    TF Goal Rate (mL) 60    Current Water Flush (mL) 30mL q 4 hr    Current TF Provision  1728kcal, 78gm protein, 1166mL free water + 180mL water flushes         Calories 105 % needs met         Protein  100 % needs met         TF Fluid (mL) 1346mL         IV Fluids     Avg Volume Delivered (mL) 1145 x 1 day    % Goal Volume 79%    TF Residual  no or minimal residual    TF Changes rate increased    TF Tolerance tolerating     PES STATEMENT / NUTRITION DIAGNOSIS      Nutrition Dx Problem  Problem: Needs Alternative Route  Etiology: Medical Diagnosis   Intraparenchymal hemorrhage of brain  Signs/Symptoms: Report/Observation too lethargic for po    Comment:    --  NUTRITION INTERVENTION / PLAN OF CARE      Intervention Goal(s) Maintain nutrition status, Nutrition support treatment, Meet estimated needs, Disease management/therapy, Initiate feeding/diet, Tolerate PO , Initiate TF/PN, Tolerate TF/PN at goal, Transition TF to PO and Maintain weight         RD Intervention/Action Await begin PO diet, Follow Tx Progress, Care plan reviewed and  Recommend/ordered:          Prescription/Orders:       PO Diet       Supplements       Snacks       Enteral Nutrition       Parenteral Nutrition    New Prescription Ordered? Yes      Enteral Prescription:     Enteral Route ND    TF Delivery Method Continuous    Enteral Product Fibersource HN    Modular     Propofol Rate/Kcal     TF Start Rate (mL/hr) 20    TF Goal Rate (mL/hr) 60    Free Water Flush (mL) 30mL q 4 hr    TF Provision at Goal: 1728kcal, 78gm protein, 1166mL free water + 180mL water flushes         Calories 105 % needs met         Protein  100 % needs met         Fluid (mL) 1346 mL total     Prescription Ordered Yes         Monitor/Evaluation Per protocol, I&O, Pertinent labs, EN delivery/tolerance, Weight, Skin status, GI status, Symptoms, POC/GOC, Swallow function, Hemodynamic stability   Discharge Plan/Needs Pending clinical course   Education Will instruct as appropriate   --    RD to follow per protocol.      Electronically signed by:  Karmen Patricio RD  12/12/22 11:18 EST

## 2022-12-13 NOTE — PROGRESS NOTES
Continued Stay Note  [facility]     Patient Name: Karolina Hoyt  MRN: [id number]  Today's Date: 12/13/2022    Admit Date: 12/8/2022    Plan: Referral to [facility]   Discharge Plan     Row Name 12/13/22 1146       Plan    Plan Referral to [facility]    Plan Comments Referral to [facility]  Spoke to Nayla pt daughter               Discharge Codes    No documentation.                     Melly Harrison RN

## 2022-12-13 NOTE — PROGRESS NOTES
[facility]  Stroke screening now full referral. Chart work done. Noted bed mobility max- dependent x2 and not appropriate yet for transfers.   Will follow progress with therapy and ability to participate in and tolerate 3 hours of therapy a day.     Ramona Aldrich RN  Acute Rehab Admisison Nurse

## 2022-12-13 NOTE — PLAN OF CARE
Goal Outcome Evaluation:              Outcome Evaluation: Pt is  71 yo female presenting s/p ICH.  Prior admission pt was independent w mobility.  Pt lives in single story home w spouse and 1 step to enter.  At time of PT eval pt presents w R neglect and global aphasia limiting pts ablility to follow commands.  Pt required Max A x2 w bed mobility this date.  Pt was able to tolerate sitting EOB this date w PT.  Pt was able to sit EOB w SBA for approx 15 sec then required Min A to maintain sitting.  PT also worked w pt on tracking and turning head past midline to the R.  Pt was able to follow daughters voice and picture of great-grandson.  Pt will benifit from skilled PT to address mobility deficits and increase safety and independence w functional mobility.

## 2022-12-13 NOTE — THERAPY EVALUATION
Patient Name: Karolina Hoyt  : 1950    MRN: [id number]                              Today's Date: 2022       Admit Date: 2022    Visit Dx:     ICD-10-CM ICD-9-CM   1. Intraparenchymal hemorrhage of brain (HCC)  I61.9 431     Patient Active Problem List   Diagnosis   • Diverticulosis of large intestine   • Essential hypertension   • Anxiety   • C. 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-   • Tachycardia      Past Surgical History:   Procedure Laterality Date   • COLONOSCOPY N/A 2018    Procedure: COLONOSCOPY TO CECUM;  Surgeon: Emelyn Bradshaw MD;  Location: [facility] ENDOSCOPY;  Service:    • HYSTERECTOMY     • THYROID LOBECTOMY      approx  or    • THYROIDECTOMY, PARTIAL  2002    Sub-Total Thyroidectomy      General Information     Row Name 22 1158          Physical Therapy Time and Intention    Document Type evaluation  -MD     Mode of Treatment physical therapy  -MD     Row Name 22 1158          General Information    Patient Profile Reviewed yes  -MD     Prior Level of Function independent:  -MD     Existing Precautions/Restrictions fall  -MD     Barriers to Rehab medically complex  -MD     Row Name 22 1158          Living Environment    People in Home spouse  -MD     Row Name 22 1158          Home Main Entrance    Number of Stairs, Main Entrance one  -MD     Stair Railings, Main Entrance none  -MD     Row Name 22 1158          Stairs Within Home, Primary    Number of Stairs, Within Home, Primary none  -MD     Row Name 22 1158          Cognition    Orientation Status (Cognition) unable/difficult to assess  -MD           User Key  (r) = Recorded By, (t) = Taken By, (c) = Cosigned By     Initials Name Provider Type    Jeri Guzmán, PT Physical Therapist               Mobility     Row Name 12/13/22 1158          Bed Mobility    Bed Mobility supine-sit-supine  -MD     Supine-Sit-Supine [place] (Bed Mobility) verbal cues;nonverbal cues (demo/gesture);maximum assist (25% patient effort);dependent (less than 25% patient effort);2 person assist  -MD     Row Name 12/13/22 1158          Transfers    Comment, (Transfers) not appropriate at this time  -MD           User Key  (r) = Recorded By, (t) = Taken By, (c) = Cosigned By    Initials Name Provider Type    Jeri Guzmán, PT Physical Therapist               Obj/Interventions     Row Name 12/13/22 1159          Range of Motion Comprehensive    General Range of Motion lower extremity range of motion deficits identified  -MD     Comment, General Range of Motion difficulty following commands to test ROM this date.  B LE PROM WFL  -MD     Row Name 12/13/22 1159          Strength Comprehensive (MMT)    Comment, General Manual Muscle Testing (MMT) Assessment difficulty following commands to participate in MMT this date.  unable to formally test B LE strength at this time  -MD     Row Name 12/13/22 1159          Balance    Balance Interventions sitting;static  -MD     Comment, Balance PT worked w pt on static sitting EOB w Min-SBA needed to maintain sitting.  PT worked w pt on attention to the R with head turns and tracking gaze.  Pt able to track past midline when cued by PT and daughter.  Pt able to follow daughters picture of great grandson  -MD           User Key  (r) = Recorded By, (t) = Taken By, (c) = Cosigned By    Initials Name Provider Type    Jeri Guzmán, PT Physical Therapist               Goals/Plan     Row Name 12/13/22 1326          Bed Mobility Goal 1 (PT)    Activity/Assistive Device (Bed Mobility Goal 1, PT) sit to supine/supine to sit  -MD     [place] Level/Cues Needed (Bed Mobility Goal 1, PT) verbal cues required;moderate  assist (50-74% patient effort)  -MD     Time Frame (Bed Mobility Goal 1, PT) 2 weeks  -MD     Row Name 12/13/22 1326          Transfer Goal 1 (PT)    Activity/Assistive Device (Transfer Goal 1, PT) sit-to-stand/stand-to-sit;bed-to-chair/chair-to-bed  -MD     [place] Level/Cues Needed (Transfer Goal 1, PT) moderate assist (50-74% patient effort)  -MD     Time Frame (Transfer Goal 1, PT) 2 weeks  -MD           User Key  (r) = Recorded By, (t) = Taken By, (c) = Cosigned By    Initials Name Provider Type    Jeri Guzmán, PT Physical Therapist               Clinical Impression     Row Name 12/13/22 1202          Pain    Pretreatment Pain Rating 0/10 - no pain  -MD     Row Name 12/13/22 1202          Plan of Care Review    Outcome Evaluation Pt is  73 yo female presenting s/p ICH.  Prior admission pt was independent w mobility.  Pt lives in single story home w spouse and 1 step to enter.  At time of PT eval pt presents w R neglect and global aphasia limiting pts ablility to follow commands.  Pt required Max A x2 w bed mobility this date.  Pt was able to tolerate sitting EOB this date w PT.  Pt was able to sit EOB w SBA for approx 15 sec then required Min A to maintain sitting.  PT also worked w pt on tracking and turning head past midline to the R.  Pt was able to follow daughters voice and picture of great-grandson.  Pt will benifit from skilled PT to address mobility deficits and increase safety and independence w functional mobility.  -MD     Row Name 12/13/22 1206          Therapy Assessment/Plan (PT)    Rehab Potential (PT) good, to achieve stated therapy goals  -MD     Criteria for Skilled Interventions Met (PT) yes;meets criteria  -MD     Therapy Frequency (PT) daily  -MD     Row Name 12/13/22 1202          Positioning and Restraints    Pre-Treatment Position in bed  -MD     Post Treatment Position bed  -MD     In Bed supine;call light within reach;exit alarm on  -MD           User Key  (r) = Recorded By, (t)  = Taken By, (c) = Cosigned By    Initials Name Provider Type    Jeri Guzmán, PT Physical Therapist               Outcome Measures     Row Name 12/13/22 1327          How much help from another person do you currently need...    Turning from your back to your side while in flat bed without using bedrails? 2  -MD     Moving from lying on back to sitting on the side of a flat bed without bedrails? 2  -MD     Moving to and from a bed to a chair (including a wheelchair)? 1  -MD     Standing up from a chair using your arms (e.g., wheelchair, bedside chair)? 1  -MD     Climbing 3-5 steps with a railing? 1  -MD     To walk in hospital room? 1  -MD     AM-PAC 6 Clicks Score (PT) 8  -MD     Highest level of mobility 3 --> Sat at edge of bed  -MD     Row Name 12/13/22 1327          Modified [place] Scale    Modified Mary Scale 5 - Severe disability.  Bedridden, incontinent, and requiring constant nursing care and attention.  -MD     Row Name 12/13/22 1327          Functional Assessment    Outcome Measure Options AM-PAC 6 Clicks Basic Mobility (PT)  -MD           User Key  (r) = Recorded By, (t) = Taken By, (c) = Cosigned By    Initials Name Provider Type    Jeri Guzmán, PT Physical Therapist                             Physical Therapy Education     Title: PT OT SLP Therapies (In Progress)     Topic: Physical Therapy (In Progress)     Point: Mobility training (Not Started)     Learner Progress:  Not documented in this visit.          Point: Home exercise program (Not Started)     Learner Progress:  Not documented in this visit.          Point: Body mechanics (Not Started)     Learner Progress:  Not documented in this visit.          Point: Precautions (In Progress)     Learning Progress Summary           Patient Acceptance, E, NR by MD at 12/13/2022 1327                               User Key     Initials Effective Dates Name Provider Type Trish CARIAS 06/16/21 -  Jeri Parker, PT Physical Therapist PT              PT  Recommendation and Plan     Outcome Evaluation: Pt is  73 yo female presenting s/p ICH.  Prior admission pt was independent w mobility.  Pt lives in single story home w spouse and 1 step to enter.  At time of PT eval pt presents w R neglect and global aphasia limiting pts ablility to follow commands.  Pt required Max A x2 w bed mobility this date.  Pt was able to tolerate sitting EOB this date w PT.  Pt was able to sit EOB w SBA for approx 15 sec then required Min A to maintain sitting.  PT also worked w pt on tracking and turning head past midline to the R.  Pt was able to follow daughters voice and picture of great-grandson.  Pt will benifit from skilled PT to address mobility deficits and increase safety and independence w functional mobility.     Time Calculation:    PT Charges     Row Name 12/13/22 1328             Time Calculation    Start Time 1119  -MD      Stop Time 1141  -MD      Time Calculation (min) 22 min  -MD      PT Received On 12/13/22  -MD      PT - Next Appointment 12/14/22  -MD      PT Goal Re-Cert Due Date 12/27/22  -MD            User Key  (r) = Recorded By, (t) = Taken By, (c) = Cosigned By    Initials Name Provider Type    Jeri Guzmán, PT Physical Therapist              Therapy Charges for Today     Code Description Service Date Service Provider Modifiers Qty    21197848407 HC PT EVAL MOD COMPLEXITY 2 12/13/2022 Jeri Parker, PT GP 1    75012743092 HC PT THERAPEUTIC ACT EA 15 MIN 12/13/2022 Jeri Parker, PT GP 1    60827066993 HC PT THER SUPP EA 15 MIN 12/13/2022 Jeri Parker, PT GP 1        Patient was not wearing a face mask during this therapy encounter. Therapist used appropriate personal protective equipment including mask and gloves.  Mask used was standard procedure mask. Appropriate PPE was worn during the entire therapy session. Hand hygiene was completed before and after therapy session. Patient is not in enhanced droplet precautions.       PT G-Codes  Outcome Measure Options: AM-PAC 6  Clicks Basic Mobility (PT)  AM-PAC 6 Clicks Score (PT): 8  Modified [place] Scale: 5 - Severe disability.  Bedridden, incontinent, and requiring constant nursing care and attention.  PT Discharge Summary  Anticipated Discharge Disposition (PT): inpatient rehabilitation facility    Jeri Parker, PT  12/13/2022

## 2022-12-13 NOTE — PLAN OF CARE
Patient neurological status stable overnight. No acute events. 1 BM noted, adequate urine output. MRI completed.     Problem: Fall Injury Risk  Goal: Absence of Fall and Fall-Related Injury  Outcome: Ongoing, Progressing  Intervention: Identify and Manage Contributors  Recent Flowsheet Documentation  Taken 12/13/2022 0400 by Juan Jose Ford RN  Medication Review/Management: medications reviewed  Taken 12/13/2022 0000 by Juan Jose Ford RN  Medication Review/Management: medications reviewed  Taken 12/12/2022 2000 by Juan Jose Ford RN  Medication Review/Management: medications reviewed  Intervention: Promote Injury-Free Environment  Recent Flowsheet Documentation  Taken 12/13/2022 0400 by Juan Jose Ford RN  Safety Promotion/Fall Prevention:   activity supervised   fall prevention program maintained   lighting adjusted   nonskid shoes/slippers when out of bed   safety round/check completed   room organization consistent   toileting scheduled  Taken 12/13/2022 0000 by Juan Jose Ford RN  Safety Promotion/Fall Prevention:   activity supervised   fall prevention program maintained   clutter free environment maintained   toileting scheduled   safety round/check completed   nonskid shoes/slippers when out of bed   lighting adjusted  Taken 12/12/2022 2000 by Juan Jose Ford RN  Safety Promotion/Fall Prevention:   activity supervised   fall prevention program maintained   clutter free environment maintained   toileting scheduled   safety round/check completed   nonskid shoes/slippers when out of bed   lighting adjusted   muscle strengthening facilitated   room organization consistent     Problem: Adjustment to Illness (Stroke, Hemorrhagic)  Goal: Optimal Coping  Outcome: Ongoing, Progressing     Problem: Sensorimotor Impairment (Stroke, Hemorrhagic)  Goal: Improved Sensorimotor Function  Outcome: Ongoing, Progressing  Intervention: Optimize Range of Motion, Motor Control and Function  Recent Flowsheet Documentation  Taken  12/13/2022 0400 by Juan Jose Ford RN  Positioning/Transfer Devices:   pillows   in use  Taken 12/13/2022 0200 by Juan Jose Ford RN  Positioning/Transfer Devices:   pillows   in use  Taken 12/13/2022 0000 by Juan Jose Ford RN  Positioning/Transfer Devices:   pillows   in use  Taken 12/12/2022 2200 by Juan Jose Ford RN  Positioning/Transfer Devices:   pillows   in use  Taken 12/12/2022 2000 by Juan Jose Ford RN  Positioning/Transfer Devices:   pillows   in use  Intervention: Optimize Sensory and Perceptual Ability  Recent Flowsheet Documentation  Taken 12/13/2022 0400 by Juan Jose Ford RN  Pressure Reduction Techniques:   frequent weight shift encouraged   heels elevated off bed   positioned off wounds   pressure points protected   weight shift assistance provided  Pressure Reduction Devices:   specialty bed utilized   pressure-redistributing mattress utilized   positioning supports utilized   heel offloading device utilized  Taken 12/13/2022 0000 by Juan Jose Ford RN  Pressure Reduction Techniques:   frequent weight shift encouraged   heels elevated off bed   positioned off wounds   pressure points protected   weight shift assistance provided  Pressure Reduction Devices:   specialty bed utilized   pressure-redistributing mattress utilized   positioning supports utilized   heel offloading device utilized  Taken 12/12/2022 2000 by Juan Jose Ford RN  Pressure Reduction Techniques:   frequent weight shift encouraged   heels elevated off bed   positioned off wounds   pressure points protected   weight shift assistance provided  Pressure Reduction Devices:   specialty bed utilized   pressure-redistributing mattress utilized   heel offloading device utilized   foam padding utilized   positioning supports utilized   Goal Outcome Evaluation:

## 2022-12-13 NOTE — PROGRESS NOTES
LOS: 5 days   Patient Care Team:  Josie Youssef MD as PCP - General (Internal Medicine)  Emelyn Bradshaw MD as Surgeon (General Surgery)  Leydi Guillaume MD as Consulting Physician (Psychiatry)  Yaa Ingram MD as Consulting Physician (Obstetrics and Gynecology)  Shruti Ibrahim APRN as Nurse Practitioner (Gastroenterology)    Chief Complaint:  F/up intracranial hemorrhage, uncontrolled hypertension and medical problems as below.    Subjective   Interval History  No change in mental status.  Somnolent but easily arousable.  Still profoundly aphasic.  No reports of seizure.  Weak on the right side but can move her right arm slightly.      Tolerating TF      REVIEW OF SYSTEMS:   Cannot obtain due to aphasia.    Ventilator/Non-Invasive Ventilation Settings (From admission, onward)    None                Physical Exam:     Vital Signs  Temp:  [97.9 °F (36.6 °C)-99.1 °F (37.3 °C)] 98.3 °F (36.8 °C)  Heart Rate:  [52-79] 65  BP: (115-153)/() 124/66    Intake/Output Summary (Last 24 hours) at 12/13/2022 1222  Last data filed at 12/13/2022 0800  Gross per 24 hour   Intake 3403 ml   Output 2250 ml   Net 1153 ml     Flowsheet Rows    Flowsheet Row First Filed Value   Admission Height 167.6 cm (66\") Documented at 12/09/2022 0528   Admission Weight 65.8 kg (145 lb) Documented at 12/09/2022 0528          PPE used per hospital policy    General Appearance:   Alert, intermittently cooperative, in no acute distress.  Snoring   ENMT:  Mallampati score 4, moist mucous membrane   Eyes:  Pupils equal and reactive to light. EOMI   Neck:   Trachea midline. No thyromegaly.   Lungs:    Clear to auscultation,respirations regular, even and nonlabored    Heart:   Regular rhythm and normal rate, systolic murmur over the right sternal border grade 3/6 with radiation   Skin:   No rash or ecchymosis   Abdomen:    Obese. Soft. No tenderness. No HSM.   Neuro/psych:  Conscious, alert.   Intermittently cooperative.  She squeezed with her left arm only and was very slightly able to move her right hand.   Extremities:  No cyanosis, clubbing or edema.  Warm extremities and well-perfused          Results Review:        Results from last 7 days   Lab Units 12/13/22  0403 12/12/22  0607 12/11/22  0501   SODIUM mmol/L 139 137 137   POTASSIUM mmol/L 3.8 3.7 4.2   CHLORIDE mmol/L 104 103 106   CO2 mmol/L 29.0 28.2 23.7   BUN mg/dL 9 9 9   CREATININE mg/dL 0.55* 0.59 0.61   GLUCOSE mg/dL 125* 160* 100*   CALCIUM mg/dL 9.2 8.8 8.3*     Results from last 7 days   Lab Units 12/08/22 2037   TROPONIN T ng/mL <0.010     Results from last 7 days   Lab Units 12/13/22  0403 12/12/22  0411 12/11/22  0501   WBC 10*3/mm3 7.78 10.40 9.15   HEMOGLOBIN g/dL 11.3* 11.5* 11.0*   HEMATOCRIT % 33.7* 35.0 33.8*   PLATELETS 10*3/mm3 251 197 238     Results from last 7 days   Lab Units 12/08/22 2037   INR  0.95                               I reviewed the patient's new clinical results.        Medication Review:   busPIRone, 10 mg, Nasogastric, BID  desvenlafaxine, 50 mg, Oral, QAM  docusate sodium, 100 mg, Nasogastric, Daily  iopamidol, 100 mL, Intravenous, Once in imaging  levETIRAcetam, 500 mg, Intravenous, Q12H  losartan, 100 mg, Nasogastric, Daily  piperacillin-tazobactam, 3.375 g, Intravenous, Q8H  senna-docusate sodium, 2 tablet, Nasogastric, BID  sodium chloride, 10 mL, Intravenous, Q12H  sodium chloride, 10 mL, Intravenous, Q12H        niCARdipine, 5-15 mg/hr  sodium chloride, 75 mL/hr, Last Rate: 75 mL/hr (12/13/22 0931)        Diagnostic imaging:  I personally and independently reviewed the following images:  CT chest 12/9/2022: Bilateral patchy opacities in the apices and subtle infiltrates bilaterally in the lower lobes.      CT brain 12/9/2022: Expanding left frontal hematoma    CT brain 12/11/2022: No change    CT brain 12/12/2022: No change.    MRI 12/12/2022: No change.    Assessment   1. Spontaneous left  frontal intraparenchymal hemorrhage  2. Brain edema and midline shift  3. Uncontrolled hypertension  4. Depression  5. Left arm skin lesion  6. Aspiration  7. Dysphagia  8. Mild anemia  9. Bilateral pulmonary infiltrates: In the apices (could represent scarring) and subtle infiltrates in the lower lobes bilaterally.  Could be related to aspiration.  10. History of basal cell carcinoma          Plan     · DC IV fluid  · Titrate wean off Cardene drip.  Use labetalol as needed for HTN.  · Neurochecks per protocol.  Change to every 4 hours  · Keppra  500 mg twice daily, stop date 12/14 (7 days since hemorrhage)  · Change Zosyn to total of 5 days only.  There is no evidence of fever or leukocytosis or other signs of pneumonia.  Pro-Federico normal.  However, subtle pulmonary infiltrates noted on CT  · Dermatology for the left arm lesion, can be addressed later  · Head of bed elevation.  Aspiration precaution.  · Tube feeding, titrate to goal.  Speech evaluation  · Bowel regimen  · PT OT    Discussed with her family at bedside  Requires rehab    Dispo: Transfer to floor.  Consult medicine team for evaluation and management of the above conditions.  .    Velma Jarrett MD  12/13/22  12:22 EST            This note was dictated utilizing Dragon dictation

## 2022-12-13 NOTE — PROGRESS NOTES
Neurosurgery progress note    Post bleed day #4 s/p left frontal hemorrhage    Subjective: No events reported overnight.  Continues to have difficulty speaking but is following commands better today.     Objective:  Vitals:    12/13/22 0400 12/13/22 0415 12/13/22 0430 12/13/22 0734   BP: 124/71      Pulse: 62 74 56    Resp:       Temp:    98 °F (36.7 °C)   TempSrc:    Oral   SpO2: 96% 98% 96%    Weight:       Height:             Physical exam  Global aphasia  Opens eyes spontaneously  EOMI  Face symmetric  Unable to perform motor exam due to aphasia but does not appear to have any focal motor deficits.  Moving all 4 extremities  Unable to assess gait as patient has been bedbound        Assessment/plan  72-year-old female post bleed day #4 with a large left frontal hemorrhage  -Please call with any changes in neurologic exam  -Recommend continue to maintain systolic blood pressure less than 150 mmHg.  -MRI brain most consistent with amyloid  -Continue with Keppra 500 mg twice daily for seizure prophylaxis for 1 week from the time of hemorrhage  -overall stable, symptoms are going to take some time to improve. would consider PEG tube soon if unable to pass swallow study in the next few days. we will continue to monitor but nothing further to add at this time.      Britney Anaya, APRN  12/13/22  0900

## 2022-12-14 NOTE — PROGRESS NOTES
LOS: 6 days   Patient Care Team:  Josie Youssef MD as PCP - General (Internal Medicine)  Emelyn Bradshaw MD as Surgeon (General Surgery)  Leydi Guillaume MD as Consulting Physician (Psychiatry)  Yaa Ingram MD as Consulting Physician (Obstetrics and Gynecology)  Shruti Ibrahim APRN as Nurse Practitioner (Gastroenterology)    Chief Complaint:  F/up intracranial hemorrhage, uncontrolled hypertension and medical problems as below.    Subjective   Interval History  Seems to be more awake.  She smiled a few times when I assessed her.  Nonverbal.  Still very weak in her right side specially her right arm.  She did not really move her right arm for me but was able to spontaneously move her right foot and also withdraw to pain.  She occasionally follow commands with her left arm and left leg.      Tolerating TF.      REVIEW OF SYSTEMS:   Cannot obtain due to aphasia.    Ventilator/Non-Invasive Ventilation Settings (From admission, onward)    None                Physical Exam:     Vital Signs  Temp:  [98.4 °F (36.9 °C)-99.8 °F (37.7 °C)] 98.6 °F (37 °C)  Heart Rate:  [57-84] 84  Resp:  [18-21] 18  BP: (116-155)/(66-94) 130/72    Intake/Output Summary (Last 24 hours) at 12/14/2022 1213  Last data filed at 12/14/2022 0800  Gross per 24 hour   Intake 2089 ml   Output 2150 ml   Net -61 ml     Flowsheet Rows    Flowsheet Row First Filed Value   Admission Height 167.6 cm (66\") Documented at 12/09/2022 0528   Admission Weight 65.8 kg (145 lb) Documented at 12/09/2022 0528          PPE used per hospital policy    General Appearance:   Alert, intermittently cooperative, in no acute distress.  Snoring   ENMT:  Mallampati score 4, moist mucous membrane   Eyes:  Pupils equal and reactive to light. EOMI   Neck:   Trachea midline. No thyromegaly.   Lungs:    Clear to auscultation,respirations regular, even and nonlabored    Heart:   Regular rhythm and normal rate, systolic murmur  over the right sternal border grade 3/6 with radiation   Skin:   No rash or ecchymosis   Abdomen:    Obese. Soft. No tenderness. No HSM.   Neuro/psych:  Conscious, alert.  Intermittently cooperative.  She squeezed with her left arm only.  Not moving her right arm.  Right leg withdraw to pain.   Extremities:  No cyanosis, clubbing or edema.  Warm extremities and well-perfused          Results Review:        Results from last 7 days   Lab Units 12/13/22  0403 12/12/22  0607 12/11/22  0501   SODIUM mmol/L 139 137 137   POTASSIUM mmol/L 3.8 3.7 4.2   CHLORIDE mmol/L 104 103 106   CO2 mmol/L 29.0 28.2 23.7   BUN mg/dL 9 9 9   CREATININE mg/dL 0.55* 0.59 0.61   GLUCOSE mg/dL 125* 160* 100*   CALCIUM mg/dL 9.2 8.8 8.3*     Results from last 7 days   Lab Units 12/08/22 2037   TROPONIN T ng/mL <0.010     Results from last 7 days   Lab Units 12/13/22  0403 12/12/22  0411 12/11/22  0501   WBC 10*3/mm3 7.78 10.40 9.15   HEMOGLOBIN g/dL 11.3* 11.5* 11.0*   HEMATOCRIT % 33.7* 35.0 33.8*   PLATELETS 10*3/mm3 251 197 238     Results from last 7 days   Lab Units 12/08/22 2037   INR  0.95                               I reviewed the patient's new clinical results.        Medication Review:   busPIRone, 10 mg, Nasogastric, BID  desvenlafaxine, 50 mg, Oral, QAM  docusate sodium, 100 mg, Nasogastric, Daily  iopamidol, 100 mL, Intravenous, Once in imaging  levETIRAcetam, 500 mg, Intravenous, Q12H  losartan, 100 mg, Nasogastric, Daily  senna-docusate sodium, 2 tablet, Nasogastric, BID  sodium chloride, 10 mL, Intravenous, Q12H  sodium chloride, 10 mL, Intravenous, Q12H             Diagnostic imaging:  I personally and independently reviewed the following images:  CT chest 12/9/2022: Bilateral patchy opacities in the apices and subtle infiltrates bilaterally in the lower lobes.      CT brain 12/9/2022: Expanding left frontal hematoma    CT brain 12/11/2022: No change    CT brain 12/12/2022: No change.    MRI 12/12/2022: No  change.    Assessment   1. Spontaneous left frontal intraparenchymal hemorrhage  2. Brain edema and midline shift  3. Uncontrolled hypertension  4. Depression  5. Left arm skin lesion  6. Aspiration  7. Dysphagia  8. Mild anemia  9. Bilateral pulmonary infiltrates: In the apices (could represent scarring) and subtle infiltrates in the lower lobes bilaterally.  Could be related to aspiration.  10. History of basal cell carcinoma          Plan         · Neurochecks every 6 hours  · Keppra  500 mg twice daily, stop date 12/14 (7 days since hemorrhage)  · Finished Zosyn for aspiration pneumonia  · Dermatology for the left arm lesion, can be addressed later  · Head of bed elevation.  Aspiration precaution.  · Tube feeding at goal.  Speech evaluation.  No overt aspiration noted today.  Reassess again over the next few days and if she fails then could consider PEG.  Family would like to give her more time and chance in hope of avoiding PEG tube  · Bowel regimen  · PT OT    Discussed with her family at bedside.  Discussed with ICU staff during the multidisciplinary round  Requires rehab    Dispo: Floor status  .    Velma Jarrett MD  12/14/22  12:13 EST    Time>35 min face to face and on the herrera >1/2 directed toward counseling and coordination of care        This note was dictated utilizing Dragon dictation

## 2022-12-14 NOTE — PLAN OF CARE
Goal Outcome Evaluation:  Plan of Care Reviewed With: patient, daughter           Outcome Evaluation: Clinical swallow eval completed. Patient accepted limited PO trials. Recommend NPO with continued alternate nutrition, meds alternate route. Patient initiating swallow with PO trials, inconsistently delayed. No overt s/s aspiration, patient nonverbal and unable to follow commands with SLP. SLP suspects given plan for rehab, patient will benefit from PEG as source of nutrition secondary to concern for ability to meet nutritional needs with PO diet alone when patient is appropriate for diet. SLP to follow for re-evaluation and speech/language eval as appropriate.

## 2022-12-14 NOTE — PLAN OF CARE
Goal Outcome Evaluation:  Plan of Care Reviewed With: (P) patient           Outcome Evaluation: (P) Pt agreeable to participate in therapy this date but limited in capacity to participate and is globally aphasic. Pt following 25% of commands this date. Pt performed bed mobility max-assist x2 and maintained sitting EOB for 16 minutes with mod progressing to CGA with cueing. Pt kept eyes closed for most of therapy session and would inconsistently open eyes and visually track to the R when cued. Pt tends to hold head rotated to the L and is able to rotate head to the right but will not maintain this position for longer than a few seconds. Pt performed sit to stand transfer twice with max-assist and maintained standing position with max-assist x2 for a few seconds. Pt would be a great candidate for inpatient rehab.

## 2022-12-14 NOTE — NURSING NOTE
Report called to [facility]. Speech therapy on unit to see patient. Holding until ST can reevaluate prior to move

## 2022-12-14 NOTE — PROGRESS NOTES
Nutrition Services    Patient Name:  Karolina Hoyt  YOB: 1950  MRN: [id number]  Admit Date:  12/8/2022    Assessment Date:  12/14/22    Comment: TF follow up. TF's at goal with Fibersource HN and tolerating. + BM 12/13 (soft). Remains aphasic. SLP is following. RN reports pt is a little more alert today and following some commands. Labs, meds reviewed. Hopefully she can transition to po and avoid a PEG.    Will follow clinical course, nutrition support needs.    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     -never treated-   • Tachycardia        Past Surgical History:   Procedure Laterality Date   • COLONOSCOPY N/A 2/5/2018    Procedure: COLONOSCOPY TO CECUM;  Surgeon: Emelyn Bradshaw MD;  Location: [facility] ENDOSCOPY;  Service:    • HYSTERECTOMY     • THYROID LOBECTOMY      approx 1999 or 2000   • THYROIDECTOMY, PARTIAL  2002    Sub-Total Thyroidectomy        Encounter Information        Nutrition History:     Food Preferences:    Supplements:    Factors Affecting Intake: altered mental status, swallow impairment     Anthropometrics        Current Height  Current Weight  BMI kg/m2 Height: 167 cm (65.75\")  Weight: 67.4 kg (148 lb 9.4 oz) (12/14/22 0600)  Body mass index is 24.17 kg/m².   Adjusted BMI (if applicable)        Admission Weight 145lb       Ideal Body Weight (IBW) 130lb   Adjusted IBW (if applicable)        Usual Body Weight (UBW) 135-140lb   Weight Change/Trend Gain       Weight History Wt Readings from Last 30 Encounters:   12/14/22 0600 67.4 kg (148 lb 9.4 oz)   12/12/22 0523 71.3 kg (157 lb 3 oz)   12/11/22 0500 71.4 kg (157 lb 6.5 oz)   12/10/22 0800 72.1 kg (158 lb  15.2 oz)   12/09/22 1251 66 kg (145 lb 8.1 oz)   12/09/22 0528 65.8 kg (145 lb)   12/12/22 1004 71.2 kg (157 lb)   11/08/22 1225 65.3 kg (144 lb)   11/07/22 1430 65.3 kg (144 lb)   10/25/22 0801 63 kg (138 lb 14.2 oz)   09/21/22 0955 66.2 kg (146 lb)   09/14/22 1430 63 kg (139 lb)   09/13/22 1008 63 kg (139 lb)   08/29/22 1334 66.2 kg (146 lb)   08/14/22 2348 63.5 kg (140 lb)   08/02/22 1258 63 kg (139 lb)   05/17/22 1153 62.1 kg (137 lb)   01/13/22 1513 64 kg (141 lb)   12/02/21 1008 64 kg (141 lb)   11/17/21 1055 60.8 kg (134 lb)   05/17/21 1334 64.4 kg (142 lb)   11/16/20 1438 62.1 kg (137 lb)   09/25/19 1432 56.7 kg (125 lb)   07/10/19 1626 59 kg (130 lb)   06/19/19 1442 58.2 kg (128 lb 3.2 oz)   04/23/19 1406 59.9 kg (132 lb)   02/05/18 0811 63.6 kg (140 lb 3 oz)           --  Tests/Procedures        Tests/Procedures CT scan, MRI SLP is following     Labs       Pertinent Labs    Results from last 7 days   Lab Units 12/13/22  0403 12/12/22  0607 12/11/22  0501   SODIUM mmol/L 139 137 137   POTASSIUM mmol/L 3.8 3.7 4.2   CHLORIDE mmol/L 104 103 106   CO2 mmol/L 29.0 28.2 23.7   BUN mg/dL 9 9 9   CREATININE mg/dL 0.55* 0.59 0.61   CALCIUM mg/dL 9.2 8.8 8.3*   BILIRUBIN mg/dL 0.4 0.6 1.1   ALK PHOS U/L 56 66 68   ALT (SGPT) U/L 7 9 10   AST (SGOT) U/L 14 10 17   GLUCOSE mg/dL 125* 160* 100*     Results from last 7 days   Lab Units 12/13/22  0403 12/11/22  0501 12/10/22  0343   MAGNESIUM mg/dL  --   --  1.8   PHOSPHORUS mg/dL  --   --  2.5   HEMOGLOBIN g/dL 11.3*   < > 11.4*   HEMATOCRIT % 33.7*   < > 34.3   WBC 10*3/mm3 7.78   < > 7.77   TRIGLYCERIDES mg/dL  --   --  54   ALBUMIN g/dL 3.10*   < > 3.40*    < > = values in this interval not displayed.     Results from last 7 days   Lab Units 12/13/22  0403 12/12/22  0411 12/11/22  0501 12/10/22  0343 12/09/22  1200 12/08/22 2037   INR   --   --   --   --   --  0.95   PLATELETS 10*3/mm3 251 197 238 262 257 319     COVID19   Date Value Ref Range Status   09/13/2022  Not Detected Not Detected - Ref. Range Final     Lab Results   Component Value Date    HGBA1C 5.60 12/10/2022          Medications           Scheduled Medications busPIRone, 10 mg, Nasogastric, BID  desvenlafaxine, 50 mg, Oral, QAM  docusate sodium, 100 mg, Nasogastric, Daily  iopamidol, 100 mL, Intravenous, Once in imaging  levETIRAcetam, 500 mg, Intravenous, Q12H  losartan, 100 mg, Nasogastric, Daily  piperacillin-tazobactam, 3.375 g, Intravenous, Q8H  senna-docusate sodium, 2 tablet, Nasogastric, BID  sodium chloride, 10 mL, Intravenous, Q12H  sodium chloride, 10 mL, Intravenous, Q12H       Infusions     PRN Medications •  acetaminophen **OR** acetaminophen  •  aluminum-magnesium hydroxide-simethicone  •  senna-docusate sodium **AND** polyethylene glycol **AND** bisacodyl **AND** bisacodyl  •  Calcium Gluconate-NaCl **AND** calcium gluconate IVPB **AND** Calcium  •  fentaNYL citrate (PF)  •  HYDROcodone-acetaminophen  •  labetalol  •  labetalol  •  magnesium sulfate **OR** magnesium sulfate **OR** magnesium sulfate  •  ondansetron  •  potassium chloride **OR** potassium chloride **OR** potassium chloride  •  potassium phosphate infusion greater than 15 mMoles **OR** potassium phosphate infusion greater than 15 mMoles **OR** potassium phosphate **OR** sodium phosphate IVPB **OR** sodium phosphate IVPB  •  [COMPLETED] Insert Peripheral IV **AND** sodium chloride  •  sodium chloride  •  sodium chloride  •  sodium chloride  •  sodium chloride     Physical Findings          Physical Appearance confused, Other: groggy   Oral/Mouth Cavity WNL   Edema  no edema   Gastrointestinal normoactive, last bowel movement: 12/13 soft   Skin  other:lesion on arm, scab   Tubes/Drains Cortrak, NG tube, bridle in place   NFPE Not applicable at this time     Estimated/Assessed Needs       Energy Requirements    Height for Calculation  Height: 167 cm (65.75\")   Weight for Calculation 66kg   Method for Estimation  25 kcal/kg   EST Needs  (kcal/day) 1650       Protein Requirements    Weight for Calculation 66kg   EST Protein Needs (g/kg) 1.0 - 1.2 gm/kg   EST Daily Needs (g/day) 66-79       Fluid Requirements     Method for Estimation 1 mL/kcal    Estimated Needs (mL/day) 1600       Fluid Deficit    Current Na Level (mEq/L)    Desired Na Level (mEq/L)    Estimated Fluid Deficit (L)           Current Nutrition Orders & Evaluation of Intake       Oral Nutrition     Food Allergies NKFA   Current PO Diet NPO Diet NPO Type: Strict NPO   Supplement n/a   PO Evaluation     % PO Intake     # of Days Evaluated       Enteral Nutrition     Enteral Route NG    TF Delivery Method Continuous    Enteral Product Fibersource HN    Modular     Propofol Rate/Kcal     TF Current Rate (mL) 60    TF Goal Rate (mL) 60    Current Water Flush (mL) 30mL q 4 hr    Current TF Provision  1728kcal, 78gm protein, 1166mL free water + 180mL water flushes         Calories 105 % needs met         Protein  100 % needs met         TF Fluid (mL) 1346mL         IV Fluids     Avg Volume Delivered (mL) 1296 x 2 day    % Goal Volume 90%    TF Residual  no or minimal residual    TF Changes rate increased    TF Tolerance tolerating     PES STATEMENT / NUTRITION DIAGNOSIS      Nutrition Dx Problem  Problem: Needs Alternative Route  Etiology: Medical Diagnosis   Intraparenchymal hemorrhage of brain  Signs/Symptoms: Report/Observation too lethargic for po    Comment:    --  NUTRITION INTERVENTION / PLAN OF CARE      Intervention Goal(s) Maintain nutrition status, Nutrition support treatment, Meet estimated needs, Disease management/therapy, Initiate feeding/diet, Tolerate PO , Initiate TF/PN, Tolerate TF/PN at goal, Transition TF to PO and Maintain weight         RD Intervention/Action Await begin PO diet, Follow Tx Progress, Care plan reviewed and Recommend/ordered:          Prescription/Orders:       PO Diet       Supplements       Snacks       Enteral Nutrition       Parenteral Nutrition     New Prescription Ordered? No changes at this time      Enteral Prescription:     Enteral Route NG    TF Delivery Method Continuous    Enteral Product Fibersource HN    Modular     Propofol Rate/Kcal     TF Start Rate (mL/hr) 20    TF Goal Rate (mL/hr) 60    Free Water Flush (mL) 30mL q 4 hr    TF Provision at Goal: 1728kcal, 78gm protein, 1166mL free water + 180mL water flushes         Calories 105 % needs met         Protein  100 % needs met         Fluid (mL) 1346 mL total     Prescription Ordered No changes at this time         Monitor/Evaluation Per protocol, I&O, Pertinent labs, EN delivery/tolerance, Weight, Skin status, GI status, Symptoms, POC/GOC, Swallow function, Hemodynamic stability   Discharge Plan/Needs Pending clinical course   Education Will instruct as appropriate   --    RD to follow per protocol.      Electronically signed by:  Karmen Patricio RD  12/14/22 08:48 EST

## 2022-12-14 NOTE — PROGRESS NOTES
Neurosurgery progress note    Post bleed day #4 s/p left frontal hemorrhage    Subjective: No events reported overnight.  Continues to have difficulty speaking but is following commands better today.     Objective:  Vitals:    12/14/22 0600 12/14/22 0700 12/14/22 0720 12/14/22 0800   BP: 123/94 131/92  133/81   BP Location: Left arm Left arm     Patient Position: Lying Lying     Pulse: 57 71  58   Resp:       Temp:   98.4 °F (36.9 °C)    TempSrc:   Oral    SpO2: 95% 98%  97%   Weight: 67.4 kg (148 lb 9.4 oz)      Height:             Physical exam  Global aphasia  Opens eyes spontaneously  EOMI  Face symmetric  Does not appear to have any focal motor deficits, unable to perform  Moving all 4 extremities, right side weaker than left  SCDs     Assessment/plan  72-year-old female post bleed day #5 with a large left frontal hemorrhage  -Recommend continue to maintain systolic blood pressure less than 150 mmHg.  -MRI brain most consistent with amyloid  -Continue with Keppra 500 mg twice daily for seizure prophylaxis for 1 week from the time of hemorrhage  -overall stable, symptoms are going to take some time to improve. Nothing further to add from neurosurgical standpoint, we will sign off at this time, okay to transfer to the floor.  -would consider PEG tube soon if unable to pass swallow study in the next few days.   -Please call with any changes in neurologic exam    ALDO Macdonald  12/14/22

## 2022-12-14 NOTE — PLAN OF CARE
Goal Outcome Evaluation:  Plan of Care Reviewed With: patient           Outcome Evaluation: Pt seen for OT/PT session this PM. Pt able to follow <25% of commands this date that improved with visual cues. Pt globally aphasic throughout. Required max Ax2 to perform bed mobility. Sitting balance ranged from mod-CGA with cues. Participated in weightbearing on the RUE while working on tracking to the R side. Required max cues to keep eyes open while sitting EOB at times and visually track to the R. Pt able to visually track object held up by OT with increased cuing. Unable to hold this position for long. Performed gentle PROM of RUE. Pt able to sit EOB for longer period of time this session. Max A required for simple grooming task and hand over hand with LUE. Performed STS x2 at EOB with max Ax2.    Therapist wore proper PPE and performed hand hygiene before and after session.

## 2022-12-14 NOTE — THERAPY EVALUATION
Acute Care - Speech Language Pathology   Swallow Initial Evaluation [facility]     Patient Name: Karolina Hoyt  : 1950  MRN: [id number]  Today's Date: 2022               Admit Date: 2022    Visit Dx:     ICD-10-CM ICD-9-CM   1. Intraparenchymal hemorrhage of brain (HCC)  I61.9 431     Patient Active Problem List   Diagnosis   • Diverticulosis of large intestine   • Essential hypertension   • Anxiety   • C. 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 for Method of Medication Administration: meds via alternate route (22 1000)     Monitor for Signs of Aspiration: yes (22)  Recommended Diagnostics: reassess via clinical swallow evaluation (22 1000)  Swallow Criteria for Skilled Therapeutic Interventions Met: demonstrates skilled criteria (22)  Anticipated Discharge Disposition (SLP): anticipate therapy at next level of care (22)  Rehab Potential/Prognosis, Swallowing: good, to achieve stated therapy goals (22 1000)  Therapy Frequency (Swallow): PRN (22 1000)  Predicted Duration Therapy Intervention (Days): until discharge (22)                                         Plan of Care Reviewed With: patient, daughter  Outcome Evaluation: Clinical swallow eval completed. Patient accepted limited PO trials. Recommend NPO with continued alternate nutrition, meds alternate route. Patient initiating swallow with PO trials, inconsistently delayed. No overt s/s aspiration, patient nonverbal and unable to follow commands with SLP. SLP suspects given plan for rehab, patient will benefit from PEG as source of nutrition secondary to concern for ability to meet nutritional needs with PO diet alone when patient is appropriate for diet.      SWALLOW EVALUATION (last 72 hours)     SLP Adult Swallow Evaluation     Row Name 12/14/22 1000                   Rehab Evaluation    Document Type evaluation  -OC        Subjective Information no complaints  -OC        Patient Observations alert;cooperative;agree to therapy  -OC        Patient Effort good  -OC        Symptoms Noted During/After Treatment none  -OC           General Information    Patient Profile Reviewed yes  -OC        Pertinent History Of Current Problem Patient admitted with large L frontal intraparenchymal hemorrage. Edema and midline shift. Patient with global aphasia.  -OC        Current Method of Nutrition NPO;nasogastric feedings  -OC        Precautions/Limitations, Vision WFL;for purposes of eval  -OC        Precautions/Limitations, Hearing WFL  -OC        Prior Level of Function-Communication WFL  -OC        Prior Level of Function-Swallowing no diet consistency restrictions  -OC        Plans/Goals Discussed with patient and family;agreed upon  -OC        Barriers to Rehab medically complex  -OC        Patient's Goals for Discharge patient could not state  -OC        Family Goals for Discharge patient able to return to PO diet;other (see comments)  as able  -OC           Pain Scale: Numbers Pre/Post-Treatment    Pretreatment Pain Rating 0/10 - no pain  -OC        Pre/Posttreatment Pain Comment daughter noting inconsistent facial  grimmacing prior to and after eval  -OC           Oral Motor Structure and Function    Dentition Assessment natural, present and adequate  -OC        Secretion Management WNL/WFL  -OC        Mucosal Quality moist, healthy  -OC        Volitional Swallow unable to elicit  -OC        Volitional Cough unable to elicit  -OC           Oral Musculature and Cranial Nerve Assessment    Oral Motor General Assessment unable to assess;other (see comments)  unable to follow commands  -OC        Vocal Impairment, Detail. Cranial Nerve X (Vagus) other (see comments)  nonverbal  -OC           Clinical Swallow Eval    Clinical Swallow Evaluation Summary Patient alert and upright in bed. Patient visually tracking SLP and daughter in room. Patient unable to follow oral motor commands, demonstration and tactile cues did not aid. Patient appears to be tolerating secretios. Oral cavity appears clean and moist. Patient demonstrated adequate acceptance of ice chips, prolonged manipulation and delayed swallow. No voicing s/p trials. Patient demonstrated poor acceptance of nectar thick and honey thick via spoon. Delayed swallow noted. Improved acceptance of nectar thick via cup, imrpoved timing of swallow. Patient unable to accept thin via cup, anterior loss. Multiple swallows with thin via cup. Patient remained nonverbal throughout eval. SLP suspects given plan for rehab, patient will benefit from PEG as source of nutrition secondary to concern for ability to meet nutritional needs with PO diet alone when patient is appropriate for diet. Discused with daughter. Instrumental eval as patient accepting increased PO trials.  -OC           SLP Evaluation Clinical Impression    SLP Swallowing Diagnosis oral dysphagia;suspected pharyngeal dysphagia  -OC        Functional Impact risk of aspiration/pneumonia  -OC        Rehab Potential/Prognosis, Swallowing good, to achieve stated therapy goals  -OC        Swallow Criteria for Skilled Therapeutic  Interventions Met demonstrates skilled criteria  -OC           Recommendations    Therapy Frequency (Swallow) PRN  -OC        Predicted Duration Therapy Intervention (Days) until discharge  -OC        SLP Diet Recommendation NPO  -OC        Recommended Diagnostics reassess via clinical swallow evaluation  -OC        Oral Care Recommendations Oral Care BID/PRN  -OC        SLP Rec. for Method of Medication Administration meds via alternate route  -OC        Monitor for Signs of Aspiration yes  -OC        Anticipated Discharge Disposition (SLP) anticipate therapy at next level of care  -OC           Swallow Goals (SLP)    Swallow LTGs Swallow Long Term Goal (free text)  -OC           (LTG) Swallow    (LTG) Swallow Tolerate least restrictive diet/PO trials.  -OC        [place] (Swallow Long Term Goal) with 1:1 assist/ supervision  -OC        Time Frame (Swallow Long Term Goal) by discharge  -OC              User Key  (r) = Recorded By, (t) = Taken By, (c) = Cosigned By    Initials Name Effective Dates    Chante Pizarro MA,CCC-SLP 06/16/21 -                 EDUCATION  The patient has been educated in the following areas:   Dysphagia (Swallowing Impairment).        SLP GOALS     Row Name 12/14/22 1000             (LTG) Swallow    (LTG) Swallow Tolerate least restrictive diet/PO trials.  -OC      [place] (Swallow Long Term Goal) with 1:1 assist/ supervision  -OC      Time Frame (Swallow Long Term Goal) by discharge  -OC            User Key  (r) = Recorded By, (t) = Taken By, (c) = Cosigned By    Initials Name Provider Type    Chante Pizarro MA,CCC-SLP Speech and Language Pathologist                   Time Calculation:    Time Calculation- SLP     Row Name 12/14/22 1501             Time Calculation- SLP    SLP Start Time 1015  -OC      SLP Received On 12/14/22  -OC         Untimed Charges    SLP Eval/Re-eval  ST Eval Oral Pharyng Swallow - 04999  -OC      29061-TW Eval Oral Pharyng Swallow Minutes 75  -OC          Total Minutes    Untimed Charges Total Minutes 75  -OC       Total Minutes 75  -OC            User Key  (r) = Recorded By, (t) = Taken By, (c) = Cosigned By    Initials Name Provider Type    OC Chante Isidro MA,CCC-SLP Speech and Language Pathologist                Therapy Charges for Today     Code Description Service Date Service Provider Modifiers Qty    [id number] HC ST EVAL ORAL PHARYNG SWALLOW 5 12/14/2022 Chante Isidro MA,ANGELA-SLP GN 1               Chante Isidro MA,ELÍAS  12/14/2022

## 2022-12-14 NOTE — THERAPY TREATMENT NOTE
Patient Name: Karolina Hoyt  : 1950    MRN: [id number]                              Today's Date: 2022       Admit Date: 2022    Visit Dx:     ICD-10-CM ICD-9-CM   1. Intraparenchymal hemorrhage of brain (HCC)  I61.9 431     Patient Active Problem List   Diagnosis   • Diverticulosis of large intestine   • Essential hypertension   • Anxiety   • C. 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-   • Tachycardia      Past Surgical History:   Procedure Laterality Date   • COLONOSCOPY N/A 2018    Procedure: COLONOSCOPY TO CECUM;  Surgeon: Emelyn Bradshaw MD;  Location: [facility];  Service:    • HYSTERECTOMY     • THYROID LOBECTOMY      approx  or    • THYROIDECTOMY, PARTIAL  2002    Sub-Total Thyroidectomy      General Information     Row Name 22 140          Physical Therapy Time and Intention    Document Type therapy note (daily note) (P)   -ND     Mode of Treatment physical therapy (P)   -ND     Row Name 22 1406          General Information    Patient Profile Reviewed yes (P)   -ND     Prior Level of Function independent: (P)   -ND     Existing Precautions/Restrictions fall;NPO (P)   -ND     Barriers to Rehab medically complex;physical barrier;cognitive status (P)   -ND     Row Name 22 140          Cognition    Orientation Status (Cognition) unable/difficult to assess (P)   -ND     Row Name 22 140          Safety Issues, Functional Mobility    Safety Issues Affecting Function (Mobility) ability to follow commands;friction/shear risk (P)   -ND     Impairments Affecting Function (Mobility) cognition (P)   -ND     Cognitive Impairments, Mobility Safety/Performance attention (P)   -ND     Comment,  Safety Issues/Impairments (Mobility) pt following 25% of commands. (P)   -ND           User Key  (r) = Recorded By, (t) = Taken By, (c) = Cosigned By    Initials Name Provider Type    Cora Mo, PT Student PT Student               Mobility     Row Name 12/14/22 1407          Bed Mobility    Bed Mobility supine-sit;sit-supine (P)   -ND     Supine-Sit [place] (Bed Mobility) maximum assist (25% patient effort);2 person assist;verbal cues;nonverbal cues (demo/gesture) (P)   -ND     Sit-Supine [place] (Bed Mobility) 2 person assist;maximum assist (25% patient effort);verbal cues;nonverbal cues (demo/gesture) (P)   -ND     Assistive Device (Bed Mobility) bed rails;head of bed elevated;draw sheet (P)   -ND     Row Name 12/14/22 1407          Bed-Chair Transfer    Bed-Chair [place] (Transfers) not tested (P)   -ND     Row Name 12/14/22 1407          Sit-Stand Transfer    Sit-Stand [place] (Transfers) maximum assist (25% patient effort);verbal cues;nonverbal cues (demo/gesture);2 person assist (P)   -ND     Comment, (Sit-Stand Transfer) sit to stand transfer performed twice. (P)   -ND     Row Name 12/14/22 1407          Gait/Stairs (Locomotion)    [place] Level (Gait) not tested (P)   -ND     [place] Level (Stairs) not tested (P)   -ND           User Key  (r) = Recorded By, (t) = Taken By, (c) = Cosigned By    Initials Name Provider Type    Cora Mo, PT Student PT Student               Obj/Interventions     Row Name 12/14/22 1408          Range of Motion Comprehensive    General Range of Motion lower extremity range of motion deficits identified (P)   -ND     Comment, General Range of Motion R LE AROM normal. L LE AROM unable to be assessed- PROM full range (P)   -ND     Row Name 12/14/22 1408          Strength Comprehensive (MMT)    General Manual Muscle Testing (MMT) Assessment lower extremity strength deficits identified (P)   -ND     Comment, General Manual Muscle  Testing (MMT) Assessment R LE 3/5 grossly. L LE unable to be assessed. (P)   -ND     Row Name 12/14/22 1408          Balance    Balance Assessment sitting static balance;sitting dynamic balance;sit to stand dynamic balance;standing static balance (P)   -ND     Static Sitting Balance contact guard;moderate assist;verbal cues;non-verbal cues (demo/gesture) (P)   mod to CGA with verbal and tactile cueing.  -ND     Dynamic Sitting Balance contact guard;moderate assist;verbal cues;non-verbal cues (demo/gesture) (P)   -ND     Position, Sitting Balance sitting edge of bed (P)   -ND     Sit to Stand Dynamic Balance maximum assist;2-person assist;verbal cues;non-verbal cues (demo/gesture) (P)   performed twice.  -ND     Static Standing Balance maximum assist;2-person assist;verbal cues;non-verbal cues (demo/gesture) (P)   -ND     Balance Interventions sitting;sit to stand;static;dynamic (P)   -ND     Row Name 12/14/22 1408          Sensory Assessment (Somatosensory)    Sensory Assessment (Somatosensory) not tested (P)   -ND           User Key  (r) = Recorded By, (t) = Taken By, (c) = Cosigned By    Initials Name Provider Type    ND Cora Jurado, PT Student PT Student               Goals/Plan    No documentation.                Clinical Impression     Row Name 12/14/22 1411          Pain    Pre/Posttreatment Pain Comment unable to be assessed due to pt aphasic. (P)   -ND     Pain Intervention(s) Ambulation/increased activity;Repositioned (P)   -ND     Row Name 12/14/22 1411          Plan of Care Review    Plan of Care Reviewed With patient (P)   -ND     Outcome Evaluation Pt agreeable to participate in therapy this date but limited in capacity to participate and is globally aphasic. Pt following 25% of commands this date. Pt performed bed mobility max-assist x2 and maintained sitting EOB for 16 minutes with mod progressing to CGA with cueing. Pt kept eyes closed for most of therapy session and would inconsistently open  eyes and visually track to the R when cued. Pt tends to hold head rotated to the L and is able to rotate head to the right but will not maintain this position for longer than a few seconds. Pt performed sit to stand transfer twice with max-assist and maintained standing position with max-assist x2 for a few seconds. Pt would be a great candidate for inpatient rehab. (P)   -ND     Row Name 12/14/22 1411          Therapy Assessment/Plan (PT)    Criteria for Skilled Interventions Met (PT) yes;meets criteria (P)   -ND     Therapy Frequency (PT) daily (P)   -ND     Row Name 12/14/22 1411          Vital Signs    O2 Delivery Pre Treatment room air (P)   -ND     O2 Delivery Intra Treatment room air (P)   -ND     O2 Delivery Post Treatment room air (P)   -ND     Pre Patient Position Supine (P)   -ND     Intra Patient Position Sitting (P)   -ND     Post Patient Position Supine (P)   -ND     Row Name 12/14/22 1411          Positioning and Restraints    Pre-Treatment Position in bed (P)   -ND     Post Treatment Position bed (P)   -ND     In Bed supine;with nsg;call light within reach;encouraged to call for assist;exit alarm on;side rails up x2 (P)   -ND           User Key  (r) = Recorded By, (t) = Taken By, (c) = Cosigned By    Initials Name Provider Type    Cora Mo, PT Student PT Student               Outcome Measures     Row Name 12/14/22 1417          How much help from another person do you currently need...    Turning from your back to your side while in flat bed without using bedrails? 2 (P)   -ND     Moving from lying on back to sitting on the side of a flat bed without bedrails? 2 (P)   -ND     Moving to and from a bed to a chair (including a wheelchair)? 2 (P)   -ND     Standing up from a chair using your arms (e.g., wheelchair, bedside chair)? 2 (P)   -ND     Climbing 3-5 steps with a railing? 1 (P)   -ND     To walk in hospital room? 1 (P)   -ND     AM-PAC 6 Clicks Score (PT) 10 (P)   -ND     Highest  level of mobility 4 --> Transferred to chair/commode (P)   -ND           User Key  (r) = Recorded By, (t) = Taken By, (c) = Cosigned By    Initials Name Provider Type    ND Cora Jurado, PT Student PT Student                             Physical Therapy Education     Title: PT OT SLP Therapies (In Progress)     Topic: Physical Therapy (In Progress)     Point: Mobility training (Not Started)     Learner Progress:  Not documented in this visit.          Point: Home exercise program (Not Started)     Learner Progress:  Not documented in this visit.          Point: Body mechanics (Not Started)     Learner Progress:  Not documented in this visit.          Point: Precautions (In Progress)     Learning Progress Summary           Patient Acceptance, E, NR by ND at 12/14/2022 1417    Acceptance, E, NR by MD at 12/13/2022 1327                               User Key     Initials Effective Dates Name Provider Type Discipline    MD 06/16/21 -  Jeri Parker, PT Physical Therapist PT    ND 10/24/22 -  Cora Jurado, PT Student PT Student PT              PT Recommendation and Plan     Plan of Care Reviewed With: (P) patient  Outcome Evaluation: (P) Pt agreeable to participate in therapy this date but limited in capacity to participate and is globally aphasic. Pt following 25% of commands this date. Pt performed bed mobility max-assist x2 and maintained sitting EOB for 16 minutes with mod progressing to CGA with cueing. Pt kept eyes closed for most of therapy session and would inconsistently open eyes and visually track to the R when cued. Pt tends to hold head rotated to the L and is able to rotate head to the right but will not maintain this position for longer than a few seconds. Pt performed sit to stand transfer twice with max-assist and maintained standing position with max-assist x2 for a few seconds. Pt would be a great candidate for inpatient rehab.     Time Calculation:    PT Charges     Row Name 12/14/22 4563              Time Calculation    Start Time 1303 (P)   -ND      Stop Time 1326 (P)   -ND      Time Calculation (min) 23 min (P)   -ND      PT Received On 12/14/22 (P)   -ND            User Key  (r) = Recorded By, (t) = Taken By, (c) = Cosigned By    Initials Name Provider Type    ND Cora Jurado, PT Student PT Student              Therapy Charges for Today     Code Description Service Date Service Provider Modifiers Qty    87405145010  PT THER SUPP EA 15 MIN 12/14/2022 Cora Jurado, PT Student GP 2    [id number]  PT THERAPEUTIC ACT EA 15 MIN 12/14/2022 Cora Jurado, PT Student GP 2          PT G-Codes  Outcome Measure Options: AM-PAC 6 Clicks Basic Mobility (PT)  AM-PAC 6 Clicks Score (PT): (P) 10  Modified [place] Scale: 5 - Severe disability.  Bedridden, incontinent, and requiring constant nursing care and attention.  PT Discharge Summary  Anticipated Discharge Disposition (PT): (P) inpatient rehabilitation facility    COLEEN Sheets  12/14/2022

## 2022-12-14 NOTE — THERAPY TREATMENT NOTE
Patient Name: Karolina Hoyt  : 1950    MRN: [id number]                              Today's Date: 2022       Admit Date: 2022    Visit Dx:     ICD-10-CM ICD-9-CM   1. Intraparenchymal hemorrhage of brain (HCC)  I61.9 431     Patient Active Problem List   Diagnosis   • Diverticulosis of large intestine   • Essential hypertension   • Anxiety   • C. 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-   • Tachycardia      Past Surgical History:   Procedure Laterality Date   • COLONOSCOPY N/A 2018    Procedure: COLONOSCOPY TO CECUM;  Surgeon: Emelyn Bradshaw MD;  Location: [facility] ENDOSCOPY;  Service:    • HYSTERECTOMY     • THYROID LOBECTOMY      approx  or    • THYROIDECTOMY, PARTIAL  2002    Sub-Total Thyroidectomy      General Information     Row Name 22 1527          OT Time and Intention    Document Type therapy note (daily note)  -     Mode of Treatment physical therapy;co-treatment;occupational therapy  -     Row Name 22 1527          General Information    Patient Profile Reviewed yes  -KA     Existing Precautions/Restrictions fall;NPO  -KA     Barriers to Rehab medically complex;cognitive status  -     Row Name 22 1527          Cognition    Orientation Status (Cognition) unable/difficult to assess  global aphasia  -     Row Name 22 1527          Safety Issues, Functional Mobility    Safety Issues Affecting Function (Mobility) ability to follow commands  -     Impairments Affecting Function (Mobility) cognition;balance;postural/trunk control;range of motion (ROM);endurance/activity tolerance;coordination;grasp;strength;visual/perceptual  -           User Key  (r) = Recorded By, (t) = Taken  By, (c) = Cosigned By    Initials Name Provider Type    Sharon Washington, OT Occupational Therapist                 Mobility/ADL's     Row Name 12/14/22 1528          Bed Mobility    Bed Mobility supine-sit;sit-supine  -     Supine-Sit [place] (Bed Mobility) maximum assist (25% patient effort);2 person assist;verbal cues;nonverbal cues (demo/gesture)  -     Sit-Supine [place] (Bed Mobility) 2 person assist;maximum assist (25% patient effort);verbal cues;nonverbal cues (demo/gesture)  -     Assistive Device (Bed Mobility) bed rails;head of bed elevated;draw sheet  -[facility] Name 12/14/22 1528          Sit-Stand Transfer    Sit-Stand [place] (Transfers) maximum assist (25% patient effort);verbal cues;nonverbal cues (demo/gesture);2 person assist  -     Comment, (Sit-Stand Transfer) x2 EOB. HHAx2  -[facility] Name 12/14/22 1528          Functional Mobility    Functional Mobility- Ind. Level not appropriate to assess  -[facility] Name 12/14/22 1528          Activities of Daily Living    BADL Assessment/Intervention grooming;upper body dressing;lower body dressing  -[facility] Name 12/14/22 1528          Grooming Assessment/Training    [place] Level (Grooming) maximum assist (25% patient effort);wash face, hands;set up;verbal cues;nonverbal cues (demo/gesture)  -     Position (Grooming) edge of bed sitting  -     Comment, (Grooming) hand over hand required with LUE to bring washcloth to face. Able to hold washcloth to face  -[facility] Name 12/14/22 1528          Self-Feeding Assessment/Training    [place] Level (Feeding) dependent (less than 25% patient effort)  -EDGAR     Comment, (Feeding) NPO cortrak  -[facility] Name 12/14/22 1528          Toileting Assessment/Training    [place] Level (Toileting) dependent (less than 25% patient effort)  -[facility] Name 12/14/22 1528          Upper Body Dressing Assessment/Training    [place] Level (Upper Body Dressing) upper body  dressing skills;don;doff;verbal cues;nonverbal cues (demo/gesture);maximum assist (25% patient effort);dependent (less than 25% patient effort)  gown  -     Position (Upper Body Dressing) edge of bed sitting  -[facility] Name 12/14/22 1528          Lower Body Dressing Assessment/Training    [place] Level (Lower Body Dressing) lower body dressing skills;doff;don;socks;dependent (less than 25% patient effort)  -     Position (Lower Body Dressing) supine  -           User Key  (r) = Recorded By, (t) = Taken By, (c) = Cosigned By    Initials Name Provider Type    Sharon Washington OT Occupational Therapist               Obj/Interventions     [facility] Name 12/14/22 1530          Motor Skills    Motor Skills neuro-muscular function  -     Neuromuscular Function --  Participated in weightbearing on RUE while seated EOB. Pt following commands <25% of the time. Worked on tracking to the right side and reaching with LUE while sitting EOB. Initially required max A with LUE that progressed to min A  -KA     Row Name 12/14/22 1530          Balance    Static Sitting Balance --  Ranged from mod- CGA with cues throughout  -     Dynamic Sitting Balance moderate assist  -     Position, Sitting Balance sitting edge of bed  -     Static Standing Balance maximum assist;2-person assist;verbal cues;non-verbal cues (demo/gesture)  -     Balance Interventions sitting;standing;occupation based/functional task  -           User Key  (r) = Recorded By, (t) = Taken By, (c) = Cosigned By    Initials Name Provider Type    Sharon Washington OT Occupational Therapist               Goals/Plan    No documentation.                Clinical Impression     Row Name 12/14/22 1534          Pain Assessment    Pre/Posttreatment Pain Comment Unable to assess  -KA     Row Name 12/14/22 1534          Plan of Care Review    Plan of Care Reviewed With patient  -     Outcome Evaluation Pt seen for OT/PT session this PM. Pt able to follow  <25% of commands this date that improved with visual cues. Pt globally aphasic throughout. Required max Ax2 to perform bed mobility. Sitting balance ranged from mod-CGA with cues. Participated in weightbearing on the RUE while working on tracking to the R side. Required max cues to keep eyes open while sitting EOB at times and visually track to the R. Pt able to visually track object held up by OT with increased cuing. Unable to hold this position for long. Performed gentle PROM of RUE. Pt able to sit EOB for longer period of time this session. Max A required for simple grooming task and hand over hand with LUE. Performed STS x2 at EOB with max Ax2.  -     Row Name 12/14/22 1534          Vital Signs    Pre Patient Position Supine  -KA     Intra Patient Position Sitting  -KA     Post Patient Position Supine  -     Row Name 12/14/22 1534          Positioning and Restraints    Pre-Treatment Position in bed  -KA     Post Treatment Position bed  -KA     In Bed supine;call light within reach;encouraged to call for assist;exit alarm on  -KA           User Key  (r) = Recorded By, (t) = Taken By, (c) = Cosigned By    Initials Name Provider Type    Sharon Washington, KERLINE Occupational Therapist               Outcome Measures     Row Name 12/14/22 1541          How much help from another is currently needed...    Putting on and taking off regular lower body clothing? 1  -KA     Bathing (including washing, rinsing, and drying) 1  -KA     Toileting (which includes using toilet bed pan or urinal) 1  -KA     Putting on and taking off regular upper body clothing 1  -KA     Taking care of personal grooming (such as brushing teeth) 2  -KA     Eating meals 1  -KA     AM-PAC 6 Clicks Score (OT) 7  -KA     Row Name 12/14/22 1417          How much help from another person do you currently need...    Turning from your back to your side while in flat bed without using bedrails? 2  -LH (r) ND (t) LH (c)     Moving from lying on back to  sitting on the side of a flat bed without bedrails? 2  -LH (r) ND (t) LH (c)     Moving to and from a bed to a chair (including a wheelchair)? 2  -LH (r) ND (t) LH (c)     Standing up from a chair using your arms (e.g., wheelchair, bedside chair)? 2  -LH (r) ND (t) LH (c)     Climbing 3-5 steps with a railing? 1  -LH (r) ND (t) LH (c)     To walk in hospital room? 1  -LH (r) ND (t) LH (c)     AM-PAC 6 Clicks Score (PT) 10  -LH (r) ND (t)     Highest level of mobility 4 --> Transferred to chair/commode  -LH (r) ND (t)     Row Name 12/14/22 1541          Modified Zwolle Scale    Modified Mary Scale 5 - Severe disability.  Bedridden, incontinent, and requiring constant nursing care and attention.  -EDGAR     Row Name 12/14/22 1541          Functional Assessment    Outcome Measure Options AM-PAC 6 Clicks Daily Activity (OT);Modified Zwolle  -KA           User Key  (r) = Recorded By, (t) = Taken By, (c) = Cosigned By    Initials Name Provider Type     Renu Lo, PT Physical Therapist    Sharon Washington, OT Occupational Therapist    Cora Mo, PT Student PT Student                Occupational Therapy Education     Title: PT OT SLP Therapies (In Progress)     Topic: Occupational Therapy (In Progress)     Point: ADL training (In Progress)     Description:   Instruct learner(s) on proper safety adaptation and remediation techniques during self care or transfers.   Instruct in proper use of assistive devices.              Learning Progress Summary           Patient Nonacceptance, E, NL by LE at 12/12/2022 1246                   Point: Precautions (In Progress)     Description:   Instruct learner(s) on prescribed precautions during self-care and functional transfers.              Learning Progress Summary           Patient Nonacceptance, E, NL by LE at 12/12/2022 1246                   Point: Body mechanics (In Progress)     Description:   Instruct learner(s) on proper positioning and spine alignment  during self-care, functional mobility activities and/or exercises.              Learning Progress Summary           Patient Nonacceptance, E, NL by SUKHDEV at 12/12/2022 1246                               User Key     Initials Effective Dates Name Provider Type Trish SANTIZO 06/16/21 -  Renu Aviles OTR Occupational Therapist OT              OT Recommendation and Plan     Plan of Care Review  Plan of Care Reviewed With: patient  Outcome Evaluation: Pt seen for OT/PT session this PM. Pt able to follow <25% of commands this date that improved with visual cues. Pt globally aphasic throughout. Required max Ax2 to perform bed mobility. Sitting balance ranged from mod-CGA with cues. Participated in weightbearing on the RUE while working on tracking to the R side. Required max cues to keep eyes open while sitting EOB at times and visually track to the R. Pt able to visually track object held up by OT with increased cuing. Unable to hold this position for long. Performed gentle PROM of RUE. Pt able to sit EOB for longer period of time this session. Max A required for simple grooming task and hand over hand with LUE. Performed STS x2 at EOB with max Ax2.     Time Calculation:    Time Calculation- OT     Row Name 12/14/22 1542             Time Calculation- OT    OT Start Time 1303  -KA      OT Stop Time 1327  -KA      OT Time Calculation (min) 24 min  -KA      Total Timed Code Minutes- OT 24 minute(s)  -KA      OT Received On 12/14/22  -KA      OT - Next Appointment 12/16/22  -KA         Timed Charges    49671 -  OT Neuromuscular Reeducation Minutes 12  -KA      98582 - OT Therapeutic Activity Minutes 12  -KA         Total Minutes    Timed Charges Total Minutes 24  -KA       Total Minutes 24  -KA            User Key  (r) = Recorded By, (t) = Taken By, (c) = Cosigned By    Initials Name Provider Type    Sharon Washington OT Occupational Therapist              Therapy Charges for Today     Code Description Service Date  Service Provider Modifiers Qty    [id number]  OT NEUROMUSC RE EDUCATION EA 15 MIN 12/14/2022 Sharon Meng OT GO 1    [id number]  OT THERAPEUTIC ACT EA 15 MIN 12/14/2022 Sharon Meng OT GO 1               Sharon Meng OT  12/14/2022

## 2022-12-14 NOTE — PROGRESS NOTES
[facility]  Continuing to follow progress with therapy for participation and ability to tolerate 3 hours of therapy. Also noted continued eval for possible need for future peg    Ramona Aldrich RN  Acute Rehab Admission Nurse

## 2022-12-15 NOTE — PLAN OF CARE
Goal Outcome Evaluation:      Pt restless this shift, pt calmed down when PRN pain medication given.

## 2022-12-15 NOTE — PROGRESS NOTES
[facility]    Max A x 2 to come to a stand yesterday with PT. Noted patient limited by global aphasia. Will continue to monitor for progress and tolerance of therapies for determination of most appropriate level rehab at discharge.    Currently rehab is full with no bed availability anticipated till next week.    Grace Ruiz RN  Rehab Admissions Coordinator  [phone]

## 2022-12-15 NOTE — PLAN OF CARE
No neuro changes. Pt. Slept most of the shift. NIH stroke scale of 17.       Problem: Skin Injury Risk Increased  Goal: Skin Health and Integrity  Outcome: Ongoing, Progressing  Intervention: Optimize Skin Protection  Recent Flowsheet Documentation  Taken 12/15/2022 0410 by Aubrey Rosario RN  Head of Bed (HOB) Positioning: HOB at 30-45 degrees  Taken 12/15/2022 0214 by Aubrey Rosario RN  Head of Bed (HOB) Positioning: HOB at 30-45 degrees  Taken 12/15/2022 0016 by Aubrey Rosario RN  Head of Bed (HOB) Positioning: HOB at 30-45 degrees  Taken 12/14/2022 2211 by Aubrey Rosario RN  Head of Bed (HOB) Positioning: HOB at 30-45 degrees  Taken 12/14/2022 1905 by Aubrey Rosario RN  Head of Bed (HOB) Positioning: HOB at 30-45 degrees     Problem: Fall Injury Risk  Goal: Absence of Fall and Fall-Related Injury  Outcome: Ongoing, Progressing  Intervention: Identify and Manage Contributors  Recent Flowsheet Documentation  Taken 12/14/2022 2104 by Aubrey Rosario RN  Medication Review/Management: medications reviewed  Intervention: Promote Injury-Free Environment  Recent Flowsheet Documentation  Taken 12/15/2022 0410 by Aubrey Rosario RN  Safety Promotion/Fall Prevention:   activity supervised   assistive device/personal items within reach   clutter free environment maintained   fall prevention program maintained   lighting adjusted   nonskid shoes/slippers when out of bed   safety round/check completed  Taken 12/15/2022 0214 by Aubrey Rosario RN  Safety Promotion/Fall Prevention:   activity supervised   clutter free environment maintained   assistive device/personal items within reach   fall prevention program maintained  Taken 12/15/2022 0016 by Aubrey Rosario RN  Safety Promotion/Fall Prevention:   activity supervised   assistive device/personal items within reach   clutter free environment maintained   fall prevention program maintained   lighting adjusted   nonskid shoes/slippers when out of bed   safety round/check  completed  Taken 12/14/2022 2211 by Aubrey Rosario RN  Safety Promotion/Fall Prevention:   activity supervised   assistive device/personal items within reach   clutter free environment maintained   fall prevention program maintained   lighting adjusted   nonskid shoes/slippers when out of bed  Taken 12/14/2022 2104 by Aubrey Rosario RN  Safety Promotion/Fall Prevention:   activity supervised   assistive device/personal items within reach   clutter free environment maintained   fall prevention program maintained   lighting adjusted   nonskid shoes/slippers when out of bed   safety round/check completed  Taken 12/14/2022 1905 by Aubrey Rosario RN  Safety Promotion/Fall Prevention:   activity supervised   assistive device/personal items within reach   clutter free environment maintained   fall prevention program maintained   lighting adjusted   nonskid shoes/slippers when out of bed   safety round/check completed     Problem: Adjustment to Illness (Stroke, Hemorrhagic)  Goal: Optimal Coping  Outcome: Ongoing, Progressing  Intervention: Support Psychosocial Response to Stroke  Recent Flowsheet Documentation  Taken 12/14/2022 2104 by Aubrey Rosario RN  Family/Support System Care: support provided     Problem: Bowel Elimination Impaired (Stroke, Hemorrhagic)  Goal: Effective Bowel Elimination  Outcome: Ongoing, Progressing     Problem: Cerebral Tissue Perfusion (Stroke, Hemorrhagic)  Goal: Optimal Cerebral Tissue Perfusion  Outcome: Ongoing, Progressing  Intervention: Protect and Optimize Cerebral Perfusion  Recent Flowsheet Documentation  Taken 12/14/2022 2104 by Aubrey Rosario RN  Sensory Stimulation Regulation: care clustered     Problem: Cognitive Impairment (Stroke, Hemorrhagic)  Goal: Optimal Cognitive Function  Outcome: Ongoing, Progressing  Intervention: Optimize Cognitive Function  Recent Flowsheet Documentation  Taken 12/14/2022 2104 by Aubrey Rosario RN  Sensory Stimulation Regulation: care clustered      Problem: Communication Impairment (Stroke, Hemorrhagic)  Goal: Effective Communication Skills  Outcome: Ongoing, Progressing  Intervention: Optimize Communication Skills  Recent Flowsheet Documentation  Taken 12/14/2022 2104 by Aubrey Rosario RN  Communication Enhancement Strategies: call light answered in person     Problem: Functional Ability Impaired (Stroke, Hemorrhagic)  Goal: Optimal Functional Ability  Outcome: Ongoing, Progressing  Intervention: Optimize Functional Ability  Recent Flowsheet Documentation  Taken 12/15/2022 0410 by Aubrey Rosario RN  Activity Management: activity adjusted per tolerance  Taken 12/15/2022 0214 by Aubrey Rosario RN  Activity Management: activity adjusted per tolerance  Taken 12/15/2022 0016 by Aubrey Rosario RN  Activity Management: activity encouraged  Taken 12/14/2022 2211 by Aubrey Rosario RN  Activity Management: activity adjusted per tolerance  Taken 12/14/2022 2104 by Aubrey Rosario RN  Activity Management: activity adjusted per tolerance     Problem: Pain (Stroke, Hemorrhagic)  Goal: Acceptable Pain Control  Outcome: Ongoing, Progressing     Problem: Respiratory Compromise (Stroke, Hemorrhagic)  Goal: Effective Oxygenation and Ventilation  Outcome: Ongoing, Progressing  Intervention: Optimize Oxygenation and Ventilation  Recent Flowsheet Documentation  Taken 12/15/2022 0410 by Aubrey Rosario RN  Head of Bed (HOB) Positioning: HOB at 30-45 degrees  Taken 12/15/2022 0214 by Aubrey Rosario RN  Head of Bed (HOB) Positioning: HOB at 30-45 degrees  Taken 12/15/2022 0016 by Aubrey Rosario RN  Head of Bed (HOB) Positioning: HOB at 30-45 degrees  Taken 12/14/2022 2211 by Aubrey Rosario RN  Head of Bed (HOB) Positioning: HOB at 30-45 degrees  Taken 12/14/2022 1905 by Aubrey Rosario RN  Head of Bed (HOB) Positioning: HOB at 30-45 degrees     Problem: Sensorimotor Impairment (Stroke, Hemorrhagic)  Goal: Improved Sensorimotor Function  Outcome: Ongoing,  Progressing  Intervention: Optimize Range of Motion, Motor Control and Function  Recent Flowsheet Documentation  Taken 12/15/2022 0410 by Aubrey Rosario RN  Positioning/Transfer Devices:   pillows   in use  Taken 12/15/2022 0214 by Aubrey Rosario RN  Positioning/Transfer Devices:   pillows   in use  Taken 12/15/2022 0016 by Aubrey Rosario RN  Positioning/Transfer Devices:   pillows   in use  Taken 12/14/2022 2211 by Aubrey Rosario RN  Positioning/Transfer Devices:   pillows   in use  Taken 12/14/2022 2104 by Aubrey Rosario RN  Positioning/Transfer Devices: pillows  Taken 12/14/2022 1905 by Aubrey Rosario RN  Positioning/Transfer Devices:   pillows   in use     Problem: Swallowing Impairment (Stroke, Hemorrhagic)  Goal: Optimal Eating and Swallowing Without Aspiration  Outcome: Ongoing, Progressing     Problem: Urinary Elimination Impaired (Stroke, Hemorrhagic)  Goal: Effective Urinary Elimination  Outcome: Ongoing, Progressing     Problem: Adult Inpatient Plan of Care  Goal: Plan of Care Review  Outcome: Ongoing, Progressing  Goal: Patient-Specific Goal (Individualized)  Outcome: Ongoing, Progressing  Goal: Absence of Hospital-Acquired Illness or Injury  Outcome: Ongoing, Progressing  Intervention: Identify and Manage Fall Risk  Recent Flowsheet Documentation  Taken 12/15/2022 0410 by Aubrey Rosario RN  Safety Promotion/Fall Prevention:   activity supervised   assistive device/personal items within reach   clutter free environment maintained   fall prevention program maintained   lighting adjusted   nonskid shoes/slippers when out of bed   safety round/check completed  Taken 12/15/2022 0214 by Aubrey Rosario RN  Safety Promotion/Fall Prevention:   activity supervised   clutter free environment maintained   assistive device/personal items within reach   fall prevention program maintained  Taken 12/15/2022 0016 by Aubrey Rosario RN  Safety Promotion/Fall Prevention:   activity supervised   assistive  device/personal items within reach   clutter free environment maintained   fall prevention program maintained   lighting adjusted   nonskid shoes/slippers when out of bed   safety round/check completed  Taken 12/14/2022 2211 by Aubrey Rosario RN  Safety Promotion/Fall Prevention:   activity supervised   assistive device/personal items within reach   clutter free environment maintained   fall prevention program maintained   lighting adjusted   nonskid shoes/slippers when out of bed  Taken 12/14/2022 2104 by Aubrey Rosario RN  Safety Promotion/Fall Prevention:   activity supervised   assistive device/personal items within reach   clutter free environment maintained   fall prevention program maintained   lighting adjusted   nonskid shoes/slippers when out of bed   safety round/check completed  Taken 12/14/2022 1905 by Aubrey Rosario RN  Safety Promotion/Fall Prevention:   activity supervised   assistive device/personal items within reach   clutter free environment maintained   fall prevention program maintained   lighting adjusted   nonskid shoes/slippers when out of bed   safety round/check completed  Intervention: Prevent Skin Injury  Recent Flowsheet Documentation  Taken 12/15/2022 0410 by Aubrey Rosario RN  Body Position: supine  Taken 12/15/2022 0214 by Aubrey Rosario RN  Body Position:   right   tilted  Taken 12/15/2022 0016 by Aubrey Rosario RN  Body Position: supine  Taken 12/14/2022 2211 by Aubrey Rosario RN  Body Position:   right   tilted  Taken 12/14/2022 2104 by Aubrey Rosario RN  Body Position: supine  Taken 12/14/2022 1905 by Aubrey Rosario RN  Body Position: turned  Intervention: Prevent and Manage VTE (Venous Thromboembolism) Risk  Recent Flowsheet Documentation  Taken 12/15/2022 0410 by Aubrey Rosario RN  Activity Management: activity adjusted per tolerance  Taken 12/15/2022 0214 by Aubrey Rosario RN  Activity Management: activity adjusted per tolerance  Taken 12/15/2022 0016 by Marlene  Aubrey RN  Activity Management: activity encouraged  Taken 12/14/2022 2211 by Aubrey Rosario RN  Activity Management: activity adjusted per tolerance  Taken 12/14/2022 2104 by Aubrey Rosario RN  Activity Management: activity adjusted per tolerance  Intervention: Prevent Infection  Recent Flowsheet Documentation  Taken 12/14/2022 2104 by Aubrey Rosario RN  Infection Prevention: cohorting utilized  Goal: Optimal Comfort and Wellbeing  Outcome: Ongoing, Progressing  Intervention: Provide Person-Centered Care  Recent Flowsheet Documentation  Taken 12/14/2022 2104 by Aubrey Rosario RN  Trust Relationship/Rapport:   care explained   thoughts/feelings acknowledged  Goal: Readiness for Transition of Care  Outcome: Ongoing, Progressing   Goal Outcome Evaluation:

## 2022-12-16 PROBLEM — J69.0 ASPIRATION PNEUMONIA (HCC): Status: ACTIVE | Noted: 2022-01-01

## 2022-12-16 PROBLEM — R13.10 DYSPHAGIA: Status: ACTIVE | Noted: 2022-01-01

## 2022-12-16 NOTE — PROGRESS NOTES
LOS: 8 days     Name: Karolina Hoyt  Age: 72 y.o.  Sex: female  :  1950  MRN: [id number]         Primary Care Physician: Josie Youssef MD    Subjective   Subjective  Patient remains nonverbal.  Minimal ability to follow commands.  She is awake.  Discussed with  at bedside.  No acute overnight events.    Objective   Vital Signs  Temp:  [97.3 °F (36.3 °C)-98.3 °F (36.8 °C)] 98.3 °F (36.8 °C)  Heart Rate:  [74-91] 81  Resp:  [16-20] 20  BP: (106-132)/(64-75) 123/75  Body mass index is 24.17 kg/m².    Objective:  General Appearance:  Comfortable, in no acute distress and ill-appearing.    Vital signs: (most recent): Blood pressure 123/75, pulse 81, temperature 98.3 °F (36.8 °C), temperature source Oral, resp. rate 20, height 167 cm (65.75\"), weight 67.4 kg (148 lb 9.4 oz), SpO2 96 %.    HEENT: (Cortrak in place)    Lungs:  Normal effort and normal respiratory rate.    Heart: Normal rate.  Regular rhythm.    Abdomen: Abdomen is soft.  Bowel sounds are normal.   There is no abdominal tenderness.     Extremities: There is no dependent edema or local swelling.    Neurological: (Awake but nonverbal and really does not follow commands consistently).    Skin:  Warm and dry.              Results Review:       I reviewed the patient's new clinical results.    Results from last 7 days   Lab Units 22  0403 22  0411 22  0501 12/10/22  0343 22  1200   WBC 10*3/mm3 7.78 10.40 9.15 7.77 9.87   HEMOGLOBIN g/dL 11.3* 11.5* 11.0* 11.4* 11.0*   PLATELETS 10*3/mm3 251 197 238 262 257     Results from last 7 days   Lab Units 22  0403 22  0607 22  0501 12/10/22  0343 22  1200   SODIUM mmol/L 139 137 137 140 138   POTASSIUM mmol/L 3.8 3.7 4.2 3.9 3.8   CHLORIDE mmol/L 104 103 106 107 106   CO2 mmol/L 29.0 28.2 23.7 23.1 27.0   BUN mg/dL 9 9 9 12 12   CREATININE mg/dL 0.55* 0.59 0.61 0.59 0.58   CALCIUM mg/dL 9.2 8.8 8.3* 8.9 8.9   GLUCOSE mg/dL 125* 160* 100* 89 100*                  Scheduled Meds:   busPIRone, 10 mg, Nasogastric, BID  desvenlafaxine, 50 mg, Oral, QAM  docusate sodium, 100 mg, Nasogastric, Daily  iopamidol, 100 mL, Intravenous, Once in imaging  losartan, 100 mg, Nasogastric, Daily  senna-docusate sodium, 2 tablet, Nasogastric, BID  sodium chloride, 10 mL, Intravenous, Q12H  sodium chloride, 10 mL, Intravenous, Q12H      PRN Meds:   •  acetaminophen **OR** acetaminophen  •  aluminum-magnesium hydroxide-simethicone  •  senna-docusate sodium **AND** polyethylene glycol **AND** bisacodyl **AND** bisacodyl  •  Calcium Gluconate-NaCl **AND** calcium gluconate IVPB **AND** Calcium  •  fentaNYL citrate (PF)  •  HYDROcodone-acetaminophen  •  labetalol  •  labetalol  •  magnesium sulfate **OR** magnesium sulfate **OR** magnesium sulfate  •  ondansetron  •  potassium chloride **OR** potassium chloride **OR** potassium chloride  •  potassium phosphate infusion greater than 15 mMoles **OR** potassium phosphate infusion greater than 15 mMoles **OR** potassium phosphate **OR** sodium phosphate IVPB **OR** sodium phosphate IVPB  •  [COMPLETED] Insert Peripheral IV **AND** sodium chloride  •  sodium chloride  •  sodium chloride  •  sodium chloride  •  sodium chloride  Continuous Infusions:       Assessment & Plan   Active Hospital Problems    Diagnosis  POA   • **Intraparenchymal hemorrhage of brain (HCC) [I61.9]  Yes   • Dysphagia [R13.10]  Unknown   • Aspiration pneumonia (HCC) [J69.0]  Unknown   • Essential hypertension [I10]  Yes      Resolved Hospital Problems   No resolved problems to display.       Assessment & Plan    -Remains on cortrak tube feedings.  Speech therapy indicating that PEG tube would be most appropriate.  Discussed with  at bedside who would like to avoid PEG tube if at all possible.   -She has finished a full today just that his body also Clive ivory course of Zosyn for aspiration pneumonia  -Now off Keppra  -Neurosurgery has signed off  -Therapy  services        Momo Garcia MD  [facility]  12/16/22  09:35 EST

## 2022-12-16 NOTE — THERAPY TREATMENT NOTE
Patient Name: Karolina Hoyt  : 1950    MRN: [id number]                              Today's Date: 2022       Admit Date: 2022    Visit Dx:     ICD-10-CM ICD-9-CM   1. Intraparenchymal hemorrhage of brain (HCC)  I61.9 431     Patient Active Problem List   Diagnosis   • Diverticulosis of large intestine   • Essential hypertension   • Anxiety   • C. 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 ENDOSCOPY;  Service:    • HYSTERECTOMY     • THYROID LOBECTOMY      approx  or    • THYROIDECTOMY, PARTIAL  2002    Sub-Total Thyroidectomy      General Information     Row Name 22 1549          OT Time and Intention    Document Type therapy note (daily note)  -RB     Mode of Treatment individual therapy;occupational therapy  -RB     Row Name 22 1549          General Information    Patient Profile Reviewed yes  -RB     Existing Precautions/Restrictions fall;NPO  -RB     Row Name 22 1549          Cognition    Orientation Status (Cognition) unable/difficult to assess;other (see comments)  ~25% command following  -RB           User Key  (r) = Recorded By, (t) = Taken By, (c) = Cosigned By    Initials Name Provider Type    RB Amna Mason OT Occupational Therapist                 Mobility/ADL's     Row Name 22 1551          Bed Mobility    Bed Mobility supine-sit;sit-supine  -RB     Supine-Sit [place] (Bed Mobility) maximum assist (25% patient effort);1 person assist;verbal  cues;nonverbal cues (demo/gesture)  -RB     Sit-Supine [place] (Bed Mobility) maximum assist (25% patient effort);2 person assist;verbal cues;nonverbal cues (demo/gesture)  -RB     Assistive Device (Bed Mobility) bed rails  -RB     Comment, (Bed Mobility) x20-25 min EOB sitting  -RB     Row Name 12/16/22 1551          Transfers    Transfers sit-stand transfer  -RB     Comment, (Transfers) 2x  -RB     Row Name 12/16/22 1551          Sit-Stand Transfer    Sit-Stand [place] (Transfers) maximum assist (25% patient effort);2 person assist;verbal cues;nonverbal cues (demo/gesture)  -RB     Comment, (Sit-Stand Transfer) bottom barily clearing the bed  -RB     Row Name 12/16/22 1551          Functional Mobility    Functional Mobility- Ind. Level not appropriate to assess  -RB     Row Name 12/16/22 1551          Activities of Daily Living    BADL Assessment/Intervention grooming  -RB     Row Name 12/16/22 1551          Grooming Assessment/Training    [place] Level (Grooming) grooming skills;maximum assist (25% patient effort)  -RB     Assistive Devices (Grooming) hand over hand  -RB     Position (Grooming) edge of bed sitting  -RB     Comment, (Grooming) use of LUE - longest consistent time participating was ~5 seconds  -RB           User Key  (r) = Recorded By, (t) = Taken By, (c) = Cosigned By    Initials Name Provider Type    RB Amna Mason OT Occupational Therapist               Obj/Interventions     Row Name 12/16/22 1552          Vision Assessment/Intervention    Vision Assessment Comment cervical neck stretches and cues to scan to the R visual field. Improvements seen with slight eye gaze past midline to the right  -RB     Row Name 12/16/22 1552          Shoulder (Therapeutic Exercise)    Shoulder (Therapeutic Exercise) PROM (passive range of motion)  -RB     Shoulder PROM (Therapeutic Exercise) right;flexion;extension;aBduction;aDduction;external rotation;internal rotation;sitting;10 second  hold;5 repetitions  -RB     Row Name 12/16/22 1552          Elbow/Forearm (Therapeutic Exercise)    Elbow/Forearm (Therapeutic Exercise) AAROM (active assistive range of motion)  movement felt in elbow extension with gravity eliminated  -RB     Elbow/Forearm AAROM (Therapeutic Exercise) right;extension;3 repetitions;sitting  -RB     Row Name 12/16/22 1552          Wrist (Therapeutic Exercise)    Wrist (Therapeutic Exercise) PROM (passive range of motion)  -RB     Wrist PROM (Therapeutic Exercise) right;flexion;extension;10 second hold  -RB     Row Name 12/16/22 1552          Hand (Therapeutic Exercise)    Hand (Therapeutic Exercise) PROM (passive range of motion)  -RB     Hand PROM (Therapeutic Exercise) right;finger flexion;finger extension;10 second hold  -RB     Row Name 12/16/22 1552          Motor Skills    Therapeutic Exercise shoulder;elbow/forearm;wrist;hand  -RB     Row Name 12/16/22 1552          Balance    Comment, Balance CGA->Mod A for sitting balance at the EOB. Worked on weight shifting to the R and weight bearing on RUE.  -RB           User Key  (r) = Recorded By, (t) = Taken By, (c) = Cosigned By    Initials Name Provider Type    RB Amna Mason, OT Occupational Therapist               Goals/Plan    No documentation.                Clinical Impression     Row Name 12/16/22 1554          Pain Assessment    Pretreatment Pain Rating 0/10 - no pain  -RB     Posttreatment Pain Rating 0/10 - no pain  -RB     Row Name 12/16/22 1554          Plan of Care Review    Plan of Care Reviewed With patient;spouse  -RB     Progress improving  -RB     Row Name 12/16/22 1554          Therapy Assessment/Plan (OT)    Rehab Potential (OT) good, to achieve stated therapy goals  -RB     Criteria for Skilled Therapeutic Interventions Met (OT) yes;skilled treatment is necessary  -RB     Therapy Frequency (OT) 5 times/wk  -RB     Row Name 12/16/22 1554          Therapy Plan Review/Discharge Plan (OT)    Anticipated  Discharge Disposition (OT) inpatient rehabilitation facility;skilled nursing facility  -RB     Row Name 12/16/22 1554          Vital Signs    O2 Delivery Pre Treatment room air  -RB     O2 Delivery Intra Treatment room air  -RB     O2 Delivery Post Treatment room air  -RB     Pre Patient Position Supine  -RB     Intra Patient Position Standing  -RB     Post Patient Position Supine  -RB     Row Name 12/16/22 1554          Positioning and Restraints    Pre-Treatment Position in bed  -RB     Post Treatment Position bed  -RB     In Bed notified nsg;supine;call light within reach;encouraged to call for assist;exit alarm on;legs elevated  -RB           User Key  (r) = Recorded By, (t) = Taken By, (c) = Cosigned By    Initials Name Provider Type    RB Amna Mason OT Occupational Therapist               Outcome Measures     Row Name 12/16/22 0805          How much help from another person do you currently need...    Turning from your back to your side while in flat bed without using bedrails? 2  -BT     Moving from lying on back to sitting on the side of a flat bed without bedrails? 2  -BT     Moving to and from a bed to a chair (including a wheelchair)? 2  -BT     Standing up from a chair using your arms (e.g., wheelchair, bedside chair)? 1  -BT     Climbing 3-5 steps with a railing? 1  -BT     To walk in hospital room? 1  -BT     AM-PAC 6 Clicks Score (PT) 9  -BT     Highest level of mobility 3 --> Sat at edge of bed  -BT           User Key  (r) = Recorded By, (t) = Taken By, (c) = Cosigned By    Initials Name Provider Type    BT Cristal Antoine, RN Registered Nurse                Occupational Therapy Education     Title: PT OT SLP Therapies (In Progress)     Topic: Occupational Therapy (In Progress)     Point: ADL training (In Progress)     Description:   Instruct learner(s) on proper safety adaptation and remediation techniques during self care or transfers.   Instruct in proper use of assistive devices.               Learning Progress Summary           Patient Nonacceptance, E, NL by SUKHDEV at 12/12/2022 1246                   Point: Precautions (In Progress)     Description:   Instruct learner(s) on prescribed precautions during self-care and functional transfers.              Learning Progress Summary           Patient Nonacceptance, E, NL by SUKHDEV at 12/12/2022 1246                   Point: Body mechanics (In Progress)     Description:   Instruct learner(s) on proper positioning and spine alignment during self-care, functional mobility activities and/or exercises.              Learning Progress Summary           Patient Nonacceptance, E, NL by SUKHDEV at 12/12/2022 1246                               User Key     Initials Effective Dates Name Provider Type Discipline    LE 06/16/21 -  Renu Aviles OTR Occupational Therapist OT              OT Recommendation and Plan  Therapy Frequency (OT): 5 times/wk  Plan of Care Review  Plan of Care Reviewed With: patient, spouse  Progress: improving     Time Calculation:    Time Calculation- OT     Row Name 12/16/22 1548             Time Calculation- OT    OT Start Time 1413  -RB      OT Stop Time 1454  -RB      OT Time Calculation (min) 41 min  -RB      Total Timed Code Minutes- OT 41 minute(s)  -RB      OT Received On 12/16/22  -RB      OT - Next Appointment 12/19/22  -RB         Timed Charges    37620 - OT Therapeutic Exercise Minutes 11  -RB      00950 - OT Therapeutic Activity Minutes 30  -RB      84437 - OT Self Care/Mgmt Minutes 10  -RB         Total Minutes    Timed Charges Total Minutes 51  -RB       Total Minutes 51  -RB            User Key  (r) = Recorded By, (t) = Taken By, (c) = Cosigned By    Initials Name Provider Type    RB Amna Mason OT Occupational Therapist              Therapy Charges for Today     Code Description Service Date Service Provider Modifiers Qty    33407652590 HC OT THERAPEUTIC ACT EA 15 MIN 12/16/2022 Amna Mason OT GO 1    69369949605 HC OT SELF  CARE/MGMT/TRAIN EA 15 MIN 12/16/2022 Amna Mason, OT GO 1    29479256080 HC OT THER PROC EA 15 MIN 12/16/2022 Amna Mason OT GO 1               Amna Mason OT  12/16/2022

## 2022-12-16 NOTE — THERAPY EVALUATION
Acute Care - Speech Language Pathology   Swallow Re-Evaluation [facility]     Patient Name: Karolina Hoyt  : 1950  MRN: [id number]  Today's Date: 2022               Admit Date: 2022    Visit Dx:     ICD-10-CM ICD-9-CM   1. Intraparenchymal hemorrhage of brain (HCC)  I61.9 431     Patient Active Problem List   Diagnosis   • Diverticulosis of large intestine   • Essential hypertension   • Anxiety   • C. 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 for Method of Medication Administration: meds via alternate route (22)     Monitor for Signs of Aspiration: yes (22)  Recommended Diagnostics: reassess via VFSS (MBS) (22)  Swallow Criteria for Skilled Therapeutic Interventions Met: demonstrates skilled criteria (22)  Anticipated Discharge Disposition (SLP): anticipate therapy at next level of care (22)  Rehab Potential/Prognosis, Swallowing: good, to achieve stated therapy goals (22)  Therapy Frequency (Swallow): PRN (22)  Predicted Duration Therapy Intervention (Days): until discharge (22)                                         Plan of Care Reviewed With: patient, daughter  Outcome Evaluation: Patient alert and upright in bed. Appears comfortable. Unable to follow oral motor commands, demonstrated and tactile cues did not aid in ability to perform. daughter reports recently completing oral care. Patient with facial grimmace with toothette in oral cavity. Patient demonstrated acceptance of ice chip, oral manipulation slow/prolonged. Multiple swallows palpated. No voice produced s/p swallow. Patient demonstrated acceptace of thin via spoon, audible mistiming, belching, and then patient initiated swallow. Again, no voice produced s/p trial. Patient demonstrated acceptable margarito thick via spoon and cup, 2-3 swallows per bolus. Discussed instrumental swallow eval and concern for patient inability to meet nutritional needs via PO alone with concerns for fagitue with therapies. Minimal interaction with SLP, no commands followed. Concern for ability to participate in therapy.      SWALLOW EVALUATION (last 72 hours)     SLP Adult Swallow Evaluation     Row Name 12/16/22 1100 12/14/22 1000                Rehab Evaluation    Document Type re-evaluation  -OC evaluation  -OC       Subjective Information no complaints  -OC no complaints  -OC       Patient Observations alert;cooperative;agree to therapy  -OC alert;cooperative;agree to therapy  -OC       Patient Effort good  -OC good  -OC       Symptoms Noted During/After Treatment none  -OC none  -OC          General Information    Patient Profile Reviewed yes  -OC yes  -OC       Pertinent History Of Current Problem -- Patient admitted with large L frontal intraparenchymal hemorrage. Edema and midline shift. Patient with global aphasia.  -OC       Current Method of Nutrition NPO;nasogastric feedings  -OC NPO;nasogastric feedings  -OC       Precautions/Limitations, Vision WFL;for purposes of eval  -OC WFL;for purposes of eval  -OC       Precautions/Limitations, Hearing WFL  -OC  WFL  -OC       Prior Level of Function-Communication WFL  -OC WFL  -OC       Prior Level of Function-Swallowing no diet consistency restrictions  -OC no diet consistency restrictions  -OC       Plans/Goals Discussed with patient and family;agreed upon  -OC patient and family;agreed upon  -OC       Barriers to Rehab medically complex  -OC medically complex  -OC       Patient's Goals for Discharge patient could not state  -OC patient could not state  -OC       Family Goals for Discharge patient able to return to PO diet;other (see comments)  SLP discussed concerns regarding inability to meet nutritional needs via PO alone.  -OC patient able to return to PO diet;other (see comments)  as able  -OC          Pain Scale: Numbers Pre/Post-Treatment    Pretreatment Pain Rating 0/10 - no pain  -OC 0/10 - no pain  -OC       Pre/Posttreatment Pain Comment unable to assess, appears comfortable in bed- upright  -OC daughter noting inconsistent facial grimmacing prior to and after eval  -OC          Oral Motor Structure and Function    Dentition Assessment natural, present and adequate  -OC natural, present and adequate  -OC       Secretion Management WNL/WFL  -OC WNL/WFL  -OC       Mucosal Quality moist, healthy  -OC moist, healthy  -OC       Volitional Swallow unable to elicit  -OC unable to elicit  -OC       Volitional Cough unable to elicit  -OC unable to elicit  -OC          Oral Musculature and Cranial Nerve Assessment    Oral Motor General Assessment unable to assess;other (see comments)  -OC unable to assess;other (see comments)  unable to follow commands  -OC       Vocal Impairment, Detail. Cranial Nerve X (Vagus) other (see comments)  nonverbal this date, unable to cough/throat clear  -OC other (see comments)  nonverbal  -OC          Clinical Swallow Eval    Clinical Swallow Evaluation Summary Patient alert and upright in bed. Appears comfortable. Unable to follow oral motor commands, demonstrated and tactile cues did  not aid in ability to perform. daughter reports recently completing oral care. Patient with facial grimmace with toothette in oral cavity. Patient demonstrated acceptance of ice chip, oral manipulation slow/prolonged. Multiple swallows palpated. No voice produced s/p swallow. Patient demonstrated acceptace of thin via spoon, audible mistiming, belching, and then patient initiated swallow. Again, no voice produced s/p trial. Patient demonstrated acceptable margarito thick via spoon and cup, 2-3 swallows per bolus. Discussed instrumental swallow eval and concern for patient inability to meet nutritional needs via PO alone with concerns for fagitue with therapies. Minimal interaction with SLP, no commands followed. Concern for ability to participate in therapy.  -OC Patient alert and upright in bed. Patient visually tracking SLP and daughter in room. Patient unable to follow oral motor commands, demonstration and tactile cues did not aid. Patient appears to be tolerating secretios. Oral cavity appears clean and moist. Patient demonstrated adequate acceptance of ice chips, prolonged manipulation and delayed swallow. No voicing s/p trials. Patient demonstrated poor acceptance of nectar thick and honey thick via spoon. Delayed swallow noted. Improved acceptance of nectar thick via cup, imrpoved timing of swallow. Patient unable to accept thin via cup, anterior loss. Multiple swallows with thin via cup. Patient remained nonverbal throughout eval. SLP suspects given plan for rehab, patient will benefit from PEG as source of nutrition secondary to concern for ability to meet nutritional needs with PO diet alone when patient is appropriate for diet. Discused with daughter. Instrumental eval as patient accepting increased PO trials.  -OC          SLP Evaluation Clinical Impression    SLP Swallowing Diagnosis oral dysphagia;suspected pharyngeal dysphagia  -OC oral dysphagia;suspected pharyngeal dysphagia  -OC       Functional Impact  risk of aspiration/pneumonia  -OC risk of aspiration/pneumonia  -OC       Rehab Potential/Prognosis, Swallowing good, to achieve stated therapy goals  -OC good, to achieve stated therapy goals  -OC       Swallow Criteria for Skilled Therapeutic Interventions Met demonstrates skilled criteria  -OC demonstrates skilled criteria  -OC          Recommendations    Therapy Frequency (Swallow) PRN  -OC PRN  -OC       Predicted Duration Therapy Intervention (Days) until discharge  -OC until discharge  -OC       SLP Diet Recommendation NPO  -OC NPO  -OC       Recommended Diagnostics reassess via VFSS (MBS)  -OC reassess via clinical swallow evaluation  -OC       Oral Care Recommendations Oral Care BID/PRN  -OC Oral Care BID/PRN  -OC       SLP Rec. for Method of Medication Administration meds via alternate route  -OC meds via alternate route  -OC       Monitor for Signs of Aspiration yes  -OC yes  -OC       Anticipated Discharge Disposition (SLP) anticipate therapy at next level of care  -OC anticipate therapy at next level of care  -OC          Swallow Goals (SLP)    Swallow LTGs Swallow Long Term Goal (free text)  -OC Swallow Long Term Goal (free text)  -OC          (LTG) Swallow    (LTG) Swallow Tolerate least restrictive diet/PO trials.  -OC Tolerate least restrictive diet/PO trials.  -OC       [place] (Swallow Long Term Goal) with 1:1 assist/ supervision  -OC with 1:1 assist/ supervision  -OC       Time Frame (Swallow Long Term Goal) by discharge  -OC by discharge  -OC             User Key  (r) = Recorded By, (t) = Taken By, (c) = Cosigned By    Initials Name Effective Dates    OC Dwaine, JUNIOR Sharif,CCC-SLP 06/16/21 -                 EDUCATION  The patient has been educated in the following areas:   Dysphagia (Swallowing Impairment).        SLP GOALS     Row Name 12/16/22 1100 12/14/22 1000          (LTG) Swallow    (LTG) Swallow Tolerate least restrictive diet/PO trials.  -OC Tolerate least restrictive diet/PO trials.   -OC     [place] (Swallow Long Term Goal) with 1:1 assist/ supervision  -OC with 1:1 assist/ supervision  -OC     Time Frame (Swallow Long Term Goal) by discharge  -OC by discharge  -OC           User Key  (r) = Recorded By, (t) = Taken By, (c) = Cosigned By    Initials Name Provider Type    OC Chante Isidro MA,CCC-SLP Speech and Language Pathologist                   Time Calculation:    Time Calculation- SLP     Row Name 12/16/22 1452             Time Calculation- SLP    SLP Start Time 1045  -OC      SLP Received On 12/16/22  -OC         Untimed Charges    SLP Treatment ST Treatment Swallow Minutes  - 14565  -OC      63673-BE Treatment Swallow Minutes 60  -OC         Total Minutes    Untimed Charges Total Minutes 60  -OC       Total Minutes 60  -OC            User Key  (r) = Recorded By, (t) = Taken By, (c) = Cosigned By    Initials Name Provider Type    Chante Pizarro MA,CCC-SLP Speech and Language Pathologist                Therapy Charges for Today     Code Description Service Date Service Provider Modifiers Qty    [id number]  ST TREATMENT SWALLOW 4 12/16/2022 Chante Isidro MA,CCC-SLP GN 1               Chante Isidro MA,ANGELA-SLP  12/16/2022

## 2022-12-16 NOTE — PLAN OF CARE
Problem: Skin Injury Risk Increased  Goal: Skin Health and Integrity  Outcome: Ongoing, Not Progressing  Intervention: Optimize Skin Protection  Recent Flowsheet Documentation  Taken 12/16/2022 0800 by Cristal Antoine, RN  Pressure Reduction Techniques:   frequent weight shift encouraged   weight shift assistance provided  Pressure Reduction Devices: alternating pressure pump (ADD)  Skin Protection: adhesive use limited   Goal Outcome Evaluation:  Plan of Care Reviewed With: patient, spouse        Progress: no change

## 2022-12-16 NOTE — PROGRESS NOTES
[facility]    Chart reviewed and ST session noted. Patient global aphasia impacting ability to participate in therapy. No PT or OT noted yet today. Patient must be able to tolerate and participate in 3 hr therapy a day to meet criteria for inpatient acute rehab. We will continue to monitor therapies for progress, tolerance and participation for determination of most appropriate rehab level. Currently rehab is full and no availability till next week.     Grace Ruiz RN  Rehab Admissions Coordinator  [phone]

## 2022-12-16 NOTE — PLAN OF CARE
No neuro changes. NIH score of 17. She was able to follow some of my commands. Slept most of th shift.      Problem: Skin Injury Risk Increased  Goal: Skin Health and Integrity  12/16/2022 0531 by Aubrey Rosario RN  Outcome: Ongoing, Progressing  12/16/2022 0531 by Aubrey Rosario RN  Outcome: Ongoing, Progressing  Intervention: Optimize Skin Protection  Recent Flowsheet Documentation  Taken 12/16/2022 0420 by Aubrey Rosario RN  Head of Bed (HOB) Positioning: HOB at 30-45 degrees  Taken 12/16/2022 0229 by Aubrey Rosario RN  Head of Bed (HOB) Positioning: HOB at 30-45 degrees  Taken 12/16/2022 0022 by Aubrey Rosario RN  Head of Bed (HOB) Positioning: HOB at 30-45 degrees  Taken 12/15/2022 2206 by Aubrey Rosario RN  Head of Bed (HOB) Positioning: HOB at 30-45 degrees  Taken 12/15/2022 2025 by Aubrey Rosario RN  Pressure Reduction Techniques: frequent weight shift encouraged  Head of Bed (HOB) Positioning: HOB at 30-45 degrees  Pressure Reduction Devices: alternating pressure pump (ADD)  Skin Protection: adhesive use limited  Taken 12/15/2022 1915 by Aubrey Rosario RN  Head of Bed (HOB) Positioning: HOB at 30-45 degrees     Problem: Fall Injury Risk  Goal: Absence of Fall and Fall-Related Injury  12/16/2022 0531 by Aubrey Rosario RN  Outcome: Ongoing, Progressing  12/16/2022 0531 by Aubrey Rosario RN  Outcome: Ongoing, Progressing  Intervention: Identify and Manage Contributors  Recent Flowsheet Documentation  Taken 12/15/2022 2025 by Aubrey Rosario RN  Medication Review/Management: medications reviewed  Intervention: Promote Injury-Free Environment  Recent Flowsheet Documentation  Taken 12/16/2022 0420 by Aubrey Rosario RN  Safety Promotion/Fall Prevention:   activity supervised   assistive device/personal items within reach   clutter free environment maintained   fall prevention program maintained   lighting adjusted   nonskid shoes/slippers when out of bed   safety round/check completed  Taken 12/16/2022  0229 by Aubrey Rosario RN  Safety Promotion/Fall Prevention:   activity supervised   assistive device/personal items within reach   clutter free environment maintained   fall prevention program maintained   lighting adjusted   nonskid shoes/slippers when out of bed   safety round/check completed  Taken 12/16/2022 0022 by Aubrey Rosario RN  Safety Promotion/Fall Prevention:   activity supervised   assistive device/personal items within reach   clutter free environment maintained   fall prevention program maintained   lighting adjusted   nonskid shoes/slippers when out of bed   safety round/check completed  Taken 12/15/2022 2206 by Aubrey Rosario RN  Safety Promotion/Fall Prevention:   activity supervised   assistive device/personal items within reach   clutter free environment maintained   fall prevention program maintained   lighting adjusted   nonskid shoes/slippers when out of bed   safety round/check completed  Taken 12/15/2022 2025 by Aubrey Rosario RN  Safety Promotion/Fall Prevention:   activity supervised   assistive device/personal items within reach   clutter free environment maintained   fall prevention program maintained   lighting adjusted   nonskid shoes/slippers when out of bed   safety round/check completed  Taken 12/15/2022 1915 by Aubrey Rosario RN  Safety Promotion/Fall Prevention:   activity supervised   assistive device/personal items within reach   clutter free environment maintained   fall prevention program maintained   lighting adjusted   nonskid shoes/slippers when out of bed   safety round/check completed     Problem: Adjustment to Illness (Stroke, Hemorrhagic)  Goal: Optimal Coping  12/16/2022 0531 by Aubrey Rosario RN  Outcome: Ongoing, Progressing  12/16/2022 0531 by Aubrey Rosario RN  Outcome: Ongoing, Progressing  Intervention: Support Psychosocial Response to Stroke  Recent Flowsheet Documentation  Taken 12/15/2022 2025 by Aubrey Rosario RN  Family/Support System Care: support  provided     Problem: Bowel Elimination Impaired (Stroke, Hemorrhagic)  Goal: Effective Bowel Elimination  12/16/2022 0531 by Aubrey Rosario RN  Outcome: Ongoing, Progressing  12/16/2022 0531 by Aubrey Rosario RN  Outcome: Ongoing, Progressing  Intervention: Promote Effective Bowel Elimination  Recent Flowsheet Documentation  Taken 12/15/2022 2025 by Aubrey Rosario RN  Bowel Program: maintenance program followed     Problem: Cerebral Tissue Perfusion (Stroke, Hemorrhagic)  Goal: Optimal Cerebral Tissue Perfusion  12/16/2022 0531 by Aubrey Rosario RN  Outcome: Ongoing, Progressing  12/16/2022 0531 by Aubrey Rosario RN  Outcome: Ongoing, Progressing  Intervention: Protect and Optimize Cerebral Perfusion  Recent Flowsheet Documentation  Taken 12/15/2022 2025 by Aubrey Rosario RN  Sensory Stimulation Regulation: care clustered  Cerebral Perfusion Promotion: blood pressure monitored     Problem: Cognitive Impairment (Stroke, Hemorrhagic)  Goal: Optimal Cognitive Function  12/16/2022 0531 by Aubrey Rosario RN  Outcome: Ongoing, Progressing  12/16/2022 0531 by Aubrey Rosario RN  Outcome: Ongoing, Progressing  Intervention: Optimize Cognitive Function  Recent Flowsheet Documentation  Taken 12/15/2022 2025 by Aubrey Rosario RN  Sensory Stimulation Regulation: care clustered  Reorientation Measures:   clock in view   reorientation provided     Problem: Communication Impairment (Stroke, Hemorrhagic)  Goal: Effective Communication Skills  12/16/2022 0531 by Aubrey Rosario RN  Outcome: Ongoing, Progressing  12/16/2022 0531 by Aubrey Rosario RN  Outcome: Ongoing, Progressing  Intervention: Optimize Communication Skills  Recent Flowsheet Documentation  Taken 12/15/2022 2025 by Aubrey Rosario RN  Communication Enhancement Strategies: family/caregiver assisted with communication     Problem: Functional Ability Impaired (Stroke, Hemorrhagic)  Goal: Optimal Functional Ability  12/16/2022 0531 by Aubrey Rosario  RN  Outcome: Ongoing, Progressing  12/16/2022 0531 by Aubrey Rosario RN  Outcome: Ongoing, Progressing  Intervention: Optimize Functional Ability  Recent Flowsheet Documentation  Taken 12/16/2022 0420 by Aubrey Rosario RN  Activity Management: activity adjusted per tolerance  Taken 12/16/2022 0229 by Aubrey Rosario RN  Activity Management: activity adjusted per tolerance  Taken 12/16/2022 0022 by Aubrey Rosario RN  Activity Management: activity adjusted per tolerance  Taken 12/15/2022 2206 by Aubrey Rosario RN  Activity Management: activity adjusted per tolerance  Taken 12/15/2022 2025 by Aubrey Rosario RN  Activity Management: activity adjusted per tolerance  Taken 12/15/2022 1915 by Aubrey Rosario RN  Activity Management: activity adjusted per tolerance     Problem: Pain (Stroke, Hemorrhagic)  Goal: Acceptable Pain Control  12/16/2022 0531 by Aubrey Rosario RN  Outcome: Ongoing, Progressing  12/16/2022 0531 by Aubrey Rosario RN  Outcome: Ongoing, Progressing  Intervention: Monitor and Manage Pain  Recent Flowsheet Documentation  Taken 12/15/2022 2025 by Aubrey Rosario RN  Pain Management Interventions:   pillow support provided   position adjusted     Problem: Respiratory Compromise (Stroke, Hemorrhagic)  Goal: Effective Oxygenation and Ventilation  12/16/2022 0531 by Aubrey Rosario RN  Outcome: Ongoing, Progressing  12/16/2022 0531 by Aubrey Rosario RN  Outcome: Ongoing, Progressing  Intervention: Optimize Oxygenation and Ventilation  Recent Flowsheet Documentation  Taken 12/16/2022 0420 by Aubrey Rosario RN  Head of Bed (HOB) Positioning: HOB at 30-45 degrees  Taken 12/16/2022 0229 by Aubrey Rosario RN  Head of Bed (HOB) Positioning: HOB at 30-45 degrees  Taken 12/16/2022 0022 by Aubrey Rosario RN  Head of Bed (HOB) Positioning: HOB at 30-45 degrees  Taken 12/15/2022 2206 by Aubrey Rosario RN  Head of Bed (HOB) Positioning: HOB at 30-45 degrees  Taken 12/15/2022 2025 by Aubrey Rosario RN  Head  of Bed (HOB) Positioning: HOB at 30-45 degrees  Taken 12/15/2022 1915 by Aubrey Rosario RN  Head of Bed (HOB) Positioning: HOB at 30-45 degrees     Problem: Sensorimotor Impairment (Stroke, Hemorrhagic)  Goal: Improved Sensorimotor Function  12/16/2022 0531 by Aubrey Rosario RN  Outcome: Ongoing, Progressing  12/16/2022 0531 by Aubrey Rosario RN  Outcome: Ongoing, Progressing  Intervention: Optimize Range of Motion, Motor Control and Function  Recent Flowsheet Documentation  Taken 12/16/2022 0420 by Aubrey Rosario RN  Positioning/Transfer Devices:   pillows   in use  Taken 12/16/2022 0229 by Aubrey Rosario RN  Positioning/Transfer Devices:   pillows   in use  Taken 12/16/2022 0022 by Aubrey Rosario RN  Positioning/Transfer Devices:   pillows   in use  Taken 12/15/2022 2206 by Aubrey Rosario RN  Positioning/Transfer Devices:   pillows   in use  Taken 12/15/2022 2025 by Aubrey Rosario RN  Positioning/Transfer Devices:   pillows   in use  Range of Motion: active ROM (range of motion) encouraged  Taken 12/15/2022 1915 by Aubrey Rosario RN  Positioning/Transfer Devices:   pillows   in use  Intervention: Optimize Sensory and Perceptual Ability  Recent Flowsheet Documentation  Taken 12/15/2022 2025 by Aubrey Rosario RN  Pressure Reduction Techniques: frequent weight shift encouraged  Pressure Reduction Devices: alternating pressure pump (ADD)     Problem: Swallowing Impairment (Stroke, Hemorrhagic)  Goal: Optimal Eating and Swallowing Without Aspiration  12/16/2022 0531 by Aubrey Rosario RN  Outcome: Ongoing, Progressing  12/16/2022 0531 by Aubrey Rosario RN  Outcome: Ongoing, Progressing  Intervention: Optimize Eating and Swallowing  Recent Flowsheet Documentation  Taken 12/15/2022 2025 by Aubrey Rosario RN  Aspiration Precautions: tube feeding placement verified     Problem: Urinary Elimination Impaired (Stroke, Hemorrhagic)  Goal: Effective Urinary Elimination  12/16/2022 0531 by Aubrey Rosario  RN  Outcome: Ongoing, Progressing  12/16/2022 0531 by Aubrey Rosario RN  Outcome: Ongoing, Progressing     Problem: Adult Inpatient Plan of Care  Goal: Plan of Care Review  12/16/2022 0531 by Aubrey Rosario RN  Outcome: Ongoing, Progressing  12/16/2022 0531 by Aubrey Rosario RN  Outcome: Ongoing, Progressing  Goal: Patient-Specific Goal (Individualized)  12/16/2022 0531 by Aubrey Rosario RN  Outcome: Ongoing, Progressing  12/16/2022 0531 by Aubrey Rosario RN  Outcome: Ongoing, Progressing  Goal: Absence of Hospital-Acquired Illness or Injury  12/16/2022 0531 by Aubrey Rosario RN  Outcome: Ongoing, Progressing  12/16/2022 0531 by Aubrey Rosario RN  Outcome: Ongoing, Progressing  Intervention: Identify and Manage Fall Risk  Recent Flowsheet Documentation  Taken 12/16/2022 0420 by Aubrey Rosario RN  Safety Promotion/Fall Prevention:   activity supervised   assistive device/personal items within reach   clutter free environment maintained   fall prevention program maintained   lighting adjusted   nonskid shoes/slippers when out of bed   safety round/check completed  Taken 12/16/2022 0229 by Aubrey Rosario RN  Safety Promotion/Fall Prevention:   activity supervised   assistive device/personal items within reach   clutter free environment maintained   fall prevention program maintained   lighting adjusted   nonskid shoes/slippers when out of bed   safety round/check completed  Taken 12/16/2022 0022 by Aubrey Rosario RN  Safety Promotion/Fall Prevention:   activity supervised   assistive device/personal items within reach   clutter free environment maintained   fall prevention program maintained   lighting adjusted   nonskid shoes/slippers when out of bed   safety round/check completed  Taken 12/15/2022 2206 by Aubrey Rosario RN  Safety Promotion/Fall Prevention:   activity supervised   assistive device/personal items within reach   clutter free environment maintained   fall prevention program maintained    lighting adjusted   nonskid shoes/slippers when out of bed   safety round/check completed  Taken 12/15/2022 2025 by Aubrey Rosario RN  Safety Promotion/Fall Prevention:   activity supervised   assistive device/personal items within reach   clutter free environment maintained   fall prevention program maintained   lighting adjusted   nonskid shoes/slippers when out of bed   safety round/check completed  Taken 12/15/2022 1915 by Aubrey Rosario RN  Safety Promotion/Fall Prevention:   activity supervised   assistive device/personal items within reach   clutter free environment maintained   fall prevention program maintained   lighting adjusted   nonskid shoes/slippers when out of bed   safety round/check completed  Intervention: Prevent Skin Injury  Recent Flowsheet Documentation  Taken 12/16/2022 0420 by Aubrey Rosario RN  Body Position:   turned   tilted   left  Taken 12/16/2022 0229 by Aubrey Rosario RN  Body Position:   turned   tilted   right  Taken 12/16/2022 0022 by Aubrey Rosario RN  Body Position:   turned   left   tilted  Taken 12/15/2022 2206 by Aubrey Rosario RN  Body Position: supine  Taken 12/15/2022 2025 by Aubrey Rosario RN  Body Position: weight shifting  Skin Protection: adhesive use limited  Taken 12/15/2022 1915 by Aubrey Rosario RN  Body Position: right  Intervention: Prevent and Manage VTE (Venous Thromboembolism) Risk  Recent Flowsheet Documentation  Taken 12/16/2022 0420 by Aubrey Rosario RN  Activity Management: activity adjusted per tolerance  Taken 12/16/2022 0229 by Aubrey Rosario RN  Activity Management: activity adjusted per tolerance  Taken 12/16/2022 0022 by Aubrey Rosario RN  Activity Management: activity adjusted per tolerance  Taken 12/15/2022 2206 by Aubrey Rosario RN  Activity Management: activity adjusted per tolerance  Taken 12/15/2022 2025 by Aubrey Rosario RN  Activity Management: activity adjusted per tolerance  VTE Prevention/Management: sequential compression  devices on  Range of Motion: active ROM (range of motion) encouraged  Taken 12/15/2022 1915 by Aubrey Rosario RN  Activity Management: activity adjusted per tolerance  Intervention: Prevent Infection  Recent Flowsheet Documentation  Taken 12/15/2022 2025 by Aubrey Rosario RN  Infection Prevention: environmental surveillance performed  Goal: Optimal Comfort and Wellbeing  12/16/2022 0531 by Aubrey Rosario RN  Outcome: Ongoing, Progressing  12/16/2022 0531 by Aubrey Rosario RN  Outcome: Ongoing, Progressing  Intervention: Monitor Pain and Promote Comfort  Recent Flowsheet Documentation  Taken 12/15/2022 2025 by Aubrey Rosario RN  Pain Management Interventions:   pillow support provided   position adjusted  Intervention: Provide Person-Centered Care  Recent Flowsheet Documentation  Taken 12/15/2022 2025 by Aubrey Rosario RN  Trust Relationship/Rapport:   care explained   thoughts/feelings acknowledged  Goal: Readiness for Transition of Care  12/16/2022 0531 by Aubrey Rosario RN  Outcome: Ongoing, Progressing  12/16/2022 0531 by Aubrey Rosario RN  Outcome: Ongoing, Progressing   Goal Outcome Evaluation:

## 2022-12-16 NOTE — PLAN OF CARE
Goal Outcome Evaluation:  Plan of Care Reviewed With: patient, daughter           Outcome Evaluation: Patient alert and upright in bed. Appears comfortable. Unable to follow oral motor commands, demonstration and tactile cues did not aid in ability to perform. Daughter reports recently completing oral care. Patient with facial grimace with toothette in oral cavity. Patient demonstrated acceptance of ice chip, oral manipulation slow/prolonged. Multiple swallows palpated. No voice produced s/p swallow. Patient demonstrated acceptance of thin via spoon, audible mistiming, belching, and then patient initiated swallow. Again, no voice produced s/p trial. Patient demonstrated acceptable margarito thick via spoon and cup, 2-3 swallows per bolus. Inconsistent facial grimace s/p PO trials. Discussed instrumental swallow eval and concern for patient inability to meet nutritional needs via PO alone with concerns for fatigue with therapies. Minimal interaction with SLP, no commands followed. Concern for ability to fully participate in therapy. SLP to follow for instrumental eval and speech/language eval as patient demonstrating intent to communicate.

## 2022-12-16 NOTE — PLAN OF CARE
The pt participated in OT therapy this PM. The pt made eye contact in L visual field with therapist on arrival. Pt following ~25% commands today with max cues. The pt was able to squeeze therapists hand with LUE on command 1/5 trials. Max hand over hand A initially to brush her hair with the LUE. Max A to assist the pt to the EOB. She tolerated x25 min EOB sitting with CGA-Mod A for sitting balance. Stretching of RUE completed - pt actively completed elbow extension with gravity eliminated present x3 reps. She participated in cervical neck stretching due to a pronounced L gaze preference/head turn. Max A x2 to bring her bottom off the bed two times. Max A x2 to return to supine. The pt was highly independent prior to admission and the hope is to transition to acute rehab the more the pt is able to participate. Will continue to follow.

## 2022-12-16 NOTE — PROGRESS NOTES
Continued Stay Note  [facility]     Patient Name: Karolina Hoyt  MRN: [id number]  Today's Date: 12/16/2022    Admit Date: 12/8/2022    Plan: Rehab referral pending   Discharge Plan     Row Name 12/16/22 1039       Plan    Plan Rehab referral pending    Patient/Family in Agreement with Plan yes    Plan Comments Rehab referral pending to [facility] acute rehab. Pt remains with anival connolly per [facility] to continue to follow clinical course to make appropriate rehab arrangements. Denise Dyer LCSW               Discharge Codes    No documentation.               Expected Discharge Date and Time     Expected Discharge Date Expected Discharge Time    Dec 19, 2022             Jessica Dyer LCSW

## 2022-12-17 PROBLEM — G93.5 COMPRESSION OF BRAIN DUE TO SPONTANEOUS CEREBRAL HEMORRHAGE (HCC): Status: ACTIVE | Noted: 2022-01-01

## 2022-12-17 PROBLEM — I61.9 COMPRESSION OF BRAIN DUE TO SPONTANEOUS CEREBRAL HEMORRHAGE: Status: ACTIVE | Noted: 2022-01-01

## 2022-12-17 PROBLEM — R47.01 APHASIA: Status: ACTIVE | Noted: 2022-01-01

## 2022-12-17 NOTE — PROGRESS NOTES
Name: Karolina Hoyt ADMIT: 2022   : 1950  PCP: Josie Youssef MD    MRN: [id number] LOS: 9 days   AGE/SEX: 72 y.o. female  ROOM: [facility]     Subjective   Subjective   Sleeping but does awaken to moderate stimulation.  Will open her eyes and track.  She will smile but not follow very many commands.  Core track remains in place.    Review of Systems     Objective   Objective   Vital Signs  Temp:  [97.7 °F (36.5 °C)-98.5 °F (36.9 °C)] 98.2 °F (36.8 °C)  Heart Rate:  [72-82] 75  Resp:  [16-20] 18  BP: (112-142)/(70-82) 125/81  SpO2:  [90 %-97 %] 97 %  on   ;   Device (Oxygen Therapy): room air  Body mass index is 24.17 kg/m².  Physical Exam  Vitals and nursing note reviewed.   Constitutional:       General: She is not in acute distress.     Appearance: Normal appearance.   HENT:      Nose:      Comments: Core track in place  Neck:      Vascular: No JVD.      Trachea: No tracheal deviation.   Cardiovascular:      Rate and Rhythm: Normal rate and regular rhythm.      Heart sounds: Normal heart sounds.   Pulmonary:      Effort: Pulmonary effort is normal. No respiratory distress.      Breath sounds: Normal breath sounds.   Abdominal:      General: Bowel sounds are normal.      Palpations: Abdomen is soft.      Tenderness: There is no abdominal tenderness.   Skin:     General: Skin is warm and dry.   Neurological:      Mental Status: She is lethargic.      Cranial Nerves: Facial asymmetry (Slight droop on the right) present.      Motor: Weakness (Diffuse but R>L) present.   Psychiatric:         Cognition and Memory: Cognition is impaired.       Results Review     I reviewed the patient's new clinical results.  Results from last 7 days   Lab Units 22  0403 22  0411 22  0501   WBC 10*3/mm3 7.78 10.40 9.15   HEMOGLOBIN g/dL 11.3* 11.5* 11.0*   PLATELETS 10*3/mm3 251 197 238     Results from last 7 days   Lab Units 22  0403 22  0607 22  0501   SODIUM mmol/L 139 137 137    POTASSIUM mmol/L 3.8 3.7 4.2   CHLORIDE mmol/L 104 103 106   CO2 mmol/L 29.0 28.2 23.7   BUN mg/dL 9 9 9   CREATININE mg/dL 0.55* 0.59 0.61   GLUCOSE mg/dL 125* 160* 100*   EGFR mL/min/1.73 97.5 95.9 95.1     Results from last 7 days   Lab Units 12/13/22  0403 12/12/22  0607 12/11/22  0501   ALBUMIN g/dL 3.10* 3.40* 2.90*   BILIRUBIN mg/dL 0.4 0.6 1.1   ALK PHOS U/L 56 66 68   AST (SGOT) U/L 14 10 17   ALT (SGPT) U/L 7 9 10     Results from last 7 days   Lab Units 12/13/22  0403 12/12/22  0607 12/11/22  0501   CALCIUM mg/dL 9.2 8.8 8.3*   ALBUMIN g/dL 3.10* 3.40* 2.90*     Results from last 7 days   Lab Units 12/11/22  0501   PROCALCITONIN ng/mL 0.03     Glucose   Date/Time Value Ref Range Status   12/16/2022 1745 108 70 - 130 mg/dL Final     Comment:     Meter: [id number] : [id number] Jeff LOYA   12/16/2022 0024 125 70 - 130 mg/dL Final     Comment:     Meter: [id number] : [id number] Rajiv VELASQUEZ   12/15/2022 0604 131 (H) 70 - 130 mg/dL Final     Comment:     Meter: [id number] : [id number] Rajiv VELASQUEZ       No radiology results for the last day  Scheduled Medications  busPIRone, 10 mg, Nasogastric, BID  desvenlafaxine, 50 mg, Oral, QAM  docusate sodium, 100 mg, Nasogastric, Daily  iopamidol, 100 mL, Intravenous, Once in imaging  losartan, 100 mg, Nasogastric, Daily  senna-docusate sodium, 2 tablet, Nasogastric, BID  sodium chloride, 10 mL, Intravenous, Q12H  sodium chloride, 10 mL, Intravenous, Q12H    Infusions   Diet  NPO Diet NPO Type: Strict NPO       Assessment/Plan     Active Hospital Problems    Diagnosis  POA   • **Intraparenchymal hemorrhage of brain (HCC) [I61.9]  Yes   • 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       72 y.o.  female admitted with Intraparenchymal hemorrhage of brain (HCC).    Discussed with  and daughter at bedside who would still like to avoid PEG tube if at all possible.  Discussed with RN who will reach out to speech therapy about whether patient can tolerate ice chips.  Mental status likely would not allow her to attempt diet anytime soon.  Speech therapy to continue following but will need to make a decision regarding PEG tube in the next few days.  Nasogastric feeding tube initially placed on 12/10/2022.    She completed a course of Zosyn for aspiration pneumonia per pulmonology.  Taken off [facility] per neurosurgery  Therapy services following would likely benefit from acute rehab    Discussed with clinical pharmacy regarding substituting Pristiq given that it cannot be crushed and given through a feeding tube.  Will change this to Effexor 37.5 mg twice daily dosing as this is the closest to her Pristiq 50 mg daily dosing.    Not seen by any provider on 12/15.  [facility] assumed care 12/16.      · SCDs for DVT prophylaxis.  · Full code.  · Discussed with patient, spouse, family and nursing staff.  · Anticipate discharge to acute rehab timing yet to be determined.      Ryan Horner MD  [facility]  12/17/22  10:21 EST

## 2022-12-17 NOTE — THERAPY PROGRESS REPORT/RE-CERT
Patient Name: Karolina Hoyt  : 1950    MRN: [id number]                              Today's Date: 2022       Admit Date: 2022    Visit Dx:     ICD-10-CM ICD-9-CM   1. Intraparenchymal hemorrhage of brain (HCC)  I61.9 431     Patient Active Problem List   Diagnosis   • Diverticulosis of large intestine   • Essential hypertension   • Anxiety   • C. 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     -never treated-   • Tachycardia      Past Surgical History:   Procedure Laterality Date   • COLONOSCOPY N/A 2018    Procedure: COLONOSCOPY TO CECUM;  Surgeon: Emelyn Bradshaw MD;  Location: [facility] ENDOSCOPY;  Service:    • HYSTERECTOMY     • THYROID LOBECTOMY      approx  or    • THYROIDECTOMY, PARTIAL  2002    Sub-Total Thyroidectomy      General Information     Row Name 22 1621          Physical Therapy Time and Intention    Document Type therapy note (daily note)  -CW     Mode of Treatment physical therapy  -CW     Row Name 22 162          General Information    Patient Profile Reviewed yes  -CW     Existing Precautions/Restrictions fall;NPO  -CW     Row Name 22          Cognition    Orientation Status (Cognition) unable/difficult to assess  -CW     Row Name 22 1621          Safety Issues, Functional Mobility    Impairments Affecting Function (Mobility) cognition  -CW           User Key  (r) = Recorded By, (t) = Taken By, (c) = Cosigned By    Initials Name Provider Type    CW Luis Angel Liao, PTA Physical Therapist Assistant               Mobility     Row Name 22 1621          Bed  Mobility    Bed Mobility supine-sit;sit-supine  -CW     Supine-Sit [place] (Bed Mobility) maximum assist (25% patient effort);1 person assist;verbal cues;nonverbal cues (demo/gesture)  -CW     Sit-Supine [place] (Bed Mobility) maximum assist (25% patient effort);2 person assist;verbal cues;nonverbal cues (demo/gesture)  -CW     Assistive Device (Bed Mobility) bed rails  -CW     Row Name 12/17/22 1621          Sit-Stand Transfer    Sit-Stand [place] (Transfers) maximum assist (25% patient effort);2 person assist;verbal cues;nonverbal cues (demo/gesture)  -CW           User Key  (r) = Recorded By, (t) = Taken By, (c) = Cosigned By    Initials Name Provider Type    Luis Angel Beltrán PTA Physical Therapist Assistant               Obj/Interventions     Row Name 12/17/22 1621          Range of Motion Comprehensive    General Range of Motion lower extremity range of motion deficits identified  -[facility] Name 12/17/22 1621          Strength Comprehensive (MMT)    General Manual Muscle Testing (MMT) Assessment lower extremity strength deficits identified  -           User Key  (r) = Recorded By, (t) = Taken By, (c) = Cosigned By    Initials Name Provider Type    Luis Angel Beltrán PTA Physical Therapist Assistant               Goals/Plan    No documentation.                Clinical Impression     Row Name 12/17/22 1622          Pain    Pain Intervention(s) Medication (See MAR);Repositioned;Ambulation/increased activity  -     Row Name 12/17/22 1622          Plan of Care Review    Plan of Care Reviewed With patient;family  -CW     Progress improving  -     Outcome Evaluation Pt alert today and able to sit EOB with A but look to the L and neglects L side.  -     Row Name 12/17/22 1622          Therapy Assessment/Plan (PT)    Rehab Potential (PT) good, to achieve stated therapy goals  -     Criteria for Skilled Interventions Met (PT) yes;meets criteria  -CW     Therapy Frequency (PT) daily   -CW     Row Name 12/17/22 1622          Positioning and Restraints    Pre-Treatment Position in bed  -CW     Post Treatment Position bed  -CW     In Bed notified nsg;fowlers;call light within reach;exit alarm on;encouraged to call for assist;SCD pump applied;with family/caregiver  -CW           User Key  (r) = Recorded By, (t) = Taken By, (c) = Cosigned By    Initials Name Provider Type    CW Luis Angel Liao PTA Physical Therapist Assistant               Outcome Measures     Row Name 12/17/22 1623          How much help from another person do you currently need...    Turning from your back to your side while in flat bed without using bedrails? 2  -CW     Moving from lying on back to sitting on the side of a flat bed without bedrails? 2  -CW     Moving to and from a bed to a chair (including a wheelchair)? 2  -CW     Standing up from a chair using your arms (e.g., wheelchair, bedside chair)? 1  -CW     Climbing 3-5 steps with a railing? 1  -CW     To walk in hospital room? 1  -CW     AM-PAC 6 Clicks Score (PT) 9  -CW     Highest level of mobility 3 --> Sat at edge of bed  -CW     Row Name 12/17/22 1623          Functional Assessment    Outcome Measure Options AM-PAC 6 Clicks Basic Mobility (PT)  -CW           User Key  (r) = Recorded By, (t) = Taken By, (c) = Cosigned By    Initials Name Provider Type    CW Luis Angel Liao PTA Physical Therapist Assistant                             Physical Therapy Education     Title: PT OT SLP Therapies (In Progress)     Topic: Physical Therapy (In Progress)     Point: Mobility training (Not Started)     Learner Progress:  Not documented in this visit.          Point: Home exercise program (Not Started)     Learner Progress:  Not documented in this visit.          Point: Body mechanics (Not Started)     Learner Progress:  Not documented in this visit.          Point: Precautions (In Progress)     Learning Progress Summary           Patient Acceptance, E, NR by ND at  12/14/2022 1417    Acceptance, E, NR by MD at 12/13/2022 1327                               User Key     Initials Effective Dates Name Provider Type Discipline    MD 06/16/21 -  Jeri Parker, PT Physical Therapist PT    ND 10/24/22 -  Cora Jurado, PT Student PT Student PT              PT Recommendation and Plan     Plan of Care Reviewed With: patient, family  Progress: improving  Outcome Evaluation: Pt alert today and able to sit EOB with A but look to the L and neglects L side.     Time Calculation:    PT Charges     Row Name 12/17/22 1623             Time Calculation    Start Time 1608  -CW      Stop Time 1623  -CW      Time Calculation (min) 15 min  -CW      PT Received On 12/17/22  -CW      PT - Next Appointment 12/18/22  -CW         Timed Charges    10589 - PT Therapeutic Activity Minutes 15  -CW         Total Minutes    Timed Charges Total Minutes 15  -CW       Total Minutes 15  -CW            User Key  (r) = Recorded By, (t) = Taken By, (c) = Cosigned By    Initials Name Provider Type    CW Luis Angel Liao PTA Physical Therapist Assistant              Therapy Charges for Today     Code Description Service Date Service Provider Modifiers Qty    [id number]  PT THERAPEUTIC ACT EA 15 MIN 12/17/2022 Luis Angel Liao PTA GP 1          PT G-Codes  Outcome Measure Options: AM-PAC 6 Clicks Basic Mobility (PT)  AM-PAC 6 Clicks Score (PT): 9  AM-PAC 6 Clicks Score (OT): 7  Modified Mary Scale: 5 - Severe disability.  Bedridden, incontinent, and requiring constant nursing care and attention.  PT Discharge Summary  Anticipated Discharge Disposition (PT): inpatient rehabilitation facility    Luis Angel Liao PTA  12/17/2022

## 2022-12-17 NOTE — PROGRESS NOTES
[facility] Acute Inpt Re[facility]     Continuing to follow progress.  Noted still trying to decide about peg placement.      No new physical therapy notes to review, but with Occupational therapy yesterday, not appropriate to assess functional mobility.      Will continue to monitor appropriateness for acute rehab.    Thanks,   Dora Horton RN  Rehab Admission Nurse

## 2022-12-18 NOTE — PLAN OF CARE
Problem: Skin Injury Risk Increased  Goal: Skin Health and Integrity  Outcome: Ongoing, Progressing  Intervention: Optimize Skin Protection  Recent Flowsheet Documentation  Taken 12/18/2022 0800 by Cristal Antoine, RN  Pressure Reduction Techniques:   frequent weight shift encouraged   weight shift assistance provided  Pressure Reduction Devices: alternating pressure pump (ADD)  Skin Protection: adhesive use limited   Goal Outcome Evaluation:  Plan of Care Reviewed With: patient        Progress: no change

## 2022-12-18 NOTE — PROGRESS NOTES
Name: Karolina Hoyt ADMIT: 2022   : 1950  PCP: Josie Youssef MD    MRN: [id number] LOS: 10 days   AGE/SEX: 72 y.o. female  ROOM: [facility]     Subjective   Subjective   Sleeping but awakens easily.  Makes eye contact and follows some simple commands.  No significant new issues per family who is at bedside.    Review of Systems     Objective   Objective   Vital Signs  Temp:  [97.4 °F (36.3 °C)-98.4 °F (36.9 °C)] 98.4 °F (36.9 °C)  Heart Rate:  [66-76] 67  Resp:  [16] 16  BP: (123-133)/(70-88) 132/88  SpO2:  [95 %-96 %] 96 %  on   ;   Device (Oxygen Therapy): room air  Body mass index is 24.17 kg/m².  Physical Exam  Vitals and nursing note reviewed.   Constitutional:       General: She is not in acute distress.     Appearance: Normal appearance.   HENT:      Nose:      Comments: Coretrack in place  Neck:      Vascular: No JVD.      Trachea: No tracheal deviation.   Cardiovascular:      Rate and Rhythm: Normal rate and regular rhythm.      Heart sounds: Normal heart sounds.   Pulmonary:      Effort: Pulmonary effort is normal. No respiratory distress.      Breath sounds: Normal breath sounds.   Abdominal:      General: Bowel sounds are normal.      Palpations: Abdomen is soft.      Tenderness: There is no abdominal tenderness.   Skin:     General: Skin is warm and dry.   Neurological:      Mental Status: She is lethargic.      Cranial Nerves: Facial asymmetry (Slight droop on the right) present.      Motor: Weakness (Diffuse but R>L) present.   Psychiatric:         Cognition and Memory: Cognition is impaired.       Results Review     I reviewed the patient's new clinical results.  Results from last 7 days   Lab Units 22  0836 22  0403 22  0411   WBC 10*3/mm3 7.79 7.78 10.40   HEMOGLOBIN g/dL 12.8 11.3* 11.5*   PLATELETS 10*3/mm3 411 251 197     Results from last 7 days   Lab Units 22  0836 22  0403 22  0607   SODIUM mmol/L 138 139 137   POTASSIUM mmol/L 4.0 3.8 3.7    CHLORIDE mmol/L 101 104 103   CO2 mmol/L 29.5* 29.0 28.2   BUN mg/dL 33* 9 9   CREATININE mg/dL 0.54* 0.55* 0.59   GLUCOSE mg/dL 141* 125* 160*   EGFR mL/min/1.73 98.0 97.5 95.9     Results from last 7 days   Lab Units 12/13/22  0403 12/12/22  0607   ALBUMIN g/dL 3.10* 3.40*   BILIRUBIN mg/dL 0.4 0.6   ALK PHOS U/L 56 66   AST (SGOT) U/L 14 10   ALT (SGPT) U/L 7 9     Results from last 7 days   Lab Units 12/18/22  0836 12/13/22  0403 12/12/22  0607   CALCIUM mg/dL 10.4 9.2 8.8   ALBUMIN g/dL  --  3.10* 3.40*         Glucose   Date/Time Value Ref Range Status   12/16/2022 1745 108 70 - 130 mg/dL Final     Comment:     Meter: [id number] : [id number] Jeff LOYA   12/16/2022 0024 125 70 - 130 mg/dL Final     Comment:     Meter: [id number] : [id number] Rajiv VELASQUEZ       No radiology results for the last day  Scheduled Medications  busPIRone, 10 mg, Nasogastric, BID  docusate sodium, 100 mg, Nasogastric, Daily  iopamidol, 100 mL, Intravenous, Once in imaging  losartan, 100 mg, Nasogastric, Daily  senna-docusate sodium, 2 tablet, Nasogastric, BID  sodium chloride, 10 mL, Intravenous, Q12H  venlafaxine, 37.5 mg, Oral, BID    Infusions   Diet  NPO Diet NPO Type: Strict NPO       Assessment/Plan     Active Hospital Problems    Diagnosis  POA   • **Intraparenchymal hemorrhage of brain (HCC) [I61.9]  Yes   • 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       72 y.o. female admitted with Intraparenchymal hemorrhage of brain (HCC).    Discussed again with family members at bedside.  Seems to be tolerating nasogastric tube feedings which will be continued for now.  Speech therapy to follow-up and reevaluate her swallow.  Hopefully she can be started on some sort of  modified diet.  Family obviously wanting to avoid PEG tube if possible.  Nasogastric feeding tube initially placed on 12/10/2022.    She completed a course of Zosyn for aspiration pneumonia per pulmonology.  Taken off Keppra per neurosurgery  Therapy services following would likely benefit from acute vs subacute rehab      · SCDs for DVT prophylaxis.  · Full code.  · Discussed with patient, spouse and family.  · Anticipate discharge to acute rehab timing yet to be determined.      Ryan Horner MD  [facility]  12/18/22  10:10 EST

## 2022-12-19 NOTE — CONSULTS
Visit with family after move to palliative care. Family is aware that chaplains are available for continued support.

## 2022-12-19 NOTE — PROGRESS NOTES
[facility] Acute Re[facility]  Changes noted- moved to palliative. Will sign off    Raomna coffey RN  Acute Rehab Admission Nurse

## 2022-12-19 NOTE — PROGRESS NOTES
Name: Karolina Hoyt ADMIT: 2022   : 1950  PCP: Josie Youssef MD    MRN: [id number] LOS: 11 days   AGE/SEX: 72 y.o. female  ROOM: [facility]     Subjective   Subjective   Patient currently obtunded and cannot give history.  Currently being cleaned up by the palliative care team.  Family members not currently at bedside.    Review of Systems     Objective   Objective   Vital Signs  Temp:  [97.6 °F (36.4 °C)-98.8 °F (37.1 °C)] 97.6 °F (36.4 °C)  Heart Rate:  [0-118] 115  Resp:  [16-30] 30  BP: (128-165)/(73-99) 130/92  SpO2:  [92 %-99 %] 97 %  on   ;   Device (Oxygen Therapy): room air  Body mass index is 23.88 kg/m².  Physical Exam  Vitals and nursing note reviewed.   Constitutional:       Appearance: She is ill-appearing.   HENT:      Nose:      Comments: Coretrack has been removed  Neurological:      Mental Status: She is lethargic.   Psychiatric:         Cognition and Memory: Cognition is impaired.       Results Review     I reviewed the patient's new clinical results.  Results from last 7 days   Lab Units 22  0836 22  0403   WBC 10*3/mm3 7.79 7.78   HEMOGLOBIN g/dL 12.8 11.3*   PLATELETS 10*3/mm3 411 251     Results from last 7 days   Lab Units 22  0836 22  0403   SODIUM mmol/L 138 139   POTASSIUM mmol/L 4.0 3.8   CHLORIDE mmol/L 101 104   CO2 mmol/L 29.5* 29.0   BUN mg/dL 33* 9   CREATININE mg/dL 0.54* 0.55*   GLUCOSE mg/dL 141* 125*   EGFR mL/min/1.73 98.0 97.5     Results from last 7 days   Lab Units 22  0403   ALBUMIN g/dL 3.10*   BILIRUBIN mg/dL 0.4   ALK PHOS U/L 56   AST (SGOT) U/L 14   ALT (SGPT) U/L 7     Results from last 7 days   Lab Units 22  0836 22  0403   CALCIUM mg/dL 10.4 9.2   ALBUMIN g/dL  --  3.10*         Glucose   Date/Time Value Ref Range Status   2022 1134 196 (H) 70 - 130 mg/dL Final     Comment:     Meter: [id number] : PANKAJ Azevedo RN   2022 1745 108 70 - 130 mg/dL Final     Comment:     Meter: [id number]  : [id number] Jeff LOYA       No radiology results for the last day  Scheduled Medications  iopamidol, 100 mL, Intravenous, Once in imaging    Infusions   Diet  NPO Diet NPO Type: Strict NPO       Assessment/Plan     Active Hospital Problems    Diagnosis  POA   • **Intraparenchymal hemorrhage of brain (HCC) [I61.9]  Yes   • 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       72 y.o. female admitted with Intraparenchymal hemorrhage of brain (HCC).    Events noted.  Patient's mental status had been slightly improved as of yesterday.  This morning patient was snoring and difficult to arouse per nursing.  She had an episode of vomiting.  Abdominal x-ray and chest x-ray were obtained and were unremarkable.  Her SBP has primarily been 120-130s only 1 time reaching as high as 150 late last evening.  Early this morning she had /99 treated with IV labetalol per neurosurgery's standing order.  Head CT was obtained showing worsening ICH.  She was seen urgently by the neurosurgical team who discussed options with family.  They have collectively decided not to pursue surgical intervention.  They have opted for comfort care.  She has been transferred to the palliative care unit.  Discussed with SIA.      Ryan Horner MD  [facility]  12/19/22  15:44 EST

## 2022-12-19 NOTE — CODE DOCUMENTATION
Primary RN states pt was at baseline this morning at 8am, and then noted changes and called rapid.  Daughter at bedside she states patient threw up shortly after she got to room then became unresponsive with snoring respirations.  RT at bedside trying to get ABG.  Pt is non-verbal no response

## 2022-12-19 NOTE — PROGRESS NOTES
Continued Stay Note  [facility]     Patient Name: Karolina Hoyt  MRN: [id number]  Today's Date: 12/19/2022    Admit Date: 12/8/2022    Plan: Rehab referral pending   Discharge Plan     Row Name 12/19/22 1049       Plan    Plan Rehab referral pending    Patient/Family in Agreement with Plan yes    Plan Comments Rehab referral pending to [facility] acute rehab. Pt continues TF via cortrak, await ST re-eval. CCP to continue to follow. Denise Dyer LCSW               Discharge Codes    No documentation.               Expected Discharge Date and Time     Expected Discharge Date Expected Discharge Time    Dec 19, 2022             Jessica Dyer LCSW

## 2022-12-19 NOTE — PLAN OF CARE
Goal Outcome Evaluation:  Plan of Care Reviewed With: family        Progress: declining  Outcome Evaluation: Pt transferred to  for palliative care. PPS 10%, unresponsive, breathing labored, snoring and tachypnic upon arrival. Feet cool and mottling noted. No pulmonary congestion noted at this time. Pt medicated with ativan 2mg just prior to transfer to , dilaudid 0.5mg given upon arrival. Pt cleaned, to placed. Pt placed in recovery position for ease of breathing. Breathing has slowed and Pt no longer snoring. Pt appears calm and comfortable. Support and education provided to family members at bedside.

## 2022-12-19 NOTE — SIGNIFICANT NOTE
12/19/22 1309   OTHER   Discipline physical therapy assistant   Rehab Time/Intention   Session Not Performed unable to treat, medical status change  (held 12/19 d/t rapid being called and pt transferred down to ICU; PT will re-eval tomorrow if appropriate)   Recommendation   PT - Next Appointment 12/20/22

## 2022-12-19 NOTE — CODE DOCUMENTATION
Snoring respirations eyes deviated up to R.  No response to Painful stimuli at this time call out to DR Horner by Primary RN order for head CT put in, I called team D pager spoke with Dr Francis.  Requests Neurosurgery be called for further orders, go ahead with CT head if ok place EEG order.CG

## 2022-12-19 NOTE — PROGRESS NOTES
Neurosurgery progress note    Chief complaint: Altered mental status and worsening of a large left sided intraparenchymal hemorrhage    Subjective: Sudden worsening of mental status.  Taken for emergent CT head shows now    Objective:  Vitals:    12/18/22 2356 12/19/22 0304 12/19/22 0500 12/19/22 0750   BP: 150/85 147/82  165/99   BP Location: Right arm Right arm  Right arm   Patient Position: Lying Lying  Lying   Pulse: 81 93  108   Resp: 18 18 18   Temp: 98.8 °F (37.1 °C) 97.8 °F (36.6 °C)  98.6 °F (37 °C)   TempSrc: Oral Oral  Oral   SpO2: 96% 99%  92%   Weight:   66.6 kg (146 lb 13.2 oz)    Height:           Physical exam  Not opening eyes to stimulation  Pupils are equal round and reactive to light  Eyes are deviated to the left  Not speaking or following commands  Trace withdrawal of left upper and lower extremity from painful stimulus  No motor response in right upper or lower extremity with painful stimulus      Assessment/plan  72-year-old female with a large left frontal hemorrhage and worsening mental status  -She was taken just now for an emergent repeat CT head.  I reviewed the CT images at her bedside in the ICU.  There has been a significant expansion of the left-sided frontal intraparenchymal hematoma.  There are additional areas of hemorrhage causing severe mass-effect and greater than 10 mm of midline shift at the foramen of Pollard.  She has a very poor neurologic exam.  She has had a very severe injury to the left side of her brain.  I discussed these findings with her family including her  and children.  We talked about strategies including craniectomy and possible evacuation of the hematoma.  Her injuries however are very severe decided to hold off from any surgical interventions or intubation.  They have expressed interest in proceeding with comfort care measures and a hospice consult.

## 2022-12-19 NOTE — PLAN OF CARE
Goal Outcome Evaluation:  Plan of Care Reviewed With: patient        Progress: declining  Outcome Evaluation: SLP following - VFSS scheduled for 12.19 1230. Pt with change in condition, RRT called this AM, orders in to transfer to ICU. Discussed with RN - will cancel order for today, follow up with pt at bedside for clinical stabilization, assess appropriatness for instrumental given change of status.

## 2022-12-19 NOTE — CODE DOCUMENTATION
Pt. In  Dr Pimentel at bedside neuro exam done.  Slight movement of L hand and L toes to extreme painful stimuli.  Dr Pimentel discussed CT result with family, encouraged not to intubate patient and provide comfort care measures.  Family agrees to comfort care, Dr Jarrett informed.  CG

## 2022-12-19 NOTE — PROGRESS NOTES
[facility] Acute Re[facility]  Continuing to follow for tolerance and ability to participate in 3 hours of therapy a day. Events noted. Will continue to follow    Ramona Aldrich RN  Acute Rehab Admission Nurse

## 2022-12-19 NOTE — CONSULTS
Patient transferred to the palliative care unit following goals of care and treatment preferences discussion with Dr. Pimentel and Dr. Jarrett.  Patient and/or family state goal of care to be comfort and treatment preferences to be geared toward symptom management and quality of life.  Met family in the room on palliative care unit, answered questions and advised of palliative care team availability. The palliative care team will continue to follow for any further needs.

## 2022-12-19 NOTE — PROGRESS NOTES
LOS: 11 days   Patient Care Team:  Josie Youssef MD as PCP - General (Internal Medicine)  Emelyn Bradshaw MD as Surgeon (General Surgery)  Leydi Guillaume MD as Consulting Physician (Psychiatry)  Yaa Ingram MD as Consulting Physician (Obstetrics and Gynecology)  Shruti Ibrahim APRN as Nurse Practitioner (Gastroenterology)    Chief Complaint:  F/up intracranial hemorrhage, uncontrolled hypertension and medical problems as below.    Subjective   Interval History  Reviewed the records and discussed with the rapid response team.  Patient became more altered and lethargic/obtunded today.  She was not responsive to painful stimuli.  She had 1 episode of vomiting earlier as well.    Stat ABG was performed and showed mild respiratory acidosis.  She does not seem to be able to protect her airways.  Stat CT of the brain showed significant worsening in her intracranial hemorrhage with now midline shift and herniation.    REVIEW OF SYSTEMS:   Cannot obtain due to unresponsiveness and aphasia.    Ventilator/Non-Invasive Ventilation Settings (From admission, onward)    None                Physical Exam:     Vital Signs  Temp:  [97.6 °F (36.4 °C)-98.8 °F (37.1 °C)] 98.6 °F (37 °C)  Heart Rate:  [] 108  Resp:  [16-18] 18  BP: (128-165)/(73-99) 165/99    Intake/Output Summary (Last 24 hours) at 12/19/2022 1337  Last data filed at 12/19/2022 0500  Gross per 24 hour   Intake 869 ml   Output --   Net 869 ml     Flowsheet Rows    Flowsheet Row First Filed Value   Admission Height 167.6 cm (66\") Documented at 12/09/2022 0528   Admission Weight 65.8 kg (145 lb) Documented at 12/09/2022 0528          PPE used per hospital policy    General Appearance:   Lethargic.  Does not follow commands.  Snoring noted.   ENMT:  External ears normal.  Moist lips.   Eyes:  Pupils equal and reactive to light. EOMI   Neck:   Trachea midline. No thyromegaly.   Lungs:    Clear to  auscultation,respirations regular, even and nonlabored    Heart:   Tachycardia but regular rhythm.  Systolic murmur over the right sternal border grade 3/6 with radiation   Skin:   No rash or ecchymosis   Abdomen:    Obese. Soft. No tenderness. No HSM.   Neuro/psych:  Obtunded.  Does not follow commands.  Not not respond to verbal stimuli.  Did not withdraw to pain stimulation.   Extremities:  No cyanosis, clubbing or edema.  Warm extremities and well-perfused          Results Review:        Results from last 7 days   Lab Units 12/18/22  0836 12/13/22  0403   SODIUM mmol/L 138 139   POTASSIUM mmol/L 4.0 3.8   CHLORIDE mmol/L 101 104   CO2 mmol/L 29.5* 29.0   BUN mg/dL 33* 9   CREATININE mg/dL 0.54* 0.55*   GLUCOSE mg/dL 141* 125*   CALCIUM mg/dL 10.4 9.2         Results from last 7 days   Lab Units 12/18/22  0836 12/13/22  0403   WBC 10*3/mm3 7.79 7.78   HEMOGLOBIN g/dL 12.8 11.3*   HEMATOCRIT % 40.4 33.7*   PLATELETS 10*3/mm3 411 251                           Results from last 7 days   Lab Units 12/19/22  1142   PH, ARTERIAL pH units 7.354   PCO2, ARTERIAL mm Hg 56.4*   PO2 ART mm Hg 67.3*   FLOW RATE lpm 4   MODALITY  Cannula   O2 SATURATION CALC % 91.6*         I reviewed the patient's new clinical results.        Medication Review:   busPIRone, 10 mg, Nasogastric, BID  docusate sodium, 100 mg, Nasogastric, Daily  iopamidol, 100 mL, Intravenous, Once in imaging  losartan, 100 mg, Nasogastric, Daily  senna-docusate sodium, 2 tablet, Nasogastric, BID  sodium chloride, 10 mL, Intravenous, Q12H  venlafaxine, 37.5 mg, Oral, BID             Diagnostic imaging:  I personally and independently reviewed the following images:  CT BRAIN 12/19/22: Significantly worsened intracranial hemorrhage with midline shift and herniation.      CXR 12/19/2022: Increased pulmonary interstitial markings no consolidation or effusion.    Assessment   1. Spontaneous left frontal intraparenchymal hemorrhage, worse  2. Brain edema and  herniation, worse  3. Acute hypercapnic and hypoxic respiratory failure, secondary to hypoventilation from brain herniation and probable apnea  4. Uncontrolled hypertension      5. Depression  6. Left arm skin lesion  7. Aspiration  8. Dysphagia  9. Mild anemia  10. Bilateral pulmonary infiltrates: In the apices (could represent scarring) and subtle infiltrates in the lower lobes bilaterally.  Could be related to aspiration.  11. History of basal cell carcinoma          Plan         Patient was transferred to the intensive care unit immediately.  She is obtunded and would require secured airway to prevent aspiration and due to hypercapnic respiratory failure.  However her prognosis is extremely poor and she would unlikely survive this.  We discussed that with the family as well as with neurosurgery and they elected to proceed with comfort measures.    Goals for comfort medications initiated and patient will be transferred to the palliative care unit.  .    Velma Jarrett MD  12/19/22  13:37 EST    CC time 35 min        This note was dictated utilizing Singly dictation

## 2022-12-19 NOTE — CODE DOCUMENTATION
5P called primary RN to discuss code status with family, no change in LOC. Also inform pt moving to .   Dr Jarrett informed and potential for intubation unless family states otherwise.  Transport called.  CG

## 2022-12-20 NOTE — PROGRESS NOTES
Discharge Planning Assessment  [facility]     Patient Name: Karolina Hoyt  MRN: [id number]  Today's Date: 12/20/2022    Admit Date: 12/8/2022    Plan: Rehab referral pending   Discharge Needs Assessment    No documentation.                Discharge Plan     Row Name 12/20/22 1359       Plan    Plan Comments The patient [place] [facility] [place]19/22. The patient is palliative. PPS 10%. CCP will continue to follow for any needs that may arise. LAY Watkins RN CCP              Continued Care and Services - Admitted Since 12/8/2022     Destination     Service Provider Request Status Selected Services Address Phone Fax Patient Preferred     [facility] Pending - No Request Sent N/A [address] [id number] [phone] -- --              Expected Discharge Date and Time     Expected Discharge Date Expected Discharge Time    Dec 22, 2022          Demographic Summary    No documentation.                Functional Status    No documentation.                Psychosocial    No documentation.                Abuse/Neglect    No documentation.                Legal    No documentation.                Substance Abuse    No documentation.                Patient Forms    No documentation.                   Alberta Watkins, RN

## 2022-12-20 NOTE — SIGNIFICANT NOTE
12/20/22 0818   OTHER   Discipline physical therapy assistant   Rehab Time/Intention   Session Not Performed other (see comments)  (PT signing off d/t palliative care w/ comfort measures)

## 2022-12-20 NOTE — PLAN OF CARE
Goal Outcome Evaluation:      Discussed with RN. RN reports patient palliative at this time. SLP to s/o, please re-consult as warranted.

## 2022-12-20 NOTE — DISCHARGE SUMMARY
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 female who presented to [facility] with some difficulty with speech and found to be secondary to a spontaneous left frontal intraparenchymal hemorrhage. Please see the admitting history and physical for further details.  She was seen by neurosurgery who recommended Keppra and keeping blood pressure under 150.  She was felt stable enough to avoid surgery and was transferred out of the ICU.  Initially managed with a nasogastric feeding tube.  She had been showing some signs of improvement and was awaiting evaluation for possibly starting her on oral diet.  Morning of  she was found to be more lethargic and had an episode of vomiting.  CT scan of the head demonstrated significant increase in size of intracerebral hemorrhage.  She was seen urgently by the neurosurgical team who discussed options with family.  They have collectively decided not to pursue surgical intervention.  They opted for comfort care.  She was transferred to the palliative care unit where she was kept comfortable until passing away this afternoon.    Ryan Horner MD  22  17:53 EST

## 2022-12-20 NOTE — PROGRESS NOTES
Name: Karolina Hoyt ADMIT: 2022   : 1950  PCP: Josie Youssef MD    MRN: [id number] LOS: 12 days   AGE/SEX: 72 y.o. female  ROOM: [facility]     Subjective   Subjective   Patient remains obtunded.  Has not open her eyes or communicate with family at all since .    Review of Systems     Objective   Objective   Vital Signs  Temp:  [98.8 °F (37.1 °C)] 98.8 °F (37.1 °C)  Heart Rate:  [] 107  Resp:  [20-22] 20  BP: (155)/(81) 155/81  SpO2:  [89 %-94 %] 89 %  on  Flow (L/min):  [2-4] 2;   Device (Oxygen Therapy): nasal cannula  Body mass index is 23.88 kg/m².  Physical Exam  Vitals and nursing note reviewed.   Constitutional:       Appearance: She is ill-appearing.   HENT:      Nose:      Comments: Coretrack has been removed  Pulmonary:      Effort: No respiratory distress.   Neurological:      Mental Status: She is unresponsive.       Results Review     I reviewed the patient's new clinical results.  Results from last 7 days   Lab Units 22  0836   WBC 10*3/mm3 7.79   HEMOGLOBIN g/dL 12.8   PLATELETS 10*3/mm3 411     Results from last 7 days   Lab Units 22  0836   SODIUM mmol/L 138   POTASSIUM mmol/L 4.0   CHLORIDE mmol/L 101   CO2 mmol/L 29.5*   BUN mg/dL 33*   CREATININE mg/dL 0.54*   GLUCOSE mg/dL 141*   EGFR mL/min/1.73 98.0         Results from last 7 days   Lab Units 22  0836   CALCIUM mg/dL 10.4         Glucose   Date/Time Value Ref Range Status   2022 1134 196 (H) 70 - 130 mg/dL Final     Comment:     Meter: [id number] : PANKAJ Azevedo RN       No radiology results for the last day  Scheduled Medications  iopamidol, 100 mL, Intravenous, Once in imaging    Infusions   Diet  NPO Diet NPO Type: Strict NPO       Assessment/Plan     Active Hospital Problems    Diagnosis  POA   • **Intraparenchymal hemorrhage of brain (HCC) [I61.9]  Yes   • 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       72 y.o. female admitted with Intraparenchymal hemorrhage of brain (HCC).    Patient has been transition to full comfort measures on the palliative care floor.  She is receiving Dilaudid and lorazepam.  Will consult hospice to see.  Discussed with family members at bedside.      Ryan Horner MD  [facility]  12/20/22  16:13 EST

## 2022-12-21 NOTE — PROGRESS NOTES
Case Management Discharge Note      Final Note: The patient  on 22 @ 17:40. LAY Watkins RN, CCP.         Selected Continued Care - Discharged on 2022 Admission date: 2022 - Discharge disposition:     Destination    No services have been selected for the patient.              Durable Medical Equipment    No services have been selected for the patient.              Dialysis/Infusion    No services have been selected for the patient.              Home Medical Care    No services have been selected for the patient.              Therapy    No services have been selected for the patient.              Community Resources    No services have been selected for the patient.              Community & DME    No services have been selected for the patient.                       Final Discharge Disposition Code: 20 -

## 2023-02-23 DIAGNOSIS — F41.1 GENERALIZED ANXIETY DISORDER: ICD-10-CM

## 2023-02-23 RX ORDER — LOSARTAN POTASSIUM 100 MG/1
100 TABLET ORAL DAILY
Qty: 90 TABLET | Refills: 1 | OUTPATIENT
Start: 2023-02-23
